# Patient Record
Sex: FEMALE | Race: WHITE | NOT HISPANIC OR LATINO | Employment: FULL TIME | URBAN - METROPOLITAN AREA
[De-identification: names, ages, dates, MRNs, and addresses within clinical notes are randomized per-mention and may not be internally consistent; named-entity substitution may affect disease eponyms.]

---

## 2017-11-21 ENCOUNTER — OFFICE VISIT (OUTPATIENT)
Dept: GASTROENTEROLOGY | Facility: CLINIC | Age: 32
End: 2017-11-21

## 2017-11-21 VITALS — BODY MASS INDEX: 20.92 KG/M2 | HEIGHT: 68 IN | WEIGHT: 138 LBS

## 2017-11-21 DIAGNOSIS — K76.6 PORTAL VENOUS HYPERTENSION (HCC): Primary | ICD-10-CM

## 2017-11-21 PROCEDURE — 99213 OFFICE O/P EST LOW 20 MIN: CPT | Performed by: INTERNAL MEDICINE

## 2017-11-21 RX ORDER — NADOLOL 20 MG/1
20 TABLET ORAL DAILY
Qty: 90 TABLET | Refills: 3 | Status: SHIPPED | OUTPATIENT
Start: 2017-11-21 | End: 2019-09-26 | Stop reason: SDUPTHER

## 2017-11-21 NOTE — PROGRESS NOTES
Chief Complaint   Patient presents with   • Follow-up     yearly/med refills       Chantelle Vásquez is a  32 y.o. female here for a follow up visit for Portal hypertension.    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  Patient had surgical correction now being followed for portal hypertension.  Patient on daily beta blocker but current medication reports having significant side effects.  Patient reports jitteriness and fatigue.  Patient denies nausea vomiting no melena or bright red blood per rectum denies hematemesis or change in diet.    Past Medical History:   Diagnosis Date   • Congenital biliary atresia    • Esophageal varices    • HTN (hypertension)    • Liver disease          Current Outpatient Prescriptions:   •  nadolol (CORGARD) 20 MG tablet, Take 1 tablet by mouth Daily., Disp: 90 tablet, Rfl: 3    Allergies   Allergen Reactions   • Aspirin      AVOIDS DUE TO LIVER HISTORY       Social History     Social History   • Marital status:      Spouse name: N/A   • Number of children: N/A   • Years of education: N/A     Occupational History   • Not on file.     Social History Main Topics   • Smoking status: Never Smoker   • Smokeless tobacco: Never Used   • Alcohol use No   • Drug use: No   • Sexual activity: Defer     Other Topics Concern   • Not on file     Social History Narrative       History reviewed. No pertinent family history.    Review of Systems   Constitutional: Positive for fatigue. Negative for activity change, appetite change, chills, diaphoresis, fever and unexpected weight change.   HENT: Negative.    Respiratory: Negative.    Cardiovascular: Negative.    Gastrointestinal: Negative.    Musculoskeletal: Negative.    Skin: Negative.    Hematological: Negative.        There were no vitals filed for this visit.    Physical Exam   Constitutional: She is oriented to person, place, and time. She appears well-developed and well-nourished.    HENT:   Head: Normocephalic and atraumatic.   Eyes: Pupils are equal, round, and reactive to light. No scleral icterus.   Abdominal: Soft. Bowel sounds are normal. She exhibits no distension and no mass. There is no tenderness. No hernia.   Neurological: She is alert and oriented to person, place, and time.   Skin: Skin is warm and dry. No rash noted.   Psychiatric: She has a normal mood and affect. Her behavior is normal.   Vitals reviewed.      No visits with results within 2 Month(s) from this visit.  Latest known visit with results is:    Admission on 03/18/2016, Discharged on 03/18/2016   Component Date Value Ref Range Status   • HCG, Urine, QL 03/18/2016 Negative  Negative In process       Chantelle was seen today for follow-up.    Diagnoses and all orders for this visit:    Portal venous hypertension  -     Case Request; Standing  -     Case Request    Other orders  -     Implement Anesthesia orders day of procedure.; Standing  -     Obtain informed consent; Standing  -     nadolol (CORGARD) 20 MG tablet; Take 1 tablet by mouth Daily.      Patient 32-year-old female with history of congenital malformation causing portal hypertension.  Patient status post surgical correction with Kasai procedure doing well without complaints.  Patient last endoscopy March 2016 was otherwise unremarkable.  Patient does report some issues with her current beta blocker causing jitteriness and fatigue.  At this point will repeat EGD to evaluate for varices and change her beta blocker to nadolol and follow for symptom response.

## 2018-01-19 ENCOUNTER — ANESTHESIA EVENT (OUTPATIENT)
Dept: GASTROENTEROLOGY | Facility: HOSPITAL | Age: 33
End: 2018-01-19

## 2018-01-19 ENCOUNTER — HOSPITAL ENCOUNTER (OUTPATIENT)
Facility: HOSPITAL | Age: 33
Setting detail: HOSPITAL OUTPATIENT SURGERY
Discharge: HOME | End: 2018-01-19
Attending: INTERNAL MEDICINE | Admitting: INTERNAL MEDICINE

## 2018-01-19 ENCOUNTER — ANESTHESIA (OUTPATIENT)
Dept: GASTROENTEROLOGY | Facility: HOSPITAL | Age: 33
End: 2018-01-19

## 2018-01-19 VITALS
RESPIRATION RATE: 16 BRPM | HEIGHT: 68 IN | WEIGHT: 142.13 LBS | OXYGEN SATURATION: 100 % | DIASTOLIC BLOOD PRESSURE: 63 MMHG | TEMPERATURE: 98.6 F | HEART RATE: 80 BPM | BODY MASS INDEX: 21.54 KG/M2 | SYSTOLIC BLOOD PRESSURE: 97 MMHG

## 2018-01-19 DIAGNOSIS — K76.6 PORTAL VENOUS HYPERTENSION (HCC): ICD-10-CM

## 2018-01-19 LAB
B-HCG UR QL: NEGATIVE
INTERNAL NEGATIVE CONTROL: NEGATIVE
INTERNAL POSITIVE CONTROL: POSITIVE
Lab: NORMAL

## 2018-01-19 PROCEDURE — S0260 H&P FOR SURGERY: HCPCS | Performed by: INTERNAL MEDICINE

## 2018-01-19 PROCEDURE — 88312 SPECIAL STAINS GROUP 1: CPT | Performed by: INTERNAL MEDICINE

## 2018-01-19 PROCEDURE — 43239 EGD BIOPSY SINGLE/MULTIPLE: CPT | Performed by: INTERNAL MEDICINE

## 2018-01-19 PROCEDURE — 25010000002 PROPOFOL 10 MG/ML EMULSION: Performed by: NURSE ANESTHETIST, CERTIFIED REGISTERED

## 2018-01-19 PROCEDURE — 88305 TISSUE EXAM BY PATHOLOGIST: CPT | Performed by: INTERNAL MEDICINE

## 2018-01-19 RX ORDER — SODIUM CHLORIDE, SODIUM LACTATE, POTASSIUM CHLORIDE, CALCIUM CHLORIDE 600; 310; 30; 20 MG/100ML; MG/100ML; MG/100ML; MG/100ML
1000 INJECTION, SOLUTION INTRAVENOUS CONTINUOUS PRN
Status: DISCONTINUED | OUTPATIENT
Start: 2018-01-19 | End: 2018-01-19 | Stop reason: HOSPADM

## 2018-01-19 RX ORDER — GLYCOPYRROLATE 0.2 MG/ML
INJECTION INTRAMUSCULAR; INTRAVENOUS AS NEEDED
Status: DISCONTINUED | OUTPATIENT
Start: 2018-01-19 | End: 2018-01-19 | Stop reason: SURG

## 2018-01-19 RX ORDER — PROPOFOL 10 MG/ML
VIAL (ML) INTRAVENOUS CONTINUOUS PRN
Status: DISCONTINUED | OUTPATIENT
Start: 2018-01-19 | End: 2018-01-19 | Stop reason: SURG

## 2018-01-19 RX ORDER — LIDOCAINE HYDROCHLORIDE 10 MG/ML
0.5 INJECTION, SOLUTION INFILTRATION; PERINEURAL ONCE AS NEEDED
Status: DISCONTINUED | OUTPATIENT
Start: 2018-01-19 | End: 2018-01-19 | Stop reason: HOSPADM

## 2018-01-19 RX ORDER — PROPOFOL 10 MG/ML
VIAL (ML) INTRAVENOUS AS NEEDED
Status: DISCONTINUED | OUTPATIENT
Start: 2018-01-19 | End: 2018-01-19 | Stop reason: SURG

## 2018-01-19 RX ORDER — SODIUM CHLORIDE 0.9 % (FLUSH) 0.9 %
3 SYRINGE (ML) INJECTION AS NEEDED
Status: DISCONTINUED | OUTPATIENT
Start: 2018-01-19 | End: 2018-01-19 | Stop reason: HOSPADM

## 2018-01-19 RX ADMIN — SODIUM CHLORIDE, POTASSIUM CHLORIDE, SODIUM LACTATE AND CALCIUM CHLORIDE 1000 ML: 600; 310; 30; 20 INJECTION, SOLUTION INTRAVENOUS at 07:37

## 2018-01-19 RX ADMIN — PROPOFOL 150 MCG/KG/MIN: 10 INJECTION, EMULSION INTRAVENOUS at 08:12

## 2018-01-19 RX ADMIN — PROPOFOL 60 MG: 10 INJECTION, EMULSION INTRAVENOUS at 08:11

## 2018-01-19 RX ADMIN — GLYCOPYRROLATE 0.2 MCG: 0.2 INJECTION INTRAMUSCULAR; INTRAVENOUS at 08:04

## 2018-01-19 NOTE — ANESTHESIA POSTPROCEDURE EVALUATION
"Patient: Chantelle Vásquez    Procedure Summary     Date Anesthesia Start Anesthesia Stop Room / Location    01/19/18 0802 0825  [facility]       Procedure Diagnosis Surgeon Provider    ESOPHAGOGASTRODUODENOSCOPY WITH BIOPSIES (N/A Esophagus) Portal venous hypertension; Gastritis; Hiatal hernia; Irregular Z line of esophagus  (Portal venous hypertension [K76.6]) MD Viridiana Rivera MD          Anesthesia Type: No value filed.  Last vitals  BP   102/60 (01/19/18 0825)   Temp   36.8 °C (98.2 °F) (01/19/18 0729)   Pulse   82 (01/19/18 0825)   Resp   16 (01/19/18 0825)     SpO2   99 % (01/19/18 0825)     Post Anesthesia Care and Evaluation    Patient location during evaluation: bedside  Patient participation: complete - patient participated  Level of consciousness: awake and alert  Pain management: adequate  Airway patency: patent  Anesthetic complications: No anesthetic complications  PONV Status: none  Cardiovascular status: acceptable  Respiratory status: acceptable  Hydration status: acceptable    Comments: /60 (BP Location: Left arm, Patient Position: Lying)  Pulse 82  Temp 36.8 °C (98.2 °F) (Oral)   Resp 16  Ht 172.7 cm (68\")  Wt 64.5 kg (142 lb 2 oz)  LMP 01/01/2018  SpO2 99%  BMI 21.61 kg/m2        "

## 2018-01-19 NOTE — H&P
"[facility]  Pre Procedure History & Physical    Chief Complaint:   Portal hypertension    Subjective     HPI:   Patient 32-year-old female with history congenital biliary atresia status post Kasai procedure. Due to chronic portal hypertension patient on monitoring regimen for varices.  Patient here for EGD    Past Medical History:   Past Medical History:   Diagnosis Date   • Congenital biliary atresia    • Esophageal varices    • HTN (hypertension)    • Liver disease    • Portal hypertension        Past Surgical History:  Past Surgical History:   Procedure Laterality Date   •  SECTION N/A    • ENDOSCOPY N/A 3/18/2016    Procedure: ESOPHAGOGASTRODUODENOSCOPY;  Surgeon: Cesar Castellanos MD;  Location: [facility] ENDOSCOPY;  Service:    • PORTOENTEROSTOMY KASAI PROCEDURE     • TUBAL ABDOMINAL LIGATION N/A        Family History:  Family History   Problem Relation Age of Onset   • Heart disease Mother        Social History:   reports that she has never smoked. She has never used smokeless tobacco. She reports that she does not drink alcohol or use illicit drugs.    Medications:   Prescriptions Prior to Admission   Medication Sig Dispense Refill Last Dose   • nadolol (CORGARD) 20 MG tablet Take 1 tablet by mouth Daily. 90 tablet 3 2018       Allergies:  Aspirin    ROS:    Pertinent items are noted in HPI     Objective     Blood pressure 118/80, pulse 80, temperature 98.2 °F (36.8 °C), temperature source Oral, resp. rate 16, height 172.7 cm (68\"), weight 64.5 kg (142 lb 2 oz), last menstrual period 2018, SpO2 99 %, currently breastfeeding.    Physical Exam   Constitutional: Pt is oriented to person, place, and time and well-developed, well-nourished, and in no distress.   Mouth/Throat: Oropharynx is clear and moist.   Neck: Normal range of motion.   Cardiovascular: Normal rate, regular rhythm and normal heart sounds.    Pulmonary/Chest: Effort normal and breath sounds normal. "   Abdominal: Soft. Nontender  Skin: Skin is warm and dry.   Psychiatric: Mood, memory, affect and judgment normal.     Assessment/Plan     Diagnosis:  Portal hypertension    Anticipated Surgical Procedure:  EGD    The risks, benefits, and alternatives of this procedure have been discussed with the patient or the responsible party- the patient understands and agrees to proceed.

## 2018-01-19 NOTE — DISCHARGE INSTRUCTIONS
For the next 24 hours patient needs to be with a responsible adult.    For 24 hours DO NOT drive, operate machinery, appliances, drink alcohol, make important decisions or sign legal documents.    Start with a light or bland diet and advance to regular diet as tolerated.    Follow recommendations on procedure report provided by your doctor.    Call Dr Castellanos for problems [phone]. Call for pathology results in 7-10 days.    Problems may include but not limited to: large amounts of bleeding, trouble breathing, repeated vomiting, severe unrelieved pain, fever or chills.

## 2018-01-19 NOTE — ANESTHESIA PREPROCEDURE EVALUATION
Anesthesia Evaluation     Patient summary reviewed   NPO Solid Status: > 8 hours  NPO Liquid Status: > 8 hours     Airway   Mallampati: II  TM distance: >3 FB  Dental      Pulmonary    Cardiovascular     Rhythm: regular  Rate: normal    (+) hypertension,       Neuro/Psych  GI/Hepatic/Renal/Endo    (+)  liver disease,     Musculoskeletal     Abdominal    Substance History      OB/GYN          Other                                                Anesthesia Plan    ASA 2       total IV anesthesia  Anesthetic plan and risks discussed with patient.    Plan discussed with CRNA.

## 2018-01-19 NOTE — PLAN OF CARE
Problem: Patient Care Overview (Adult)  Goal: Plan of Care Review  Outcome: Ongoing (interventions implemented as appropriate)   01/19/18 0712   Coping/Psychosocial Response Interventions   Plan Of Care Reviewed With patient   Patient Care Overview   Progress improving     Goal: Adult Individualization and Mutuality  Outcome: Ongoing (interventions implemented as appropriate)    Goal: Discharge Needs Assessment  Outcome: Ongoing (interventions implemented as appropriate)   01/19/18 0712   Discharge Needs Assessment   Concerns To Be Addressed no discharge needs identified   Discharge Disposition home or self-care   Living Environment   Transportation Available car;family or friend will provide       Problem: GI Endoscopy (Adult)  Intervention: Monitor/Manage Procedure Recovery   01/19/18 0712   Respiratory Interventions   Airway/Ventilation Management airway patency maintained   Coping/Psychosocial Interventions   Environmental Support calm environment promoted   Activity   Activity Type activity adjusted per tolerance   Cardiac Interventions   Warming Thermoregulation Maintenance warm blankets applied     Intervention: Prevent Erin-procedural Injury   01/19/18 0712   Positioning   Positioning side lying, left   Head Of Bed (HOB) Position HOB elevated       Goal: Signs and Symptoms of Listed Potential Problems Will be Absent or Manageable (GI Endoscopy)  Outcome: Ongoing (interventions implemented as appropriate)   01/19/18 0712   GI Endoscopy   Problems Assessed (GI Endoscopy) all   Problems Present (GI Endoscopy) none

## 2018-01-19 NOTE — BRIEF OP NOTE
ESOPHAGOGASTRODUODENOSCOPY  Progress Note    Chantelle Vásquez  1/19/2018    Pre-op Diagnosis:   Portal venous hypertension [K76.6]       Post-Op Diagnosis Codes:     * Portal venous hypertension [K76.6]     * Gastritis [K29.70]     * Hiatal hernia [K44.9]     * Irregular Z line of esophagus [K22.9]    Procedure/CPT® Codes:      Procedure(s):  ESOPHAGOGASTRODUODENOSCOPY WITH BIOPSIES    Surgeon(s):  Cesar Castellanos MD    Anesthesia: Monitor Anesthesia Care    Staff:   Endo Technician: Yashira Barrera  Endo Nurse: Meghan Gomez RN    Estimated Blood Loss: minimal    Urine Voided: * No values recorded between 1/19/2018  8:01 AM and 1/19/2018  8:18 AM *    Specimens:                  ID Type Source Tests Collected by Time Destination   A : ANTRAL BIOPSIES Tissue Gastric, Antrum TISSUE EXAM Cesar Castellanos MD 1/19/2018 0817          Drains:           Findings: Gastritis  Hiatal hernia  Irregular Z line  No varices seen    Complications: None      Cesar Castellanos MD     Date: 1/19/2018  Time: 8:19 AM

## 2018-01-22 ENCOUNTER — TELEPHONE (OUTPATIENT)
Dept: GASTROENTEROLOGY | Facility: CLINIC | Age: 33
End: 2018-01-22

## 2018-01-22 LAB
CYTO UR: NORMAL
LAB AP CASE REPORT: NORMAL
Lab: NORMAL
PATH REPORT.FINAL DX SPEC: NORMAL
PATH REPORT.GROSS SPEC: NORMAL

## 2018-01-22 NOTE — TELEPHONE ENCOUNTER
----- Message from Cesar Castellanos MD sent at 1/22/2018 12:36 PM EST -----  Just mild inflammation noted on biopsies continue her medication and follow-up as directed next year

## 2018-01-22 NOTE — TELEPHONE ENCOUNTER
Patient called, advised as per Dr. Castellanos's note. She verb understanding and is in agreeable to the plan.

## 2018-01-25 ENCOUNTER — DOCUMENTATION (OUTPATIENT)
Dept: GASTROENTEROLOGY | Facility: CLINIC | Age: 33
End: 2018-01-25

## 2019-09-26 ENCOUNTER — OFFICE VISIT (OUTPATIENT)
Dept: GASTROENTEROLOGY | Facility: CLINIC | Age: 34
End: 2019-09-26

## 2019-09-26 VITALS
BODY MASS INDEX: 23.01 KG/M2 | SYSTOLIC BLOOD PRESSURE: 108 MMHG | HEIGHT: 68 IN | WEIGHT: 151.8 LBS | DIASTOLIC BLOOD PRESSURE: 74 MMHG | TEMPERATURE: 98.2 F

## 2019-09-26 DIAGNOSIS — K76.6 PORTAL VENOUS HYPERTENSION (HCC): Primary | ICD-10-CM

## 2019-09-26 PROCEDURE — 99212 OFFICE O/P EST SF 10 MIN: CPT | Performed by: INTERNAL MEDICINE

## 2019-09-26 RX ORDER — NADOLOL 20 MG/1
20 TABLET ORAL DAILY
Qty: 90 TABLET | Refills: 3 | Status: SHIPPED | OUTPATIENT
Start: 2019-09-26 | End: 2020-11-24 | Stop reason: SDUPTHER

## 2019-09-26 NOTE — PROGRESS NOTES
Chief Complaint   Patient presents with   • Follow-up   • GI Problem     portal venous hypertension       Chantelle Vásquez is a  34 y.o. female here for a follow up visit for med refill.    HPI     Patient 34-year-old female with history of biliary atresia congenitally and esophageal varices here for follow-up.  Patient denies any GI complaints doing well.  Patient with no bleeding diathesis here for medication refill.    Past Medical History:   Diagnosis Date   • Congenital biliary atresia    • Esophageal varices (CMS/HCC)    • HTN (hypertension)    • Liver disease    • Portal hypertension (CMS/HCC)          Current Outpatient Medications:   •  nadolol (CORGARD) 20 MG tablet, Take 1 tablet by mouth Daily., Disp: 90 tablet, Rfl: 3    Allergies   Allergen Reactions   • Aspirin      AVOIDS DUE TO LIVER HISTORY       Social History     Socioeconomic History   • Marital status:      Spouse name: Not on file   • Number of children: Not on file   • Years of education: Not on file   • Highest education level: Not on file   Tobacco Use   • Smoking status: Never Smoker   • Smokeless tobacco: Never Used   Substance and Sexual Activity   • Alcohol use: No   • Drug use: No   • Sexual activity: Defer       Family History   Problem Relation Age of Onset   • Heart disease Mother        Review of Systems   Constitutional: Negative.    Respiratory: Negative.    Cardiovascular: Negative.    Gastrointestinal: Negative.    Musculoskeletal: Negative.    Skin: Negative.    Hematological: Negative.        Vitals:    09/26/19 1146   BP: 108/74   Temp: 98.2 °F (36.8 °C)       Physical Exam   Constitutional: She is oriented to person, place, and time. She appears well-developed and well-nourished.   HENT:   Head: Normocephalic and atraumatic.   Eyes: Pupils are equal, round, and reactive to light. No scleral icterus.   Abdominal: Soft. Bowel sounds are normal. She exhibits no distension and no mass. There is no tenderness. No hernia.    Neurological: She is alert and oriented to person, place, and time.   Skin: Skin is warm and dry.   Psychiatric: She has a normal mood and affect. Her behavior is normal.   Vitals reviewed.      No visits with results within 2 Month(s) from this visit.   Latest known visit with results is:   Admission on 01/19/2018, Discharged on 01/19/2018   Component Date Value Ref Range Status   • HCG, Urine, QL 01/19/2018 Negative  Negative Final   • Lot Number 01/19/2018 [id number]   Final   • Internal Positive Control 01/19/2018 Positive   Final   • Internal Negative Control 01/19/2018 Negative   Final   • Case Report 01/19/2018    Final                    Value:Surgical Pathology Report                         Case: [id number]                                  Authorizing Provider:  Cesar Castellanos MD     Collected:           01/19/2018 08:17 AM          Ordering Location:     [facility]  Received:            01/19/2018 11:08 AM                                 ENDO SUITES                                                                  Pathologist:           Pavan Lr MD                                                        Specimen:    Gastric, Antrum, ANTRAL BIOPSIES                                                          • Final Diagnosis 01/19/2018    Final                    Value:This result contains rich text formatting which cannot be displayed here.   • Gross Description 01/19/2018    Final                    Value:This result contains rich text formatting which cannot be displayed here.   • Microscopic Description 01/19/2018    Final                    Value:This result contains rich text formatting which cannot be displayed here.       Chantelle was seen today for follow-up and gi problem.    Diagnoses and all orders for this visit:    Portal venous hypertension (CMS/HCC)  -     Case Request; Standing  -     Implement Anesthesia orders day of procedure.; Standing  -     Obtain informed  consent; Standing  -     Case Request      Patient 34-year-old female with history of chronic portal hypertension due to congenital biliary atresia status post Kasai procedure doing well without complaints.  Patient here for med refill and to arrange follow-up EGD to monitor for varices.  We will proceed with refills and procedure and monitor clinically.

## 2020-11-24 ENCOUNTER — TELEPHONE (OUTPATIENT)
Dept: GASTROENTEROLOGY | Facility: CLINIC | Age: 35
End: 2020-11-24

## 2020-11-24 ENCOUNTER — OFFICE VISIT (OUTPATIENT)
Dept: GASTROENTEROLOGY | Facility: CLINIC | Age: 35
End: 2020-11-24

## 2020-11-24 VITALS — TEMPERATURE: 98.6 F | WEIGHT: 147.8 LBS | BODY MASS INDEX: 22.4 KG/M2 | HEIGHT: 68 IN

## 2020-11-24 DIAGNOSIS — K76.6 PORTAL VENOUS HYPERTENSION (HCC): Primary | ICD-10-CM

## 2020-11-24 PROCEDURE — 99212 OFFICE O/P EST SF 10 MIN: CPT | Performed by: INTERNAL MEDICINE

## 2020-11-24 RX ORDER — MULTIPLE VITAMINS W/ MINERALS TAB 9MG-400MCG
1 TAB ORAL DAILY
COMMUNITY

## 2020-11-24 RX ORDER — NADOLOL 20 MG/1
20 TABLET ORAL DAILY
Qty: 90 TABLET | Refills: 3 | Status: SHIPPED | OUTPATIENT
Start: 2020-11-24 | End: 2020-12-21 | Stop reason: HOSPADM

## 2020-11-24 NOTE — PROGRESS NOTES
Chief Complaint   Patient presents with   • Follow-up       Chantelle Vásquez is a  35 y.o. female here for a follow up visit for portal venous hypertension.    HPI     Patient 35-year-old female with history of congenital biliary atresia and portal hypertension here for follow-up.  Patient doing well with no complaints.  Last EGD January 2018 with no evidence of varices seen.  Patient due last year but has not followed up till now.  Patient denies hematemesis or melena no swelling or edema no ascites or distention.  Patient here for follow-up.    Past Medical History:   Diagnosis Date   • Congenital biliary atresia    • Esophageal varices (CMS/HCC)    • HTN (hypertension)    • Liver disease    • Portal hypertension (CMS/HCC)          Current Outpatient Medications:   •  multivitamin with minerals (MULTIVITAMIN WOMEN PO), Take 1 tablet by mouth Daily., Disp: , Rfl:   •  nadolol (CORGARD) 20 MG tablet, Take 1 tablet by mouth Daily., Disp: 90 tablet, Rfl: 3    Allergies   Allergen Reactions   • Aspirin      AVOIDS DUE TO LIVER HISTORY       Social History     Socioeconomic History   • Marital status:      Spouse name: Not on file   • Number of children: Not on file   • Years of education: Not on file   • Highest education level: Not on file   Tobacco Use   • Smoking status: Never Smoker   • Smokeless tobacco: Never Used   Substance and Sexual Activity   • Alcohol use: No   • Drug use: No   • Sexual activity: Defer       Family History   Problem Relation Age of Onset   • Heart disease Mother        Review of Systems   Constitutional: Negative.    Respiratory: Negative.    Cardiovascular: Negative.    Gastrointestinal: Negative.    Musculoskeletal: Negative.    Skin: Negative.    Hematological: Negative.        Vitals:    11/24/20 1345   Temp: 98.6 °F (37 °C)       Physical Exam  Vitals signs reviewed.   Constitutional:       Appearance: She is well-developed.   HENT:      Head: Normocephalic and atraumatic.    Eyes:      General: No scleral icterus.     Pupils: Pupils are equal, round, and reactive to light.   Cardiovascular:      Rate and Rhythm: Normal rate and regular rhythm.      Heart sounds: Normal heart sounds.   Pulmonary:      Effort: Pulmonary effort is normal.      Breath sounds: Normal breath sounds. No wheezing or rales.   Abdominal:      General: Bowel sounds are normal. There is no distension.      Palpations: Abdomen is soft. There is no mass.      Tenderness: There is no abdominal tenderness.      Hernia: No hernia is present.   Skin:     General: Skin is warm and dry.      Coloration: Skin is not jaundiced.   Neurological:      General: No focal deficit present.      Mental Status: She is alert and oriented to person, place, and time.   Psychiatric:         Behavior: Behavior normal.         Thought Content: Thought content normal.         No visits with results within 2 Month(s) from this visit.   Latest known visit with results is:   Admission on 01/19/2018, Discharged on 01/19/2018   Component Date Value Ref Range Status   • HCG, Urine, QL 01/19/2018 Negative  Negative Final   • Lot Number 01/19/2018 [id number]   Final   • Internal Positive Control 01/19/2018 Positive   Final   • Internal Negative Control 01/19/2018 Negative   Final   • Case Report 01/19/2018    Final                    Value:Surgical Pathology Report                         Case: [id number]                                  Authorizing Provider:  Cesar Castellanos MD     Collected:           01/19/2018 08:17 AM          Ordering Location[facility]     [facility]  Received:            01/19/2018 11:08 AM                                 ENDO SUITES                                                                  Pathologist:           Pavan Lr MD                                                        Specimen:    Gastric, Antrum, ANTRAL BIOPSIES                                                          • Final  Diagnosis 01/19/2018    Final                    Value:This result contains rich text formatting which cannot be displayed here.   • Gross Description 01/19/2018    Final                    Value:This result contains rich text formatting which cannot be displayed here.   • Microscopic Description 01/19/2018    Final                    Value:This result contains rich text formatting which cannot be displayed here.       Diagnoses and all orders for this visit:    1. Portal venous hypertension (CMS/HCC) (Primary)  -     Case Request; Standing  -     Implement Anesthesia orders day of procedure.; Standing  -     Obtain informed consent; Standing  -     Case Request      Patient 35-year-old female with history of congenital portal venous hypertension here for follow-up.  Patient denies any GI complaints.  Patient reports no swelling or edema.  Patient denies any ascites or abdominal distention.  Patient basically healthy without complaints.  Patient due last year for EGD to follow-up for varices but did not.  We will arrange EGD but continue current therapy and follow-up clinically.

## 2020-11-25 LAB
ERYTHROCYTE [DISTWIDTH] IN BLOOD BY AUTOMATED COUNT: 12.7 % (ref 12.3–15.4)
HCT VFR BLD AUTO: 40.1 % (ref 34–46.6)
HGB BLD-MCNC: 13.3 G/DL (ref 12–15.9)
INR PPP: 1.11 (ref 0.9–1.1)
MCH RBC QN AUTO: 29.6 PG (ref 26.6–33)
MCHC RBC AUTO-ENTMCNC: 33.2 G/DL (ref 31.5–35.7)
MCV RBC AUTO: 89.3 FL (ref 79–97)
PLATELET # BLD AUTO: 85 10*3/MM3 (ref 140–450)
PROTHROMBIN TIME: 14.1 SECONDS (ref 11.7–14.2)
RBC # BLD AUTO: 4.49 10*6/MM3 (ref 3.77–5.28)
WBC # BLD AUTO: 4.63 10*3/MM3 (ref 3.4–10.8)

## 2020-12-08 ENCOUNTER — TELEPHONE (OUTPATIENT)
Dept: GASTROENTEROLOGY | Facility: CLINIC | Age: 35
End: 2020-12-08

## 2020-12-08 ENCOUNTER — TRANSCRIBE ORDERS (OUTPATIENT)
Dept: SLEEP MEDICINE | Facility: HOSPITAL | Age: 35
End: 2020-12-08

## 2020-12-08 DIAGNOSIS — Z01.818 OTHER SPECIFIED PRE-OPERATIVE EXAMINATION: Primary | ICD-10-CM

## 2020-12-08 NOTE — TELEPHONE ENCOUNTER
----- Message from Cesar Castellanos MD sent at 11/30/2020  1:18 PM EST -----  Labs remain stable, await EGD.

## 2020-12-18 ENCOUNTER — LAB (OUTPATIENT)
Dept: LAB | Facility: HOSPITAL | Age: 35
End: 2020-12-18

## 2020-12-18 DIAGNOSIS — Z01.818 OTHER SPECIFIED PRE-OPERATIVE EXAMINATION: ICD-10-CM

## 2020-12-18 PROCEDURE — U0004 COV-19 TEST NON-CDC HGH THRU: HCPCS

## 2020-12-18 PROCEDURE — C9803 HOPD COVID-19 SPEC COLLECT: HCPCS

## 2020-12-19 LAB — SARS-COV-2 ORF1AB RESP QL NAA+PROBE: NOT DETECTED

## 2020-12-21 ENCOUNTER — ANESTHESIA (OUTPATIENT)
Dept: GASTROENTEROLOGY | Facility: HOSPITAL | Age: 35
End: 2020-12-21

## 2020-12-21 ENCOUNTER — HOSPITAL ENCOUNTER (OUTPATIENT)
Facility: HOSPITAL | Age: 35
Setting detail: HOSPITAL OUTPATIENT SURGERY
Discharge: HOME | End: 2020-12-21
Attending: INTERNAL MEDICINE | Admitting: INTERNAL MEDICINE

## 2020-12-21 ENCOUNTER — ANESTHESIA EVENT (OUTPATIENT)
Dept: GASTROENTEROLOGY | Facility: HOSPITAL | Age: 35
End: 2020-12-21

## 2020-12-21 VITALS
WEIGHT: 147.5 LBS | RESPIRATION RATE: 16 BRPM | HEART RATE: 78 BPM | DIASTOLIC BLOOD PRESSURE: 67 MMHG | OXYGEN SATURATION: 100 % | HEIGHT: 68 IN | BODY MASS INDEX: 22.35 KG/M2 | SYSTOLIC BLOOD PRESSURE: 102 MMHG | TEMPERATURE: 98.4 F

## 2020-12-21 DIAGNOSIS — K76.6 PORTAL VENOUS HYPERTENSION (HCC): ICD-10-CM

## 2020-12-21 PROCEDURE — 43239 EGD BIOPSY SINGLE/MULTIPLE: CPT | Performed by: INTERNAL MEDICINE

## 2020-12-21 PROCEDURE — 25010000002 PROPOFOL 10 MG/ML EMULSION: Performed by: NURSE ANESTHETIST, CERTIFIED REGISTERED

## 2020-12-21 PROCEDURE — 81025 URINE PREGNANCY TEST: CPT | Performed by: INTERNAL MEDICINE

## 2020-12-21 PROCEDURE — 88305 TISSUE EXAM BY PATHOLOGIST: CPT | Performed by: INTERNAL MEDICINE

## 2020-12-21 RX ORDER — GLYCOPYRROLATE 0.2 MG/ML
INJECTION INTRAMUSCULAR; INTRAVENOUS AS NEEDED
Status: DISCONTINUED | OUTPATIENT
Start: 2020-12-21 | End: 2020-12-21 | Stop reason: SURG

## 2020-12-21 RX ORDER — SODIUM CHLORIDE, SODIUM LACTATE, POTASSIUM CHLORIDE, CALCIUM CHLORIDE 600; 310; 30; 20 MG/100ML; MG/100ML; MG/100ML; MG/100ML
1000 INJECTION, SOLUTION INTRAVENOUS CONTINUOUS
Status: DISCONTINUED | OUTPATIENT
Start: 2020-12-21 | End: 2020-12-21 | Stop reason: HOSPADM

## 2020-12-21 RX ORDER — PROPRANOLOL HYDROCHLORIDE 10 MG/1
10 TABLET ORAL 3 TIMES DAILY
Qty: 270 TABLET | Refills: 3 | Status: SHIPPED | OUTPATIENT
Start: 2020-12-21 | End: 2022-11-23 | Stop reason: SDUPTHER

## 2020-12-21 RX ORDER — PROPOFOL 10 MG/ML
VIAL (ML) INTRAVENOUS AS NEEDED
Status: DISCONTINUED | OUTPATIENT
Start: 2020-12-21 | End: 2020-12-21 | Stop reason: SURG

## 2020-12-21 RX ORDER — PROPOFOL 10 MG/ML
VIAL (ML) INTRAVENOUS CONTINUOUS PRN
Status: DISCONTINUED | OUTPATIENT
Start: 2020-12-21 | End: 2020-12-21 | Stop reason: SURG

## 2020-12-21 RX ORDER — LIDOCAINE HYDROCHLORIDE 20 MG/ML
INJECTION, SOLUTION INFILTRATION; PERINEURAL AS NEEDED
Status: DISCONTINUED | OUTPATIENT
Start: 2020-12-21 | End: 2020-12-21 | Stop reason: SURG

## 2020-12-21 RX ADMIN — GLYCOPYRROLATE 0.2 MG: 0.2 INJECTION INTRAMUSCULAR; INTRAVENOUS at 12:51

## 2020-12-21 RX ADMIN — SODIUM CHLORIDE, POTASSIUM CHLORIDE, SODIUM LACTATE AND CALCIUM CHLORIDE 1000 ML: 600; 310; 30; 20 INJECTION, SOLUTION INTRAVENOUS at 12:48

## 2020-12-21 RX ADMIN — LIDOCAINE HYDROCHLORIDE 30 MG: 20 INJECTION, SOLUTION INFILTRATION; PERINEURAL at 12:53

## 2020-12-21 RX ADMIN — PROPOFOL 250 MCG/KG/MIN: 10 INJECTION, EMULSION INTRAVENOUS at 12:54

## 2020-12-21 RX ADMIN — PROPOFOL 100 MG: 10 INJECTION, EMULSION INTRAVENOUS at 12:54

## 2020-12-21 NOTE — ANESTHESIA PREPROCEDURE EVALUATION
Anesthesia Evaluation     Patient summary reviewed and Nursing notes reviewed                Airway   Mallampati: II  TM distance: >3 FB  Neck ROM: full  No difficulty expected  Dental - normal exam     Pulmonary - normal exam   Cardiovascular - normal exam    (+) hypertension less than 2 medications,       Neuro/Psych  GI/Hepatic/Renal/Endo    (+)   liver disease,     ROS Comment: Congenital biliary atresia with portal hypertension and varices/hx of portoenterostomy Kasai procedure    Musculoskeletal     Abdominal  - normal exam   Substance History      OB/GYN          Other                      Anesthesia Plan    ASA 3     MAC     intravenous induction     Anesthetic plan, all risks, benefits, and alternatives have been provided, discussed and informed consent has been obtained with: patient.    Plan discussed with CRNA.

## 2020-12-21 NOTE — ANESTHESIA POSTPROCEDURE EVALUATION
Patient: Chantelle Vásquez    Procedure Summary     Date: 12/21/20 Room / Location:  [facility]    Anesthesia Start: 1250 Anesthesia Stop: 1311    Procedure: ESOPHAGOGASTRODUODENOSCOPY WITH BIOPSIES (N/A Esophagus) Diagnosis:       Portal venous hypertension (CMS/HCC)      Gastric polyps      (Portal venous hypertension (CMS/HCC) [K76.6])    Surgeon: Cesar Castellanos MD Provider: Juan Carlos Matute MD    Anesthesia Type: MAC ASA Status: 3          Anesthesia Type: MAC    Vitals  Vitals Value Taken Time   /61 12/21/20 1309   Temp     Pulse 85 12/21/20 1309   Resp 16 12/21/20 1309   SpO2 99 % 12/21/20 1309           Post Anesthesia Care and Evaluation    Patient location during evaluation: PHASE II  Patient participation: complete - patient participated  Level of consciousness: awake and alert  Pain management: adequate  Airway patency: patent  Anesthetic complications: No anesthetic complications  PONV Status: none  Cardiovascular status: acceptable  Respiratory status: acceptable  Hydration status: acceptable

## 2020-12-22 LAB
CYTO UR: NORMAL
LAB AP CASE REPORT: NORMAL
PATH REPORT.FINAL DX SPEC: NORMAL
PATH REPORT.GROSS SPEC: NORMAL

## 2021-02-24 ENCOUNTER — TELEPHONE (OUTPATIENT)
Dept: GASTROENTEROLOGY | Facility: CLINIC | Age: 36
End: 2021-02-24

## 2021-04-16 ENCOUNTER — BULK ORDERING (OUTPATIENT)
Dept: CASE MANAGEMENT | Facility: OTHER | Age: 36
End: 2021-04-16

## 2021-04-16 DIAGNOSIS — Z23 IMMUNIZATION DUE: ICD-10-CM

## 2021-05-04 ENCOUNTER — IMMUNIZATION (OUTPATIENT)
Dept: VACCINE CLINIC | Facility: HOSPITAL | Age: 36
End: 2021-05-04

## 2021-05-04 DIAGNOSIS — Z23 IMMUNIZATION DUE: ICD-10-CM

## 2021-05-04 PROCEDURE — 0001A: CPT | Performed by: INTERNAL MEDICINE

## 2021-05-04 PROCEDURE — 91300 HC SARSCOV02 VAC 30MCG/0.3ML IM: CPT | Performed by: INTERNAL MEDICINE

## 2021-05-25 ENCOUNTER — IMMUNIZATION (OUTPATIENT)
Dept: VACCINE CLINIC | Facility: HOSPITAL | Age: 36
End: 2021-05-25

## 2021-05-25 PROCEDURE — 91300 HC SARSCOV02 VAC 30MCG/0.3ML IM: CPT | Performed by: INTERNAL MEDICINE

## 2021-05-25 PROCEDURE — 0002A: CPT | Performed by: INTERNAL MEDICINE

## 2021-10-27 ENCOUNTER — TELEPHONE (OUTPATIENT)
Dept: GASTROENTEROLOGY | Facility: CLINIC | Age: 36
End: 2021-10-27

## 2021-10-27 NOTE — TELEPHONE ENCOUNTER
Emanuel patient:      Patient with a history of portal venous hypertension had an EGD with Dr. Castellanos in December of last year with normal esophagus, gastric polyp, normal duodenum.  His recommendations were to give a beta-blocker.  Looks like she was on Corgard at one point 2019 but then moved to propanolol after this EGD.  She is calling and asking for something that could be dosed once a day.  Could we put her back on Corgard?

## 2021-10-27 NOTE — TELEPHONE ENCOUNTER
----- Message from Matt Low sent at 10/27/2021 11:12 AM EDT -----  Regarding: MEDS  Contact: [phone]  Pt is wanting to know if there is something she can take once a day.

## 2021-10-27 NOTE — TELEPHONE ENCOUNTER
Called pt and pt states that she would like to take a medication that does not have to be taken three times a day like propranolol.  Pt asking if there is a different medication that would also work and only have to be taken once a day.  Advised would send message to Dr. Castellanos's NP, Cristal.

## 2021-10-28 RX ORDER — NADOLOL 20 MG/1
20 TABLET ORAL DAILY
Qty: 90 TABLET | Refills: 3 | Status: SHIPPED | OUTPATIENT
Start: 2021-10-28 | End: 2022-12-21 | Stop reason: SDUPTHER

## 2021-10-28 NOTE — TELEPHONE ENCOUNTER
Per ARIEL Bee: Please inform the patient that we can transition her from propanolol to Corgard instead.  Please E scribe Corgard 20 mg once daily and stop propranolol.

## 2021-10-28 NOTE — TELEPHONE ENCOUNTER
Patient called. Advised as per Crsital's note. She verb understanding. Medication e-scribed to patient's pharmacy.

## 2021-10-28 NOTE — TELEPHONE ENCOUNTER
Please inform the patient that we can transition her from propanolol to Corgard instead.  Please E scribe Corgard 20 mg once daily and stop propranolol.

## 2022-11-23 NOTE — TELEPHONE ENCOUNTER
Caller: Chantelle Vásquez    Relationship: Self    Best call back number: [phone]    Requested Prescriptions:   Requested Prescriptions     Pending Prescriptions Disp Refills   • propranolol (INDERAL) 10 MG tablet 270 tablet 3     Sig: Take 1 tablet by mouth 3 (Three) Times a Day.        Pharmacy where request should be sent:  [facility]   [phone]  Additional details provided by patient:     Does the patient have less than a 3 day supply:  [] Yes  [x] No    Matt Dominguez Rep   11/23/22 12:26 EST

## 2022-11-27 RX ORDER — PROPRANOLOL HYDROCHLORIDE 10 MG/1
10 TABLET ORAL 3 TIMES DAILY
Qty: 270 TABLET | Refills: 3 | Status: SHIPPED | OUTPATIENT
Start: 2022-11-27 | End: 2023-01-19

## 2022-12-21 ENCOUNTER — TELEPHONE (OUTPATIENT)
Dept: GASTROENTEROLOGY | Facility: CLINIC | Age: 37
End: 2022-12-21

## 2022-12-21 RX ORDER — NADOLOL 20 MG/1
20 TABLET ORAL DAILY
Qty: 30 TABLET | Refills: 1 | Status: SHIPPED | OUTPATIENT
Start: 2022-12-21 | End: 2023-01-19

## 2022-12-21 NOTE — TELEPHONE ENCOUNTER
Caller: Chantelle Vásquez    Relationship: Self    Best call back number: [phone]    Requested Prescriptions: nadolol (CORGARD) 20 MG tablet       Requested Prescriptions      No prescriptions requested or ordered in this encounter        Pharmacy where request should be sent:    [facility] #[id number] - [place], [place] - [address] [address] AT [address]CTION OF [address] [address] - [phone]  - [phone]    [address] [address]   Phone: [phone] Fax: [phone]         Additional details provided by patient: PT ONLY HAS 1 PILL LEFT.     Does the patient have less than a 3 day supply:  [x] Yes  [] No    Would you like a call back once the refill request has been completed: [x] Yes [] No    If the office needs to give you a call back, can they leave a voicemail: [x] Yes [] No    Matt Hernandez Rep   12/21/22 11:31 EST

## 2022-12-21 NOTE — TELEPHONE ENCOUNTER
Returned patient's phone call. She states she needs a Nadolol refill. Has f/u appointment scheduled for 01/19 with Dr. Castellanos.     Script written, sent to MD for signature.

## 2022-12-23 NOTE — TELEPHONE ENCOUNTER
Caller: Chantelle Vásquez    Relationship: Self    Best call back number: [phone]    Requested Prescriptions:   Requested Prescriptions     Pending Prescriptions Disp Refills   • nadolol (CORGARD) 20 MG tablet 30 tablet 1     Sig: Take 1 tablet by mouth Daily.        [facility] be sent[facility] [address] [facility] [id number] [phone]    Additional details provided by patient: PT ERMA HAD MEDICATION FOR 5 DAYS BECAUSE OLD PHARMACY WAS CLOSED SO SHE SWITCHED HER PHARMACY TO [facility].  Does the patient have less than a 3 day supply:  [x] Yes  [] No    Would you like a call back once the refill request has been completed: [x] Yes [] No    If the office needs to give you a call back, can they leave a voicemail: [x] Yes [] No    Matt Dominguez Rep   12/23/22 15:03 EST

## 2022-12-27 RX ORDER — NADOLOL 20 MG/1
20 TABLET ORAL DAILY
Qty: 30 TABLET | Refills: 1 | Status: CANCELLED | OUTPATIENT
Start: 2022-12-27

## 2023-01-17 RX ORDER — NADOLOL 20 MG/1
TABLET ORAL
Qty: 30 TABLET | Refills: 1 | OUTPATIENT
Start: 2023-01-17

## 2023-01-19 ENCOUNTER — OFFICE VISIT (OUTPATIENT)
Dept: GASTROENTEROLOGY | Facility: CLINIC | Age: 38
End: 2023-01-19

## 2023-01-19 VITALS
OXYGEN SATURATION: 97 % | WEIGHT: 149 LBS | TEMPERATURE: 96.7 F | HEART RATE: 68 BPM | SYSTOLIC BLOOD PRESSURE: 103 MMHG | BODY MASS INDEX: 22.58 KG/M2 | HEIGHT: 68 IN | DIASTOLIC BLOOD PRESSURE: 63 MMHG

## 2023-01-19 DIAGNOSIS — K76.6 PORTAL VENOUS HYPERTENSION: Primary | ICD-10-CM

## 2023-01-19 PROCEDURE — 99213 OFFICE O/P EST LOW 20 MIN: CPT | Performed by: INTERNAL MEDICINE

## 2023-01-19 RX ORDER — NADOLOL 20 MG/1
20 TABLET ORAL DAILY
Qty: 90 TABLET | Refills: 3 | Status: SHIPPED | OUTPATIENT
Start: 2023-01-19 | End: 2023-02-13

## 2023-02-11 NOTE — PROGRESS NOTES
Chief Complaint   Patient presents with   • Med Refill       Chantelle Vásquez is a  37 y.o. female here for a follow up visit for portal hypertensive gastropathy.    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  Pt asymptomatic doing well.  Pt denies gi complaints    Past Medical History:   Diagnosis Date   • Congenital biliary atresia    • History of esophageal varices 2016   • HTN (hypertension)    • Liver disease    • Portal hypertension (HCC)          Current Outpatient Medications:   •  multivitamin with minerals tablet tablet, Take 1 tablet by mouth Daily., Disp: , Rfl:   •  nadolol (CORGARD) 20 MG tablet, Take 1 tablet by mouth Daily., Disp: 90 tablet, Rfl: 3    Allergies   Allergen Reactions   • Aspirin Other (See Comments)     AVOIDS DUE TO LIVER HISTORY       Social History     Socioeconomic History   • Marital status:    Tobacco Use   • Smoking status: Never   • Smokeless tobacco: Never   Vaping Use   • Vaping Use: Never used   Substance and Sexual Activity   • Alcohol use: No   • Drug use: No   • Sexual activity: Defer       Family History   Problem Relation Age of Onset   • Heart disease Mother    • Malig Hyperthermia Neg Hx        Review of Systems   Constitutional: Negative.    HENT: Negative.    Respiratory: Negative.    Cardiovascular: Negative.    Gastrointestinal: Negative.    Endocrine: Negative.    Musculoskeletal: Negative.    Allergic/Immunologic: Negative.    Hematological: Negative.        Vitals:    01/19/23 1609   BP: 103/63   Pulse: 68   Temp: 96.7 °F (35.9 °C)   SpO2: 97%       Physical Exam  Vitals reviewed.   Constitutional:       Appearance: Normal appearance. She is well-developed and normal weight.   HENT:      Head: Normocephalic and atraumatic.   Eyes:      General: No scleral icterus.     Pupils: Pupils are equal, round, and reactive to light.   Cardiovascular:      Rate and Rhythm: Normal rate and regular rhythm.      Heart sounds: Normal heart  sounds.   Pulmonary:      Effort: Pulmonary effort is normal. No respiratory distress.      Breath sounds: Normal breath sounds.   Abdominal:      General: Bowel sounds are normal. There is no distension.      Palpations: Abdomen is soft. There is no mass.      Tenderness: There is no abdominal tenderness.      Hernia: No hernia is present.   Skin:     General: Skin is warm and dry.      Coloration: Skin is not jaundiced.   Neurological:      General: No focal deficit present.      Mental Status: She is alert and oriented to person, place, and time.      Cranial Nerves: No cranial nerve deficit.   Psychiatric:         Behavior: Behavior normal.         Thought Content: Thought content normal.         Judgment: Judgment normal.         No visits with results within 2 Month(s) from this visit.   Latest known visit with results is:   Admission on 12/21/2020, Discharged on 12/21/2020   Component Date Value Ref Range Status   • HCG, Urine, QL 12/21/2020 Negative  Negative Final   • Lot Number 12/21/2020 [id number]   Final   • Internal Positive Control 12/21/2020 Positive   Final   • Internal Negative Control 12/21/2020 Negative   Final   • Case Report 12/21/2020    Final                    Value:Surgical Pathology Report                         Case: [id number]                                  Authorizing Provider:  Cesar Castellanos MD    Collected:           12/21/2020 01:02 PM          Ordering Location:     [facility]  Received:            12/21/2020 02:20 PM                                 ENDO SUITES                                                                  Pathologist:           Fly Andersen MD                                                         Specimen:    Gastric, Body, GASTRIC POLYP                                                              • Final Diagnosis 12/21/2020    Final                    Value:This result contains rich text formatting which cannot be displayed here.    • Gross Description 12/21/2020    Final                    Value:This result contains rich text formatting which cannot be displayed here.   • Microscopic Description 12/21/2020    Final                    Value:This result contains rich text formatting which cannot be displayed here.       Diagnoses and all orders for this visit:    1. Portal venous hypertension (HCC) (Primary)  -     Case Request; Standing  -     Implement Anesthesia orders day of procedure.; Standing  -     Obtain informed consent; Standing  -     Case Request    Other orders  -     nadolol (CORGARD) 20 MG tablet; Take 1 tablet by mouth Daily.  Dispense: 90 tablet; Refill: 3      Pt 36 yo F with h/o congenital biliary atresia and portal hypertensive gastropathy here for med refill. Pt is due for EGD surveillance.  Will refill corgard and schedule scope

## 2023-02-13 RX ORDER — NADOLOL 20 MG/1
TABLET ORAL
Qty: 30 TABLET | Refills: 1 | Status: SHIPPED | OUTPATIENT
Start: 2023-02-13 | End: 2023-03-13

## 2023-03-13 RX ORDER — NADOLOL 20 MG/1
TABLET ORAL
Qty: 90 TABLET | Refills: 1 | Status: SHIPPED | OUTPATIENT
Start: 2023-03-13

## 2023-03-29 ENCOUNTER — TELEPHONE (OUTPATIENT)
Dept: GASTROENTEROLOGY | Facility: CLINIC | Age: 38
End: 2023-03-29

## 2023-09-22 RX ORDER — NADOLOL 20 MG/1
TABLET ORAL
Qty: 90 TABLET | Refills: 1 | Status: SHIPPED | OUTPATIENT
Start: 2023-09-22

## 2024-02-03 ENCOUNTER — HOSPITAL ENCOUNTER (EMERGENCY)
Facility: HOSPITAL | Age: 39
Discharge: HOME | End: 2024-02-03
Attending: EMERGENCY MEDICINE
Payer: MEDICAID

## 2024-02-03 VITALS
TEMPERATURE: 98.3 F | HEART RATE: 78 BPM | HEIGHT: 68 IN | DIASTOLIC BLOOD PRESSURE: 70 MMHG | OXYGEN SATURATION: 99 % | RESPIRATION RATE: 15 BRPM | BODY MASS INDEX: 21.98 KG/M2 | WEIGHT: 145 LBS | SYSTOLIC BLOOD PRESSURE: 105 MMHG

## 2024-02-03 DIAGNOSIS — R10.13 EPIGASTRIC ABDOMINAL PAIN: Primary | ICD-10-CM

## 2024-02-03 LAB
ALBUMIN SERPL-MCNC: 4.4 G/DL (ref 3.5–5.2)
ALBUMIN/GLOB SERPL: 2.2 G/DL
ALP SERPL-CCNC: 70 U/L (ref 39–117)
ALT SERPL W P-5'-P-CCNC: 35 U/L (ref 1–33)
ANION GAP SERPL CALCULATED.3IONS-SCNC: 8.6 MMOL/L (ref 5–15)
AST SERPL-CCNC: 29 U/L (ref 1–32)
BASOPHILS # BLD AUTO: 0.02 10*3/MM3 (ref 0–0.2)
BASOPHILS NFR BLD AUTO: 0.5 % (ref 0–1.5)
BILIRUB SERPL-MCNC: 0.4 MG/DL (ref 0–1.2)
BUN SERPL-MCNC: 15 MG/DL (ref 6–20)
BUN/CREAT SERPL: 23.4 (ref 7–25)
CALCIUM SPEC-SCNC: 9.2 MG/DL (ref 8.6–10.5)
CHLORIDE SERPL-SCNC: 105 MMOL/L (ref 98–107)
CO2 SERPL-SCNC: 26.4 MMOL/L (ref 22–29)
CREAT SERPL-MCNC: 0.64 MG/DL (ref 0.57–1)
DEPRECATED RDW RBC AUTO: 42 FL (ref 37–54)
EGFRCR SERPLBLD CKD-EPI 2021: 116.2 ML/MIN/1.73
EOSINOPHIL # BLD AUTO: 0.11 10*3/MM3 (ref 0–0.4)
EOSINOPHIL NFR BLD AUTO: 2.6 % (ref 0.3–6.2)
ERYTHROCYTE [DISTWIDTH] IN BLOOD BY AUTOMATED COUNT: 12.7 % (ref 12.3–15.4)
GLOBULIN UR ELPH-MCNC: 2 GM/DL
GLUCOSE SERPL-MCNC: 112 MG/DL (ref 65–99)
HCG SERPL QL: NEGATIVE
HCT VFR BLD AUTO: 41.5 % (ref 34–46.6)
HGB BLD-MCNC: 13.7 G/DL (ref 12–15.9)
IMM GRANULOCYTES # BLD AUTO: 0.01 10*3/MM3 (ref 0–0.05)
IMM GRANULOCYTES NFR BLD AUTO: 0.2 % (ref 0–0.5)
LIPASE SERPL-CCNC: 49 U/L (ref 13–60)
LYMPHOCYTES # BLD AUTO: 1.36 10*3/MM3 (ref 0.7–3.1)
LYMPHOCYTES NFR BLD AUTO: 31.8 % (ref 19.6–45.3)
MAGNESIUM SERPL-MCNC: 2 MG/DL (ref 1.6–2.6)
MCH RBC QN AUTO: 29.7 PG (ref 26.6–33)
MCHC RBC AUTO-ENTMCNC: 33 G/DL (ref 31.5–35.7)
MCV RBC AUTO: 89.8 FL (ref 79–97)
MONOCYTES # BLD AUTO: 0.2 10*3/MM3 (ref 0.1–0.9)
MONOCYTES NFR BLD AUTO: 4.7 % (ref 5–12)
NEUTROPHILS NFR BLD AUTO: 2.58 10*3/MM3 (ref 1.7–7)
NEUTROPHILS NFR BLD AUTO: 60.2 % (ref 42.7–76)
PLATELET # BLD AUTO: 114 10*3/MM3 (ref 140–450)
PMV BLD AUTO: 10.3 FL (ref 6–12)
POTASSIUM SERPL-SCNC: 3.6 MMOL/L (ref 3.5–5.2)
PROT SERPL-MCNC: 6.4 G/DL (ref 6–8.5)
RBC # BLD AUTO: 4.62 10*6/MM3 (ref 3.77–5.28)
SODIUM SERPL-SCNC: 140 MMOL/L (ref 136–145)
WBC NRBC COR # BLD AUTO: 4.28 10*3/MM3 (ref 3.4–10.8)

## 2024-02-03 PROCEDURE — 99284 EMERGENCY DEPT VISIT MOD MDM: CPT | Performed by: EMERGENCY MEDICINE

## 2024-02-03 PROCEDURE — 25010000002 METOCLOPRAMIDE PER 10 MG: Performed by: EMERGENCY MEDICINE

## 2024-02-03 PROCEDURE — 96375 TX/PRO/DX INJ NEW DRUG ADDON: CPT

## 2024-02-03 PROCEDURE — 25010000002 MORPHINE PER 10 MG: Performed by: EMERGENCY MEDICINE

## 2024-02-03 PROCEDURE — 83690 ASSAY OF LIPASE: CPT | Performed by: EMERGENCY MEDICINE

## 2024-02-03 PROCEDURE — 83735 ASSAY OF MAGNESIUM: CPT | Performed by: EMERGENCY MEDICINE

## 2024-02-03 PROCEDURE — 93010 ELECTROCARDIOGRAM REPORT: CPT | Performed by: INTERNAL MEDICINE

## 2024-02-03 PROCEDURE — 93005 ELECTROCARDIOGRAM TRACING: CPT | Performed by: EMERGENCY MEDICINE

## 2024-02-03 PROCEDURE — 84703 CHORIONIC GONADOTROPIN ASSAY: CPT | Performed by: EMERGENCY MEDICINE

## 2024-02-03 PROCEDURE — 96361 HYDRATE IV INFUSION ADD-ON: CPT

## 2024-02-03 PROCEDURE — 80053 COMPREHEN METABOLIC PANEL: CPT | Performed by: EMERGENCY MEDICINE

## 2024-02-03 PROCEDURE — 96374 THER/PROPH/DIAG INJ IV PUSH: CPT

## 2024-02-03 PROCEDURE — 25010000002 ONDANSETRON PER 1 MG: Performed by: EMERGENCY MEDICINE

## 2024-02-03 PROCEDURE — 85025 COMPLETE CBC W/AUTO DIFF WBC: CPT | Performed by: EMERGENCY MEDICINE

## 2024-02-03 PROCEDURE — 25810000003 SODIUM CHLORIDE 0.9 % SOLUTION: Performed by: EMERGENCY MEDICINE

## 2024-02-03 PROCEDURE — 99283 EMERGENCY DEPT VISIT LOW MDM: CPT

## 2024-02-03 RX ORDER — SODIUM CHLORIDE 0.9 % (FLUSH) 0.9 %
10 SYRINGE (ML) INJECTION AS NEEDED
Status: DISCONTINUED | OUTPATIENT
Start: 2024-02-03 | End: 2024-02-03 | Stop reason: HOSPADM

## 2024-02-03 RX ORDER — FAMOTIDINE 20 MG/1
20 TABLET, FILM COATED ORAL 2 TIMES DAILY
Qty: 10 TABLET | Refills: 0 | Status: SHIPPED | OUTPATIENT
Start: 2024-02-03 | End: 2024-02-08

## 2024-02-03 RX ORDER — PANTOPRAZOLE SODIUM 40 MG/10ML
40 INJECTION, POWDER, LYOPHILIZED, FOR SOLUTION INTRAVENOUS ONCE
Status: COMPLETED | OUTPATIENT
Start: 2024-02-03 | End: 2024-02-03

## 2024-02-03 RX ORDER — FENTANYL CITRATE 50 UG/ML
25 INJECTION, SOLUTION INTRAMUSCULAR; INTRAVENOUS ONCE
Status: DISCONTINUED | OUTPATIENT
Start: 2024-02-03 | End: 2024-02-03

## 2024-02-03 RX ORDER — ONDANSETRON 2 MG/ML
4 INJECTION INTRAMUSCULAR; INTRAVENOUS ONCE
Status: COMPLETED | OUTPATIENT
Start: 2024-02-03 | End: 2024-02-03

## 2024-02-03 RX ORDER — MORPHINE SULFATE 4 MG/ML
4 INJECTION, SOLUTION INTRAMUSCULAR; INTRAVENOUS ONCE
Status: DISCONTINUED | OUTPATIENT
Start: 2024-02-03 | End: 2024-02-03 | Stop reason: HOSPADM

## 2024-02-03 RX ORDER — METOCLOPRAMIDE 10 MG/1
10 TABLET ORAL
Qty: 20 TABLET | Refills: 0 | Status: SHIPPED | OUTPATIENT
Start: 2024-02-03 | End: 2024-02-08

## 2024-02-03 RX ORDER — MORPHINE SULFATE 4 MG/ML
4 INJECTION, SOLUTION INTRAMUSCULAR; INTRAVENOUS ONCE
Status: COMPLETED | OUTPATIENT
Start: 2024-02-03 | End: 2024-02-03

## 2024-02-03 RX ORDER — METOCLOPRAMIDE HYDROCHLORIDE 5 MG/ML
10 INJECTION INTRAMUSCULAR; INTRAVENOUS ONCE
Status: COMPLETED | OUTPATIENT
Start: 2024-02-03 | End: 2024-02-03

## 2024-02-03 RX ORDER — OXYCODONE HYDROCHLORIDE AND ACETAMINOPHEN 5; 325 MG/1; MG/1
1 TABLET ORAL EVERY 6 HOURS PRN
Qty: 8 TABLET | Refills: 0 | Status: SHIPPED | OUTPATIENT
Start: 2024-02-03

## 2024-02-03 RX ADMIN — ONDANSETRON 4 MG: 2 INJECTION INTRAMUSCULAR; INTRAVENOUS at 06:48

## 2024-02-03 RX ADMIN — PANTOPRAZOLE SODIUM 40 MG: 40 INJECTION, POWDER, FOR SOLUTION INTRAVENOUS at 06:50

## 2024-02-03 RX ADMIN — METOCLOPRAMIDE 10 MG: 5 INJECTION, SOLUTION INTRAMUSCULAR; INTRAVENOUS at 07:36

## 2024-02-03 RX ADMIN — MORPHINE SULFATE 4 MG: 4 INJECTION, SOLUTION INTRAMUSCULAR; INTRAVENOUS at 06:48

## 2024-02-03 RX ADMIN — SODIUM CHLORIDE 1000 ML: 9 INJECTION, SOLUTION INTRAVENOUS at 06:47

## 2024-02-03 NOTE — DISCHARGE INSTRUCTIONS
Your blood work is normal today.  Your vital signs are good.   The Reglan worked better than the Zofran for nausea. We discussed that an endoscope is more likely to reveal information if it  is your stomach or esophagus that is causing the problem.  You could have had just 1 drop of acid from your stomach get past the valve of your esophagus into your esophagus and it does not belong in that tissue and will burn and cause pain.  If this is the cause of the pain then raise the head of the bed by putting paperback books under the supports and or wedge pillow so that you are slightly elevated with your chest above your abdomen to reduce any leaking.  This is not too uncommon for people as we age and also in the early morning of hours of sleeping.  We discussed the prescriptions for pain and nausea (Reglan) and Pepcid for stomach acid. You are welcome to return here if the pain returns and if there are extenuating circumstances such as, vomiting blood, that calling an ambulance is a better idea and/or going directly to the main [facility] ER although she is always welcome here anytime.

## 2024-02-03 NOTE — FSED PROVIDER NOTE
Subjective   History of Present Illness  Patient is a 38-year-old female.  She does have a history of congenital biliary atresia with portal hypertension and esophageal varices.  She presents after awakening at approximately 5:30 AM this morning with epigastric abdominal burning.  No significant radiation to her back.  She does report nausea with an episode of vomiting.  No upper or lower GI bleeding noted.  No shortness of breath.  No fever no chills.  No melanotic stools      Review of Systems  Constitutional: No fevers, chills, sweats unless otherwise documented in HPI  Eyes: No recent visual problems, eye discharge, eye pain, redness unless otherwise documented in HPI  HEENT: No ear pain, nasal congestion, sore throat, voice changes unless otherwise documented in HPI  Respiratory: No shortness of breath, cough, pain on breathing, sputum production unless otherwise documented in HPI  Cardiovascular: No chest pain, palpitations, syncope, orthopnea unless otherwise documented in HPI  Gastrointestinal: No nausea, vomiting, diarrhea, constipation unless otherwise documented in HPI  Genitourinary: No hematuria, dysuria, incontinence unless otherwise documented in HPI  Endocrine: Negative for excessive thirst, excessive hunger, excessive urination, heat or cold intolerance unless otherwise documented in HPI  Musculoskeletal: No back pain, neck pain, joint pain, muscle pain, decreased range of motion unless otherwise documented in HPI  Integumentary: No rash, pruritus, abrasion, lesions unless otherwise documented in HPI  Neurologic: No weakness, numbness, frequent headaches, tremors unless otherwise documented in HPI  Psychiatric: No anxiety, depression, mood changes, hallucinations unless otherwise documented in HPI        Past Medical History:   Diagnosis Date    Congenital biliary atresia     History of esophageal varices 2016    HTN (hypertension)     Liver disease     Portal hypertension        Allergies    Allergen Reactions    Aspirin Other (See Comments)     AVOIDS DUE TO LIVER HISTORY       Past Surgical History:   Procedure Laterality Date     SECTION N/A     CHOLECYSTECTOMY      ENDOSCOPY N/A 3/18/2016    normal    ENDOSCOPY N/A 2018    Z-line irregular, at the gastroesophageal junction, small hiatal hernia, erythematous mucosa in the antrum    ENDOSCOPY N/A 2020    Procedure: ESOPHAGOGASTRODUODENOSCOPY WITH BIOPSIES;  Surgeon: Cesar Castellanos MD;  Location: [facility] ENDOSCOPY;  Service: Gastroenterology;  Laterality: N/A;  PRE- HX PORTAL HYPERTENSION   POST- GASTRIC POLYP      PORTOENTEROSTOMY KASAI PROCEDURE      TUBAL ABDOMINAL LIGATION N/A        Family History   Problem Relation Age of Onset    Heart disease Mother     Malig Hyperthermia Neg Hx        Social History     Socioeconomic History    Marital status:    Tobacco Use    Smoking status: Never    Smokeless tobacco: Never   Vaping Use    Vaping Use: Never used   Substance and Sexual Activity    Alcohol use: No    Drug use: No    Sexual activity: Defer           Objective   Physical Exam  Vital signs: Reviewed in nurses notes    General: Awake alert.  She does appear to be uncomfortable but nontoxic    HEENT: Pupils equal round responsive to light.  Extra-ocular movements are intact.  No scleral icterus.  Nasopharynx is clear.  Oropharynx is clear with moist mucous membranes.  No masses noted    Neck:   Supple without lymphadenopathy    Respiratory:   Nonlabored respirations.  Clear to auscultation bilaterally.  Equal breath sounds bilaterally.  No wheezes or stridor noted.    Cardiovascular: Regular rate and rhythm.  No murmur.  Equal pulses in bilateral lower extremities without edema.    Abdomen: Soft nondistended bed sounds are present.  There is mild to moderate epigastric tenderness to deep palpation without rebound or guarding.    Skin:   Warm and dry.  No rashes noted    Neurological examination: Patient is awake  alert oriented x4.  Speech is normal.  No facial palsy.  No focal motor or sensory deficits.    ECG 12 Lead Chest Pain      Date/Time: 2/3/2024 6:41 AM    Performed by: Sigifredo Magdaleno MD  Authorized by: Sigifredo Magdaleno MD  Interpreted by ED physician  Rhythm: sinus rhythm  Comments: Normal sinus rhythm rate of 65.  No acute ST elevation or depression noted               ED Course  ED Course as of 02/04/24 0327   Sat Feb 03, 2024   0727 Exam and discussion with patient. Re reviewed PMH, banded esophageal varices, on BP meds, mild nausea and pain not better since treatment, we discussed that blood work unremarkable. Pain started at 0530 this am, not common to have this pain, no hematemesis or coughing up blood, no dark stools or bloody stools, no radiation of pain, no PMH PUD.  at this time. Will try reglan and second dose of morphine before pursuing fentanyl. [AR]   0747 Feeling better before the second morphine was given. [AR]   0840 Patient declined a second dose of pain medicine since she felt like her pain was better after I left. [AR]   0840 Patient is still pain-free since the first morphine as she declined a second and said she did not need it.  She said the Reglan worked better than the Zofran.  She is very comfortable now and feels she can go home although her  raised a few objections and we talked about studies.  We discussed that an endoscope is more likely to reveal information if it truly is her stomach or esophagus that is causing the problem.  If we thought it was the gallbladder or liver had been ultrasound but she has had a cholecystectomy.  She feels like that she can follow-up with her GI doctor by calling on Monday.  She is due for an endoscope anyway and we discussed that when she calls to say that she was in the ER over the weekend with this pain and needs to be seen earlier rather than later.  We discussed sending her home with prescriptions to her pharmacy for pain and  nausea which is Reglan that works for her.  We discussed that she is welcome to return here if the pain returns and if there are extenuating circumstances such as vomiting blood that calling an ambulance is a better idea and or directly to the main [facility] ER although she is always welcome here. [AR]   0902 Your blood work is normal today.  Your vital signs are good.   The Reglan worked better than the Zofran for nausea. We discussed that an endoscope is more likely to reveal information if it  is your stomach or esophagus that is causing the problem.  You could have had just 1 drop of acid from your stomach get past the valve of your esophagus into your esophagus and it does not belong in that tissue and will burn and cause pain.  If this is the cause of the pain then raise the head of the bed by putting paperback books under the supports and or wedge pillow so that you are slightly elevated with your chest above your abdomen to reduce any leaking.  This is not too uncommon for people as we age and also in the early morning of hours of sleeping.  We discussed the prescriptions for pain and nausea (Reglan) and Pepcid for stomach acid. You are welcome to return here if the pain returns and if there are extenuating circumstances such as, vomiting blood, that calling an ambulance is a better idea and/or going directly to the main [facility] ER although she is always welcome here anytime.   [AR]      ED Course User Index  [AR] Lili Posada MD      Medications   sodium chloride 0.9 % bolus 1,000 mL (0 mL Intravenous Stopped 2/3/24 0912)   Morphine sulfate (PF) injection 4 mg (4 mg Intravenous Given 2/3/24 0648)   ondansetron (ZOFRAN) injection 4 mg (4 mg Intravenous Given 2/3/24 0648)   pantoprazole (PROTONIX) injection 40 mg (40 mg Intravenous Given 2/3/24 0650)   metoclopramide (REGLAN) injection 10 mg (10 mg Intravenous Given 2/3/24 0736)                                       0700: Pt's care will be transitioned to  Dr. Posada at 0700.  She is currently pending laboratory evaluation results.         Medical Decision Making  Problems Addressed:  Epigastric abdominal pain: complicated acute illness or injury    Amount and/or Complexity of Data Reviewed  Labs: ordered.  ECG/medicine tests: ordered and independent interpretation performed.    Risk  Prescription drug management.        Final diagnoses:   Epigastric abdominal pain       ED Disposition  ED Disposition       ED Disposition   Discharge    Condition   Stable    Comment   --               Shin Orr MD  [address] [id number] [phone]      Call your GI doctor on Monday to set up an appointment and endoscopy.  Also please asked to be placed on the cancellation list if you cannot get in next week.         Medication List        New Prescriptions      famotidine 20 MG tablet  Commonly known as: PEPCID  Take 1 tablet by mouth 2 (Two) Times a Day for 5 days.     metoclopramide 10 MG tablet  Commonly known as: REGLAN  Take 1 tablet by mouth 4 (Four) Times a Day Before Meals & at Bedtime for 5 days.     oxyCODONE-acetaminophen 5-325 MG per tablet  Commonly known as: PERCOCET  Take 1 tablet by mouth Every 6 (Six) Hours As Needed for Moderate Pain for up to 8 doses.               Where to Get Your Medications        These medications were sent to [facility] [id number] - [place], [place] - [address] AT [facility] & [address] [facility] [phone] [facility] [phone]   [address] [facility] [address]      Phone: [phone]   famotidine 20 MG tablet  metoclopramide 10 MG tablet  oxyCODONE-acetaminophen 5-325 MG per tablet

## 2024-02-04 LAB
QT INTERVAL: 435 MS
QTC INTERVAL: 453 MS

## 2024-02-06 ENCOUNTER — TELEPHONE (OUTPATIENT)
Dept: GASTROENTEROLOGY | Facility: CLINIC | Age: 39
End: 2024-02-06
Payer: MEDICAID

## 2024-02-06 NOTE — TELEPHONE ENCOUNTER
Hub staff attempted to follow warm transfer process and was unsuccessful     Caller: Chantelle Vásquez    Relationship to patient: Self    Best call back number: [phone]     Patient is needing: PATIENT IS CALLING TO SCHEDULE SCOPE.  PLEASE CALL BACK.  THANK YOU.

## 2024-02-06 NOTE — TELEPHONE ENCOUNTER
Hub staff attempted to follow warm transfer process and was unsuccessful      Caller: Chantelle Vásquez     Relationship to patient: Self     Best call back number: [phone]      Patient is needing: PATIENT IS CALLING TO SCHEDULE SCOPE.  PLEASE CALL BACK.  THANK YOU.

## 2024-02-09 ENCOUNTER — TELEPHONE (OUTPATIENT)
Dept: GASTROENTEROLOGY | Facility: CLINIC | Age: 39
End: 2024-02-09
Payer: MEDICAID

## 2024-02-09 NOTE — TELEPHONE ENCOUNTER
MANINDER ROBERTO IN SCHEDULING PT SCHEDULED 05/01/2024 ARRIVING AT 12:55 PM EGD PREP INSTRUCTIONS MAILED TO ADDRESS ON FILE VERIFIED BY PT.OK FOR HUB TO READ

## 2024-02-09 NOTE — TELEPHONE ENCOUNTER
MANINDER ROBERTO IN SCHEDULING PT SCHEDULED 05/01/2024 ARRIVING 12:55PM.EGD PREP INSTRUCTIONS MAILED TO ADDRESS ON FILE VERIFIED BY THE PT.OK FOR HUB TO READ

## 2024-02-09 NOTE — TELEPHONE ENCOUNTER
MANINDER ROBERTO IN SCHEDULING PT SCHEDULED 05/01/2024 ARRIVING AT 12:55PM EGD PREP INSTRUCTIONS MAILED TO ADDRESS ON FILE VERIFIED BY PT.OK FOR HUB TO READ

## 2024-02-21 ENCOUNTER — TELEPHONE (OUTPATIENT)
Dept: GASTROENTEROLOGY | Facility: CLINIC | Age: 39
End: 2024-02-21
Payer: MEDICAID

## 2024-02-21 NOTE — TELEPHONE ENCOUNTER
COMMUNICATED WITH PT  THROUGH MY CHART THE OPENING IS NO LONGER AVAILABLE AND I WILL KEEP HER ON THE CANCELLATION WAIT LIST.OK FOR HUB TO READ

## 2024-02-21 NOTE — TELEPHONE ENCOUNTER
Hub staff attempted to follow warm transfer process and was unsuccessful     Caller: Chantelle Vásquez    Relationship to patient: Self    Best call back number: [phone]     Patient is needing: RETURNING CALL FROM [facility] IN [place] TO RESCHEDULING SCOPE TO EARLIER DATE. PLEASE CALL BACK ASAP TO ADVISE WITH SCHEDULING.

## 2024-02-21 NOTE — TELEPHONE ENCOUNTER
Hub staff attempted to follow warm transfer process and was unsuccessful     Caller: Chantelle Vásquez    Relationship to patient: Self    Best call back number: [phone]     Patient is needing: RETURNING CALL FROM [facility] IN [place] TO RESCHEDULING SCOPE TO EARLIER DATE. PLEASE CALL BACK ASAP TO ADVISE WITH SCHEDULING.

## 2024-02-29 ENCOUNTER — OFFICE VISIT (OUTPATIENT)
Dept: GASTROENTEROLOGY | Facility: CLINIC | Age: 39
End: 2024-02-29

## 2024-02-29 ENCOUNTER — TELEPHONE (OUTPATIENT)
Dept: GASTROENTEROLOGY | Facility: CLINIC | Age: 39
End: 2024-02-29
Payer: MEDICAID

## 2024-02-29 VITALS
SYSTOLIC BLOOD PRESSURE: 110 MMHG | WEIGHT: 154 LBS | DIASTOLIC BLOOD PRESSURE: 75 MMHG | HEART RATE: 77 BPM | BODY MASS INDEX: 23.34 KG/M2 | HEIGHT: 68 IN | TEMPERATURE: 97.7 F

## 2024-02-29 DIAGNOSIS — I85.00 ESOPHAGEAL VARICES WITHOUT BLEEDING, UNSPECIFIED ESOPHAGEAL VARICES TYPE: ICD-10-CM

## 2024-02-29 DIAGNOSIS — K31.89 PORTAL HYPERTENSIVE GASTROPATHY: ICD-10-CM

## 2024-02-29 DIAGNOSIS — K76.6 PORTAL HYPERTENSIVE GASTROPATHY: ICD-10-CM

## 2024-02-29 DIAGNOSIS — R10.10 PAIN OF UPPER ABDOMEN: Primary | ICD-10-CM

## 2024-02-29 DIAGNOSIS — Q44.2 CONGENITAL BILIARY ATRESIA: ICD-10-CM

## 2024-02-29 PROCEDURE — 99214 OFFICE O/P EST MOD 30 MIN: CPT | Performed by: NURSE PRACTITIONER

## 2024-02-29 NOTE — PROGRESS NOTES
Chief Complaint   Patient presents with    Abdominal Pain       HPI    Chantelle Vásquez is a  39 y.o. female here for a follow up visit for abdominal pain.    This patient follows with Dr. Castellanos, new to me.    She has a history of congenital biliary atresia, esophageal varices along with portal hypertensive gastropathy.  She has been maintained on Corgard 20 mg once daily and is prescheduled for surveillance EGD with Dr. Castellanos in May.    She was in the emergency room in February complaining of abdominal pain.  Lab work was reassuring.  She was discharged with oxycodone, Reglan x 5 days and Zofran x 5 days.    On visit today patient reports intense abdominal pain localized to the epigastric area but distributed like a band across the upper abdomen occurring the morning she went to the emergency room on February 3.  She had associated nausea and vomiting.  She found medicines prescribed in the ER helpful but did not completely alleviate her issues.  She was subsequently diagnosed with strep throat 2 days later.  She completed antibiotic therapy 2 weeks ago.  She had lingering abdominal pain up until about a week ago with poor appetite.  Over the last several days she has been feeling better.  She has never had symptoms like this before.  Denies dyspepsia, odynophagia or dysphagia.  No weight loss.  No diarrhea or rectal bleeding.    Past Medical History:   Diagnosis Date    Congenital biliary atresia     History of esophageal varices     HTN (hypertension)     Liver disease     Portal hypertension        Past Surgical History:   Procedure Laterality Date     SECTION N/A     CHOLECYSTECTOMY      ENDOSCOPY N/A 2016    normal    ENDOSCOPY N/A 2018    Z-line irregular, at the gastroesophageal junction, small hiatal hernia, erythematous mucosa in the antrum    ENDOSCOPY N/A 2020    Procedure: ESOPHAGOGASTRODUODENOSCOPY WITH BIOPSIES;  Surgeon: Cesar Castellanos MD;  [place] [facility] [place];  Service: Gastroenterology;  Laterality: N/A;  PRE- HX PORTAL HYPERTENSION   POST- GASTRIC POLYP      PORTOENTEROSTOMY KASAI PROCEDURE      TUBAL ABDOMINAL LIGATION N/A     UPPER GASTROINTESTINAL ENDOSCOPY         Scheduled Meds:     Continuous Infusions: No current facility-administered medications for this visit.      PRN Meds:     Allergies   Allergen Reactions    Aspirin Other (See Comments)     AVOIDS DUE TO LIVER HISTORY       Social History     Socioeconomic History    Marital status:    Tobacco Use    Smoking status: Never    Smokeless tobacco: Never   Vaping Use    Vaping Use: Never used   Substance and Sexual Activity    Alcohol use: No    Drug use: No    Sexual activity: Yes     Partners: Male       Family History   Problem Relation Age of Onset    Heart disease Mother     Malig Hyperthermia Neg Hx        Review of Systems   Gastrointestinal:  Positive for abdominal pain, nausea and vomiting.       Vitals:    02/29/24 0934   BP: 110/75   Pulse: 77   Temp: 97.7 °F (36.5 °C)       Physical Exam  Constitutional:       Appearance: She is well-developed.   Abdominal:      General: Bowel sounds are normal. There is no distension.      Palpations: Abdomen is soft. There is no mass.      Tenderness: There is abdominal tenderness. There is no guarding.      Hernia: No hernia is present.   Skin:     General: Skin is warm and dry.      Capillary Refill: Capillary refill takes less than 2 seconds.   Neurological:      Mental Status: She is alert and oriented to person, place, and time.   Psychiatric:         Behavior: Behavior normal.     Assessment    Diagnoses and all orders for this visit:    1. Pain of upper abdomen (Primary)  -     CT Abdomen With Contrast; Future    2. Congenital biliary atresia  -     CT Abdomen With Contrast; Future    3. Esophageal varices without bleeding, unspecified esophageal varices type    4. Portal hypertensive gastropathy       Plan    Delightful 39-year-old female  seen today for upper abdominal pain with a history of congenital biliary atresia, esophageal varices and PHG.  We discussed scheduling a CT of the abdomen with contrast for further evaluation of symptoms and patient is agreeable.  Continue Corgard as prescribed.  We did move her upper endoscopy up 1 month as we did have an opening.  Further recommendations at follow-up pending imaging.         ARIEL Best  [facility]  [address] [place] [address]  Office: [phone]    I spent 30 minutes caring for Chantelle on this date of service. This time includes time spent by me in the following activities: preparing for the visit, reviewing tests, obtaining and/or reviewing a separately obtained history, performing a medically appropriate examination and/or evaluation , counseling and educating the patient/family/caregiver, ordering medications, tests, or procedures, documenting information in the medical record, independently interpreting results and communicating that information with the patient/family/caregiver and care coordination.

## 2024-02-29 NOTE — TELEPHONE ENCOUNTER
MANINDER ROBERTO IN SCHEDULING PER PT REQUEST PT R/S  TO 04/24/2024 ARRIVING AT 3:00PM.EGD PREP INSTRUCTIONS HANDED TO PT

## 2024-02-29 NOTE — H&P (VIEW-ONLY)
Chief Complaint   Patient presents with    Abdominal Pain       HPI    Chantelle Vásquez is a  39 y.o. female here for a follow up visit for abdominal pain.    This patient follows with Dr. Castellanos, new to me.    She has a history of congenital biliary atresia, esophageal varices along with portal hypertensive gastropathy.  She has been maintained on Corgard 20 mg once daily and is prescheduled for surveillance EGD with Dr. Castellanos in May.    She was in the emergency room in February complaining of abdominal pain.  Lab work was reassuring.  She was discharged with oxycodone, Reglan x 5 days and Zofran x 5 days.    On visit today patient reports intense abdominal pain localized to the epigastric area but distributed like a band across the upper abdomen occurring the morning she went to the emergency room on February 3.  She had associated nausea and vomiting.  She found medicines prescribed in the ER helpful but did not completely alleviate her issues.  She was subsequently diagnosed with strep throat 2 days later.  She completed antibiotic therapy 2 weeks ago.  She had lingering abdominal pain up until about a week ago with poor appetite.  Over the last several days she has been feeling better.  She has never had symptoms like this before.  Denies dyspepsia, odynophagia or dysphagia.  No weight loss.  No diarrhea or rectal bleeding.    Past Medical History:   Diagnosis Date    Congenital biliary atresia     History of esophageal varices     HTN (hypertension)     Liver disease     Portal hypertension        Past Surgical History:   Procedure Laterality Date     SECTION N/A     CHOLECYSTECTOMY      ENDOSCOPY N/A 2016    normal    ENDOSCOPY N/A 2018    Z-line irregular, at the gastroesophageal junction, small hiatal hernia, erythematous mucosa in the antrum    ENDOSCOPY N/A 2020    Procedure: ESOPHAGOGASTRODUODENOSCOPY WITH BIOPSIES;  Surgeon: Cesar Castellanos MD;  Location: [facility]  ENDOSCOPY;  Service: Gastroenterology;  Laterality: N/A;  PRE- HX PORTAL HYPERTENSION   POST- GASTRIC POLYP      PORTOENTEROSTOMY KASAI PROCEDURE      TUBAL ABDOMINAL LIGATION N/A     UPPER GASTROINTESTINAL ENDOSCOPY         Scheduled Meds:     Continuous Infusions: No current facility-administered medications for this visit.      PRN Meds:     Allergies   Allergen Reactions    Aspirin Other (See Comments)     AVOIDS DUE TO LIVER HISTORY       Social History     Socioeconomic History    Marital status:    Tobacco Use    Smoking status: Never    Smokeless tobacco: Never   Vaping Use    Vaping Use: Never used   Substance and Sexual Activity    Alcohol use: No    Drug use: No    Sexual activity: Yes     Partners: Male       Family History   Problem Relation Age of Onset    Heart disease Mother     Malig Hyperthermia Neg Hx        Review of Systems   Gastrointestinal:  Positive for abdominal pain, nausea and vomiting.       Vitals:    02/29/24 0934   BP: 110/75   Pulse: 77   Temp: 97.7 °F (36.5 °C)       Physical Exam  Constitutional:       Appearance: She is well-developed.   Abdominal:      General: Bowel sounds are normal. There is no distension.      Palpations: Abdomen is soft. There is no mass.      Tenderness: There is abdominal tenderness. There is no guarding.      Hernia: No hernia is present.   Skin:     General: Skin is warm and dry.      Capillary Refill: Capillary refill takes less than 2 seconds.   Neurological:      Mental Status: She is alert and oriented to person, place, and time.   Psychiatric:         Behavior: Behavior normal.     Assessment    Diagnoses and all orders for this visit:    1. Pain of upper abdomen (Primary)  -     CT Abdomen With Contrast; Future    2. Congenital biliary atresia  -     CT Abdomen With Contrast; Future    3. Esophageal varices without bleeding, unspecified esophageal varices type    4. Portal hypertensive gastropathy       Plan    Delightful 39-year-old female  seen today for upper abdominal pain with a history of congenital biliary atresia, esophageal varices and PHG.  We discussed scheduling a CT of the abdomen with contrast for further evaluation of symptoms and patient is agreeable.  Continue Corgard as prescribed.  We did move her upper endoscopy up 1 month as we did have an opening.  Further recommendations at follow-up pending imaging.         ARIEL Best  [facility]  [address] [place] [address]  Office: [phone]    I spent 30 minutes caring for Chantelle on this date of service. This time includes time spent by me in the following activities: preparing for the visit, reviewing tests, obtaining and/or reviewing a separately obtained history, performing a medically appropriate examination and/or evaluation , counseling and educating the patient/family/caregiver, ordering medications, tests, or procedures, documenting information in the medical record, independently interpreting results and communicating that information with the patient/family/caregiver and care coordination.

## 2024-03-01 ENCOUNTER — APPOINTMENT (OUTPATIENT)
Dept: GENERAL RADIOLOGY | Facility: HOSPITAL | Age: 39
DRG: 871 | End: 2024-03-01

## 2024-03-01 ENCOUNTER — HOSPITAL ENCOUNTER (INPATIENT)
Facility: HOSPITAL | Age: 39
LOS: 7 days | Discharge: HOME | DRG: 871 | End: 2024-03-08
Attending: EMERGENCY MEDICINE

## 2024-03-01 ENCOUNTER — TELEPHONE (OUTPATIENT)
Dept: GASTROENTEROLOGY | Facility: CLINIC | Age: 39
End: 2024-03-01

## 2024-03-01 ENCOUNTER — APPOINTMENT (OUTPATIENT)
Dept: CT IMAGING | Facility: HOSPITAL | Age: 39
DRG: 871 | End: 2024-03-01

## 2024-03-01 DIAGNOSIS — N17.9 AKI (ACUTE KIDNEY INJURY): Primary | ICD-10-CM

## 2024-03-01 DIAGNOSIS — N17.9 SEPSIS WITH ACUTE RENAL FAILURE, DUE TO UNSPECIFIED ORGANISM, UNSPECIFIED ACUTE RENAL FAILURE TYPE, UNSPECIFIED WHETHER SEPTIC SHOCK PRESENT: ICD-10-CM

## 2024-03-01 DIAGNOSIS — A41.9 SEPSIS WITH ACUTE RENAL FAILURE, DUE TO UNSPECIFIED ORGANISM, UNSPECIFIED ACUTE RENAL FAILURE TYPE, UNSPECIFIED WHETHER SEPTIC SHOCK PRESENT: ICD-10-CM

## 2024-03-01 DIAGNOSIS — R10.10 PAIN OF UPPER ABDOMEN: Primary | ICD-10-CM

## 2024-03-01 DIAGNOSIS — N39.0 UTI (URINARY TRACT INFECTION), BACTERIAL: ICD-10-CM

## 2024-03-01 DIAGNOSIS — R50.9 FEVER, UNSPECIFIED FEVER CAUSE: ICD-10-CM

## 2024-03-01 DIAGNOSIS — R65.20 SEPSIS WITH ACUTE RENAL FAILURE, DUE TO UNSPECIFIED ORGANISM, UNSPECIFIED ACUTE RENAL FAILURE TYPE, UNSPECIFIED WHETHER SEPTIC SHOCK PRESENT: ICD-10-CM

## 2024-03-01 DIAGNOSIS — A49.9 UTI (URINARY TRACT INFECTION), BACTERIAL: ICD-10-CM

## 2024-03-01 DIAGNOSIS — Q44.2 CONGENITAL BILIARY ATRESIA: ICD-10-CM

## 2024-03-01 LAB
ALBUMIN SERPL-MCNC: 3.4 G/DL (ref 3.5–5.2)
ALBUMIN/GLOB SERPL: 1.3 G/DL
ALP SERPL-CCNC: 321 U/L (ref 39–117)
ALT SERPL W P-5'-P-CCNC: 154 U/L (ref 1–33)
ANION GAP SERPL CALCULATED.3IONS-SCNC: 13.1 MMOL/L (ref 5–15)
AST SERPL-CCNC: 153 U/L (ref 1–32)
BASOPHILS # BLD AUTO: 0.01 10*3/MM3 (ref 0–0.2)
BASOPHILS NFR BLD AUTO: 0.2 % (ref 0–1.5)
BILIRUB SERPL-MCNC: 6.5 MG/DL (ref 0–1.2)
BILIRUB UR QL STRIP: ABNORMAL
BUN SERPL-MCNC: 21 MG/DL (ref 6–20)
BUN/CREAT SERPL: 7.3 (ref 7–25)
CALCIUM SPEC-SCNC: 8.8 MG/DL (ref 8.6–10.5)
CHLORIDE SERPL-SCNC: 99 MMOL/L (ref 98–107)
CLARITY UR: ABNORMAL
CO2 SERPL-SCNC: 18.9 MMOL/L (ref 22–29)
COLOR UR: ABNORMAL
CREAT SERPL-MCNC: 2.89 MG/DL (ref 0.57–1)
D-LACTATE SERPL-SCNC: 4.4 MMOL/L (ref 0.5–2)
D-LACTATE SERPL-SCNC: 4.5 MMOL/L (ref 0.5–2)
D-LACTATE SERPL-SCNC: 6.1 MMOL/L (ref 0.5–2)
DEPRECATED RDW RBC AUTO: 41.3 FL (ref 37–54)
EGFRCR SERPLBLD CKD-EPI 2021: 20.6 ML/MIN/1.73
EOSINOPHIL # BLD AUTO: 0.04 10*3/MM3 (ref 0–0.4)
EOSINOPHIL NFR BLD AUTO: 0.7 % (ref 0.3–6.2)
ERYTHROCYTE [DISTWIDTH] IN BLOOD BY AUTOMATED COUNT: 12.4 % (ref 12.3–15.4)
GLOBULIN UR ELPH-MCNC: 2.6 GM/DL
GLUCOSE BLDC GLUCOMTR-MCNC: 73 MG/DL (ref 70–130)
GLUCOSE SERPL-MCNC: 115 MG/DL (ref 65–99)
GLUCOSE UR STRIP-MCNC: ABNORMAL MG/DL
HCG SERPL QL: NEGATIVE
HCT VFR BLD AUTO: 35.7 % (ref 34–46.6)
HGB BLD-MCNC: 11.5 G/DL (ref 12–15.9)
HGB UR QL STRIP.AUTO: ABNORMAL
IMM GRANULOCYTES # BLD AUTO: 0.09 10*3/MM3 (ref 0–0.05)
IMM GRANULOCYTES NFR BLD AUTO: 1.5 % (ref 0–0.5)
KETONES UR QL STRIP: ABNORMAL
LEUKOCYTE ESTERASE UR QL STRIP.AUTO: ABNORMAL
LIPASE SERPL-CCNC: 29 U/L (ref 13–60)
LYMPHOCYTES # BLD AUTO: 0.04 10*3/MM3 (ref 0.7–3.1)
LYMPHOCYTES NFR BLD AUTO: 0.7 % (ref 19.6–45.3)
MCH RBC QN AUTO: 29.3 PG (ref 26.6–33)
MCHC RBC AUTO-ENTMCNC: 32.2 G/DL (ref 31.5–35.7)
MCV RBC AUTO: 90.8 FL (ref 79–97)
MONOCYTES # BLD AUTO: 0.16 10*3/MM3 (ref 0.1–0.9)
MONOCYTES NFR BLD AUTO: 2.7 % (ref 5–12)
NEUTROPHILS NFR BLD AUTO: 5.52 10*3/MM3 (ref 1.7–7)
NEUTROPHILS NFR BLD AUTO: 94.2 % (ref 42.7–76)
NITRITE UR QL STRIP: POSITIVE
PH UR STRIP.AUTO: <=5 [PH] (ref 5–8)
PLATELET # BLD AUTO: 122 10*3/MM3 (ref 140–450)
PMV BLD AUTO: 10.6 FL (ref 6–12)
POTASSIUM SERPL-SCNC: 3.3 MMOL/L (ref 3.5–5.2)
PROT SERPL-MCNC: 6 G/DL (ref 6–8.5)
PROT UR QL STRIP: ABNORMAL
RBC # BLD AUTO: 3.93 10*6/MM3 (ref 3.77–5.28)
SODIUM SERPL-SCNC: 131 MMOL/L (ref 136–145)
SP GR UR STRIP: 1.02 (ref 1–1.03)
STREP A PCR: NOT DETECTED
UROBILINOGEN UR QL STRIP: ABNORMAL
WBC NRBC COR # BLD AUTO: 5.86 10*3/MM3 (ref 3.4–10.8)

## 2024-03-01 PROCEDURE — 82948 REAGENT STRIP/BLOOD GLUCOSE: CPT

## 2024-03-01 PROCEDURE — 25810000003 SODIUM CHLORIDE 0.9 % SOLUTION: Performed by: EMERGENCY MEDICINE

## 2024-03-01 PROCEDURE — 83690 ASSAY OF LIPASE: CPT | Performed by: EMERGENCY MEDICINE

## 2024-03-01 PROCEDURE — 74176 CT ABD & PELVIS W/O CONTRAST: CPT

## 2024-03-01 PROCEDURE — 81015 MICROSCOPIC EXAM OF URINE: CPT | Performed by: EMERGENCY MEDICINE

## 2024-03-01 PROCEDURE — 71045 X-RAY EXAM CHEST 1 VIEW: CPT

## 2024-03-01 PROCEDURE — 94761 N-INVAS EAR/PLS OXIMETRY MLT: CPT

## 2024-03-01 PROCEDURE — 25010000002 PIPERACILLIN SOD-TAZOBACTAM PER 1 G: Performed by: EMERGENCY MEDICINE

## 2024-03-01 PROCEDURE — 81003 URINALYSIS AUTO W/O SCOPE: CPT | Performed by: EMERGENCY MEDICINE

## 2024-03-01 PROCEDURE — 94799 UNLISTED PULMONARY SVC/PX: CPT

## 2024-03-01 PROCEDURE — 25810000003 SODIUM CHLORIDE 0.9 % SOLUTION 250 ML FLEX CONT: Performed by: EMERGENCY MEDICINE

## 2024-03-01 PROCEDURE — 84703 CHORIONIC GONADOTROPIN ASSAY: CPT | Performed by: EMERGENCY MEDICINE

## 2024-03-01 PROCEDURE — 87651 STREP A DNA AMP PROBE: CPT | Performed by: EMERGENCY MEDICINE

## 2024-03-01 PROCEDURE — 99285 EMERGENCY DEPT VISIT HI MDM: CPT

## 2024-03-01 PROCEDURE — 83605 ASSAY OF LACTIC ACID: CPT | Performed by: EMERGENCY MEDICINE

## 2024-03-01 PROCEDURE — 87641 MR-STAPH DNA AMP PROBE: CPT | Performed by: INTERNAL MEDICINE

## 2024-03-01 PROCEDURE — 87086 URINE CULTURE/COLONY COUNT: CPT | Performed by: INTERNAL MEDICINE

## 2024-03-01 PROCEDURE — 25810000003 SODIUM CHLORIDE 0.9% - IBW FOR BMI > 30 0.9 % SOLUTION: Performed by: EMERGENCY MEDICINE

## 2024-03-01 PROCEDURE — 84145 PROCALCITONIN (PCT): CPT | Performed by: INTERNAL MEDICINE

## 2024-03-01 PROCEDURE — 25810000003 SODIUM CHLORIDE 0.9 % SOLUTION

## 2024-03-01 PROCEDURE — 25010000002 VANCOMYCIN 1 G RECONSTITUTED SOLUTION 1 EACH VIAL: Performed by: EMERGENCY MEDICINE

## 2024-03-01 PROCEDURE — 81001 URINALYSIS AUTO W/SCOPE: CPT | Performed by: INTERNAL MEDICINE

## 2024-03-01 PROCEDURE — 0202U NFCT DS 22 TRGT SARS-COV-2: CPT | Performed by: INTERNAL MEDICINE

## 2024-03-01 PROCEDURE — 80053 COMPREHEN METABOLIC PANEL: CPT | Performed by: EMERGENCY MEDICINE

## 2024-03-01 PROCEDURE — 85025 COMPLETE CBC W/AUTO DIFF WBC: CPT | Performed by: EMERGENCY MEDICINE

## 2024-03-01 PROCEDURE — 87040 BLOOD CULTURE FOR BACTERIA: CPT | Performed by: EMERGENCY MEDICINE

## 2024-03-01 PROCEDURE — 99284 EMERGENCY DEPT VISIT MOD MDM: CPT | Performed by: EMERGENCY MEDICINE

## 2024-03-01 PROCEDURE — 25010000002 MEROPENEM PER 100 MG: Performed by: INTERNAL MEDICINE

## 2024-03-01 PROCEDURE — 87086 URINE CULTURE/COLONY COUNT: CPT | Performed by: EMERGENCY MEDICINE

## 2024-03-01 RX ORDER — ONDANSETRON 2 MG/ML
4 INJECTION INTRAMUSCULAR; INTRAVENOUS EVERY 6 HOURS PRN
Status: DISCONTINUED | OUTPATIENT
Start: 2024-03-01 | End: 2024-03-08 | Stop reason: HOSPADM

## 2024-03-01 RX ORDER — SODIUM CHLORIDE 9 MG/ML
40 INJECTION, SOLUTION INTRAVENOUS AS NEEDED
Status: DISCONTINUED | OUTPATIENT
Start: 2024-03-01 | End: 2024-03-08 | Stop reason: HOSPADM

## 2024-03-01 RX ORDER — ACETAMINOPHEN 325 MG/1
650 TABLET ORAL EVERY 8 HOURS PRN
Status: DISCONTINUED | OUTPATIENT
Start: 2024-03-01 | End: 2024-03-08 | Stop reason: HOSPADM

## 2024-03-01 RX ORDER — BISACODYL 10 MG
10 SUPPOSITORY, RECTAL RECTAL DAILY PRN
Status: DISCONTINUED | OUTPATIENT
Start: 2024-03-01 | End: 2024-03-08 | Stop reason: HOSPADM

## 2024-03-01 RX ORDER — SODIUM CHLORIDE 0.9 % (FLUSH) 0.9 %
10 SYRINGE (ML) INJECTION EVERY 12 HOURS SCHEDULED
Status: DISCONTINUED | OUTPATIENT
Start: 2024-03-01 | End: 2024-03-08 | Stop reason: HOSPADM

## 2024-03-01 RX ORDER — POLYETHYLENE GLYCOL 3350 17 G/17G
17 POWDER, FOR SOLUTION ORAL DAILY PRN
Status: DISCONTINUED | OUTPATIENT
Start: 2024-03-01 | End: 2024-03-08 | Stop reason: HOSPADM

## 2024-03-01 RX ORDER — POTASSIUM CHLORIDE 750 MG/1
40 TABLET, FILM COATED, EXTENDED RELEASE ORAL ONCE
Status: COMPLETED | OUTPATIENT
Start: 2024-03-01 | End: 2024-03-01

## 2024-03-01 RX ORDER — NITROGLYCERIN 0.4 MG/1
0.4 TABLET SUBLINGUAL
Status: DISCONTINUED | OUTPATIENT
Start: 2024-03-01 | End: 2024-03-08 | Stop reason: HOSPADM

## 2024-03-01 RX ORDER — BISACODYL 5 MG/1
5 TABLET, DELAYED RELEASE ORAL DAILY PRN
Status: DISCONTINUED | OUTPATIENT
Start: 2024-03-01 | End: 2024-03-08 | Stop reason: HOSPADM

## 2024-03-01 RX ORDER — NOREPINEPHRINE BITARTRATE 0.03 MG/ML
.02-.3 INJECTION, SOLUTION INTRAVENOUS
Status: DISCONTINUED | OUTPATIENT
Start: 2024-03-01 | End: 2024-03-04

## 2024-03-01 RX ORDER — VANCOMYCIN HYDROCHLORIDE 1 G/200ML
1000 INJECTION, SOLUTION INTRAVENOUS EVERY 12 HOURS
Status: DISCONTINUED | OUTPATIENT
Start: 2024-03-01 | End: 2024-03-01

## 2024-03-01 RX ORDER — AMOXICILLIN 250 MG
2 CAPSULE ORAL 2 TIMES DAILY
Status: DISCONTINUED | OUTPATIENT
Start: 2024-03-01 | End: 2024-03-08 | Stop reason: HOSPADM

## 2024-03-01 RX ORDER — ACETAMINOPHEN 650 MG/1
650 SUPPOSITORY RECTAL EVERY 8 HOURS PRN
Status: DISCONTINUED | OUTPATIENT
Start: 2024-03-01 | End: 2024-03-08 | Stop reason: HOSPADM

## 2024-03-01 RX ORDER — SODIUM CHLORIDE 0.9 % (FLUSH) 0.9 %
10 SYRINGE (ML) INJECTION AS NEEDED
Status: DISCONTINUED | OUTPATIENT
Start: 2024-03-01 | End: 2024-03-08 | Stop reason: HOSPADM

## 2024-03-01 RX ORDER — SODIUM CHLORIDE 9 MG/ML
150 INJECTION, SOLUTION INTRAVENOUS CONTINUOUS
Status: DISCONTINUED | OUTPATIENT
Start: 2024-03-01 | End: 2024-03-02

## 2024-03-01 RX ADMIN — SODIUM CHLORIDE 125 ML/HR: 9 INJECTION, SOLUTION INTRAVENOUS at 18:33

## 2024-03-01 RX ADMIN — PIPERACILLIN AND TAZOBACTAM 4.5 G: 4; .5 INJECTION, POWDER, FOR SOLUTION INTRAVENOUS at 17:01

## 2024-03-01 RX ADMIN — SODIUM CHLORIDE 2000 ML: 9 INJECTION, SOLUTION INTRAVENOUS at 23:04

## 2024-03-01 RX ADMIN — MEROPENEM 1000 MG: 1 INJECTION, POWDER, FOR SOLUTION INTRAVENOUS at 23:22

## 2024-03-01 RX ADMIN — POTASSIUM CHLORIDE 40 MEQ: 750 TABLET, EXTENDED RELEASE ORAL at 23:25

## 2024-03-01 RX ADMIN — Medication 0.02 MCG/KG/MIN: at 19:26

## 2024-03-01 RX ADMIN — SODIUM CHLORIDE 1000 ML: 9 INJECTION, SOLUTION INTRAVENOUS at 16:12

## 2024-03-01 RX ADMIN — SODIUM CHLORIDE 125 ML/HR: 9 INJECTION, SOLUTION INTRAVENOUS at 16:12

## 2024-03-01 RX ADMIN — SODIUM CHLORIDE 1917 ML: 9 INJECTION, SOLUTION INTRAVENOUS at 17:01

## 2024-03-01 RX ADMIN — VANCOMYCIN HYDROCHLORIDE 1000 MG: 1 INJECTION, POWDER, LYOPHILIZED, FOR SOLUTION INTRAVENOUS at 17:33

## 2024-03-01 RX ADMIN — Medication 10 ML: at 23:03

## 2024-03-01 RX ADMIN — SODIUM CHLORIDE 500 ML: 9 INJECTION, SOLUTION INTRAVENOUS at 18:43

## 2024-03-01 NOTE — TELEPHONE ENCOUNTER
Provider: ARIEL NOWAK    Caller: CAITLYN ROJO    Relationship to Patient: SP[facility]    Pharmacy: [facility]    Phone Number: [phone]    Reason for Call: CAITLYN CALLED BECAUSE STARTING LAST NIGHT THE PT HAS HAD A  SLIGHT FEVER HAS BEEN HAVING THEM OFF AND ON FOR  2-WEEKS.  SD IT WAS SUGGESTED THAT MEDICATION MAY HELP AT APPT YESTERDAY.  WOULD LIKE TO GO FORWARD WITH THIS.    PLEASE CALL AND ADVISE.

## 2024-03-01 NOTE — ED NOTES
Nursing report ED to floor  Chantelle Vásquez  39 y.o.  female    HPI :  HPI (Adult)  Stated Reason for Visit: pt c/o intermittent fever x1 month, states was seen at  today d/t vomiting and diarrhea starting last night, had low BP and temp of 101 at  (neg COVID/Flu swab)  History Obtained From: patient  Duration (Weeks): 4    Chief Complaint  Chief Complaint   Patient presents with    Fever    Vomiting    Diarrhea       Admitting doctor:   Walter Licona MD    Admitting diagnosis:   The primary encounter diagnosis was SERGE (acute kidney injury). Diagnoses of Sepsis with acute renal failure, due to unspecified organism, unspecified acute renal failure type, unspecified whether septic shock present, UTI (urinary tract infection), bacterial, and Congenital cirrhosis were also pertinent to this visit.    Code status:   Current Code Status       Date Active Code Status Order ID Comments User Context       Not on file            Allergies:   Aspirin    Isolation:   No active isolations    Intake and Output  No intake or output data in the 24 hours ending 03/01/24 1753    Weight:       03/01/24  1552   Weight: 69.9 kg (154 lb)       Most recent vitals:   Vitals:    03/01/24 1700 03/01/24 1702 03/01/24 1730 03/01/24 1739   BP:  (!) 82/48 (!) 84/51 (!) 88/52   BP Location:       Patient Position:       Pulse:  114     Resp:  18     Temp: 99 °F (37.2 °C)      TempSrc: Oral      SpO2:  99%     Weight:       Height:           Active LDAs/IV Access:   Lines, Drains & Airways       Active LDAs       Name Placement date Placement time Site Days    Peripheral IV 03/01/24 1610 Right Antecubital 03/01/24  1610  Antecubital  less than 1                    Labs (abnormal labs have a star):   Labs Reviewed   COMPREHENSIVE METABOLIC PANEL - Abnormal; Notable for the following components:       Result Value    Glucose 115 (*)     BUN 21 (*)     Creatinine 2.89 (*)     Sodium 131 (*)     Potassium 3.3 (*)     CO2 18.9 (*)     Albumin 3.4  (*)     ALT (SGPT) 154 (*)     AST (SGOT) 153 (*)     Alkaline Phosphatase 321 (*)     Total Bilirubin 6.5 (*)     eGFR 20.6 (*)     All other components within normal limits    Narrative:     GFR Normal >60  Chronic Kidney Disease <60  Kidney Failure <15     URINALYSIS WITHOUT MICROSCOPIC (NO CULTURE) - Abnormal; Notable for the following components:    Color, UA Dark Yellow (*)     Appearance, UA Cloudy (*)     Glucose,  mg/dL (Trace) (*)     Ketones, UA 15 mg/dL (1+) (*)     Bilirubin, UA Large (3+) (*)     Blood, UA Large (3+) (*)     Protein, UA >=300 mg/dL (3+) (*)     Leuk Esterase, UA Large (3+) (*)     Nitrite, UA Positive (*)     Urobilinogen, UA 4.0 E.U./dL (*)     All other components within normal limits   CBC WITH AUTO DIFFERENTIAL - Abnormal; Notable for the following components:    Hemoglobin 11.5 (*)     Platelets 122 (*)     Neutrophil % 94.2 (*)     Lymphocyte % 0.7 (*)     Monocyte % 2.7 (*)     Immature Grans % 1.5 (*)     Lymphocytes, Absolute 0.04 (*)     Immature Grans, Absolute 0.09 (*)     All other components within normal limits   LACTIC ACID, PLASMA - Abnormal; Notable for the following components:    Lactate 6.1 (*)     All other components within normal limits   RAPID STREP A SCREEN - Normal   LIPASE - Normal   HCG, SERUM, QUALITATIVE - Normal   BLOOD CULTURE   BLOOD CULTURE   URINALYSIS W/ CULTURE IF INDICATED   LACTIC ACID, REFLEX   CBC AND DIFFERENTIAL    Narrative:     The following orders were created for panel order CBC & Differential.  Procedure                               Abnormality         Status                     ---------                               -----------         ------                     CBC Auto Differential[[id number]]        Abnormal            Final result                 Please view results for these tests on the individual orders.       EKG:   No orders to display       Meds given in ED:   Medications   sodium chloride 0.9 % infusion (125 mL/hr  Intravenous New Bag 3/1/24 1612)   vancomycin (VANCOCIN) 1,000 mg in sodium chloride 0.9 % 250 mL IVPB-VTB (1,000 mg Intravenous New Bag 3/1/24 1733)   sodium chloride 0.9% - IBW for BMI > 30 bolus 1,917 mL (1,917 mL Intravenous New Bag 3/1/24 1701)   sodium chloride 0.9 % bolus 1,000 mL (0 mL Intravenous Stopped 3/1/24 1701)   piperacillin-tazobactam (ZOSYN) IVPB 4.5 g IVPB in 100 mL NS (VTB) (0 g Intravenous Stopped 3/1/24 1734)       Imaging results:  CT Abdomen Pelvis Without Contrast    Result Date: 3/1/2024   1. Cirrhosis with portal hypertension (splenomegaly, portosystemic collateral vessels and small amount of ascites). 2. Steatosis seen in the left hepatic lobe.  This report was finalized on 3/1/2024 5:17 PM by Dr. Dave Dixon M.D on Workstation: [id number]       Ambulatory status:   - up ad mueksh    Social issues:   Social History     Socioeconomic History    Marital status:    Tobacco Use    Smoking status: Never    Smokeless tobacco: Never   Vaping Use    Vaping Use: Never used   Substance and Sexual Activity    Alcohol use: No    Drug use: No    Sexual activity: Yes     Partners: Male       Peripheral Neurovascular  Peripheral Neurovascular (Adult)  Peripheral Neurovascular WDL: WDL    Neuro Cognitive  Neuro Cognitive (Adult)  Cognitive/Neuro/Behavioral WDL: WDL    Learning  Learning Assessment (Adult)  Learning Readiness and Ability: no barriers identified  Education Provided  Person Taught: patient  Teaching Method: verbal instruction  Teaching Focus: diagnostic test    Respiratory  Respiratory WDL  Respiratory WDL: WDL    Abdominal Pain       Pain Assessments  Pain (Adult)  (0-10) Pain Rating: Rest: 0    NIH Stroke Scale       Braden Ledesma RN  03/01/24 17:53 EST

## 2024-03-01 NOTE — TELEPHONE ENCOUNTER
Patient was feeling better when seen in the office.  I ordered imaging of the abdomen.  Lets get updated lab work based on this phone call and a urinalysis which I have ordered.

## 2024-03-01 NOTE — TELEPHONE ENCOUNTER
Called . Informed him per Cristal's message. He stated he was on his way home to the patient and would call back to get lab work scheduled.

## 2024-03-01 NOTE — FSED PROVIDER NOTE
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  Pt reports 2d hx decreased urinary output \"dark\" urine.  ROS (+) sore throat/cough/diarrhea x2 episodes.  Denies chest pain/abd pain/vomiting.    History provided by:  Patient  Weakness - Generalized  Associated symptoms: cough and diarrhea    Associated symptoms: no abdominal pain, no nausea and no vomiting        Review of Systems   Constitutional:  Positive for appetite change and fatigue.   HENT:  Positive for sore throat.    Eyes: Negative.    Respiratory:  Positive for cough.    Cardiovascular: Negative.    Gastrointestinal:  Positive for diarrhea. Negative for abdominal pain, nausea and vomiting.   Genitourinary:  Positive for decreased urine volume.   Allergic/Immunologic: Positive for immunocompromised state.   All other systems reviewed and are negative.      Past Medical History:   Diagnosis Date    Congenital biliary atresia     History of esophageal varices     HTN (hypertension)     Liver disease     Portal hypertension        Allergies   Allergen Reactions    Aspirin Other (See Comments)     AVOIDS DUE TO LIVER HISTORY       Past Surgical History:   Procedure Laterality Date     SECTION N/A     CHOLECYSTECTOMY      ENDOSCOPY N/A 2016    normal    ENDOSCOPY N/A 2018    Z-line irregular, at the gastroesophageal junction, small hiatal hernia, erythematous mucosa in the antrum    ENDOSCOPY N/A 2020    Procedure: ESOPHAGOGASTRODUODENOSCOPY WITH BIOPSIES;  Surgeon: Cesar Castellanos MD;  Location: [facility] ENDOSCOPY;  Service: Gastroenterology;  Laterality: N/A;  PRE- HX PORTAL HYPERTENSION   POST- GASTRIC POLYP      PORTOENTEROSTOMY KASAI PROCEDURE      TUBAL ABDOMINAL LIGATION N/A     UPPER " GASTROINTESTINAL ENDOSCOPY         Family History   Problem Relation Age of Onset    Heart disease Mother     Malig Hyperthermia Neg Hx        Social History     Socioeconomic History    Marital status:    Tobacco Use    Smoking status: Never    Smokeless tobacco: Never   Vaping Use    Vaping Use: Never used   Substance and Sexual Activity    Alcohol use: No    Drug use: No    Sexual activity: Yes     Partners: Male           Objective   Physical Exam  Vitals and nursing note reviewed.   Constitutional:       Appearance: She is ill-appearing.   HENT:      Head: Normocephalic and atraumatic.      Right Ear: External ear normal.      Left Ear: External ear normal.      Nose: Nose normal.      Mouth/Throat:      Mouth: Mucous membranes are dry.      Pharynx: Oropharynx is clear. No oropharyngeal exudate or posterior oropharyngeal erythema.   Eyes:      Pupils: Pupils are equal, round, and reactive to light.   Cardiovascular:      Rate and Rhythm: Normal rate and regular rhythm.      Pulses: Normal pulses.      Heart sounds: Normal heart sounds. No murmur heard.     No friction rub. No gallop.   Pulmonary:      Effort: Pulmonary effort is normal. No respiratory distress.      Breath sounds: Normal breath sounds. No wheezing, rhonchi or rales.   Abdominal:      General: Abdomen is flat. Bowel sounds are normal. There is no distension.      Palpations: Abdomen is soft.      Tenderness: There is no abdominal tenderness. There is no right CVA tenderness, left CVA tenderness, guarding or rebound.   Musculoskeletal:         General: No swelling or deformity.      Cervical back: Normal range of motion and neck supple. No rigidity.      Right lower leg: No edema.      Left lower leg: No edema.   Lymphadenopathy:      Cervical: No cervical adenopathy.   Skin:     General: Skin is warm and dry.      Coloration: Skin is jaundiced.   Neurological:      General: No focal deficit present.      Mental Status: She is alert and  oriented to person, place, and time.      GCS: GCS eye subscore is 4. GCS verbal subscore is 5. GCS motor subscore is 6.      Sensory: Sensation is intact.      Motor: Motor function is intact.         Procedures           ED Course  ED Course as of 03/01/24 1746   Fri Mar 01, 2024   1741 [facility] intensivist paged [SD]   1744 D/w Dr. Licona, [facility] intensivist accepting. Pt stable/critical transport. [SD]      ED Course User Index  [SD] Jagdish Irby MD      Labs Reviewed   COMPREHENSIVE METABOLIC PANEL - Abnormal; Notable for the following components:       Result Value    Glucose 115 (*)     BUN 21 (*)     Creatinine 2.89 (*)     Sodium 131 (*)     Potassium 3.3 (*)     CO2 18.9 (*)     Albumin 3.4 (*)     ALT (SGPT) 154 (*)     AST (SGOT) 153 (*)     Alkaline Phosphatase 321 (*)     Total Bilirubin 6.5 (*)     eGFR 20.6 (*)     All other components within normal limits    Narrative:     GFR Normal >60  Chronic Kidney Disease <60  Kidney Failure <15     URINALYSIS WITHOUT MICROSCOPIC (NO CULTURE) - Abnormal; Notable for the following components:    Color, UA Dark Yellow (*)     Appearance, UA Cloudy (*)     Glucose,  mg/dL (Trace) (*)     Ketones, UA 15 mg/dL (1+) (*)     Bilirubin, UA Large (3+) (*)     Blood, UA Large (3+) (*)     Protein, UA >=300 mg/dL (3+) (*)     Leuk Esterase, UA Large (3+) (*)     Nitrite, UA Positive (*)     Urobilinogen, UA 4.0 E.U./dL (*)     All other components within normal limits   CBC WITH AUTO DIFFERENTIAL - Abnormal; Notable for the following components:    Hemoglobin 11.5 (*)     Platelets 122 (*)     Neutrophil % 94.2 (*)     Lymphocyte % 0.7 (*)     Monocyte % 2.7 (*)     Immature Grans % 1.5 (*)     Lymphocytes, Absolute 0.04 (*)     Immature Grans, Absolute 0.09 (*)     All other components within normal limits   LACTIC ACID, PLASMA - Abnormal; Notable for the following components:    Lactate 6.1 (*)     All other components within normal limits   RAPID STREP A  SCREEN - Normal   LIPASE - Normal   HCG, SERUM, QUALITATIVE - Normal   BLOOD CULTURE   BLOOD CULTURE   URINALYSIS W/ CULTURE IF INDICATED   LACTIC ACID, REFLEX   CBC AND DIFFERENTIAL    Narrative:     The following orders were created for panel order CBC & Differential.  Procedure                               Abnormality         Status                     ---------                               -----------         ------                     CBC Auto Differential[[id number]]        Abnormal            Final result                 Please view results for these tests on the individual orders.     XR Chest 1 View    Result Date: 3/1/2024  Narrative: PORTABLE CHEST  HISTORY: Sepsis, fever.  COMPARISON: None.  FINDINGS: A single portable view of the chest demonstrates the heart to be within normal limits in size. There is no evidence of infiltrate, effusion or of congestive failure.      CT Abdomen Pelvis Without Contrast    Result Date: 3/1/2024  Narrative: CT ABDOMEN PELVIS WO CONTRAST-  INDICATION: Sepsis  COMPARISON: None  TECHNIQUE: Routine CT abdomen and pelvis without IV contrast. Coronal and sagittal reformats. Radiation dose reduction techniques were utilized, including automated exposure control and exposure modulation based on body size.  FINDINGS:  Lung bases: Subsegmental atelectasis at the bases. Trace pericardial fluid.  ABDOMEN: Cirrhotic morphology. Hepatic steatosis seen in the left hepatic lobe and caudate. Gallbladder is not seen. No biliary ductal dilatation. Splenomegaly with the spleen measuring 18.4 cm in craniocaudal dimension. No pancreatic mass or ductal dilatation seen. Mild thickening of the left adrenal gland. No urolithiasis or hydronephrosis. Symmetric perinephric edema.  Pelvis: Bladder is collapsed. No bladder calculus. Tampon in the vagina. Retroverted uterus. Tubal ligation clips. No adnexal mass. Small amount of free fluid in the cul-de-sac extending into the adnexa.  Bowel: No  obstruction. Loose stool seen in the rectum. Normal appendix.  Abdominal wall: Rectus diastases. Small fat-containing umbilical hernia. Pelvic wall scarring.  Retroperitoneum: Multiple subcentimeter abdominal retroperitoneal lymph nodes.  Vasculature: Suspected recanalization of the periumbilical vein with large collateral vessel seen beneath the midline abdominal wall, extending to the pelvis and communicate with the external iliac veins, consistent with portosystemic shunt. Suspect enlarged portal vein and splenic vein, suboptimally evaluated without contrast. Perisplenic and perigastric collaterals.  Osseous structures: No destructive osseous lesions.      Impression:  1. Cirrhosis with portal hypertension (splenomegaly, portosystemic collateral vessels and small amount of ascites). 2. Steatosis seen in the left hepatic lobe.  This report was finalized on 3/1/2024 5:17 PM by Dr. Dave Dixon M.D on Workstation: [id number]                                          Medical Decision Making  Problems Addressed:  SERGE (acute kidney injury): complicated acute illness or injury  Congenital cirrhosis: complicated acute illness or injury  Sepsis with acute renal failure, due to unspecified organism, unspecified acute renal failure type, unspecified whether septic shock present: complicated acute illness or injury  UTI (urinary tract infection), bacterial: complicated acute illness or injury    Amount and/or Complexity of Data Reviewed  Labs: ordered.  Radiology: ordered.    Risk  Prescription drug management.  Decision regarding hospitalization.        Final diagnoses:   SERGE (acute kidney injury)   Sepsis with acute renal failure, due to unspecified organism, unspecified acute renal failure type, unspecified whether septic shock present   UTI (urinary tract infection), bacterial   Congenital cirrhosis       ED Disposition  ED Disposition       ED Disposition   Decision to Admit    Condition   --    Comment   Level of  Care: Critical Care [6]   Diagnosis: Sepsis [[id number]]   Admitting Physician: TAM ORTIZ [[id number]]   Attending Physician: TAM ORTIZ [[id number]]   Certification: I Certify That Inpatient Hospital Services Are Medically Necessary For Greater Than 2 Midnights                 No follow-up provider specified.       Medication List      No changes were made to your prescriptions during this visit.

## 2024-03-02 ENCOUNTER — APPOINTMENT (OUTPATIENT)
Dept: GENERAL RADIOLOGY | Facility: HOSPITAL | Age: 39
DRG: 871 | End: 2024-03-02

## 2024-03-02 LAB
ABO GROUP BLD: NORMAL
ALBUMIN SERPL-MCNC: 2.9 G/DL (ref 3.5–5.2)
ALBUMIN SERPL-MCNC: 3.3 G/DL (ref 3.5–5.2)
ALBUMIN/GLOB SERPL: 1.4 G/DL
ALBUMIN/GLOB SERPL: 1.7 G/DL
ALP SERPL-CCNC: 129 U/L (ref 39–117)
ALP SERPL-CCNC: 159 U/L (ref 39–117)
ALT SERPL W P-5'-P-CCNC: 114 U/L (ref 1–33)
ALT SERPL W P-5'-P-CCNC: 81 U/L (ref 1–33)
AMORPH URATE CRY URNS QL MICRO: ABNORMAL /HPF
ANION GAP SERPL CALCULATED.3IONS-SCNC: 15 MMOL/L (ref 5–15)
ANION GAP SERPL CALCULATED.3IONS-SCNC: 15.4 MMOL/L (ref 5–15)
ANION GAP SERPL CALCULATED.3IONS-SCNC: 18.9 MMOL/L (ref 5–15)
APAP SERPL-MCNC: <5 MCG/ML (ref 0–30)
AST SERPL-CCNC: 50 U/L (ref 1–32)
AST SERPL-CCNC: 83 U/L (ref 1–32)
ATMOSPHERIC PRESS: 750.5 MMHG
B PARAPERT DNA SPEC QL NAA+PROBE: NOT DETECTED
B PERT DNA SPEC QL NAA+PROBE: NOT DETECTED
BACTERIA UR QL AUTO: ABNORMAL /HPF
BACTERIA UR QL AUTO: ABNORMAL /HPF
BASE EXCESS BLDV CALC-SCNC: -12.2 MMOL/L (ref -2–2)
BILIRUB SERPL-MCNC: 5.5 MG/DL (ref 0–1.2)
BILIRUB SERPL-MCNC: 6 MG/DL (ref 0–1.2)
BILIRUB UR QL STRIP: ABNORMAL
BLD GP AB SCN SERPL QL: NEGATIVE
BUN SERPL-MCNC: 29 MG/DL (ref 6–20)
BUN SERPL-MCNC: 32 MG/DL (ref 6–20)
BUN SERPL-MCNC: 34 MG/DL (ref 6–20)
BUN/CREAT SERPL: 10.9 (ref 7–25)
BUN/CREAT SERPL: 12.5 (ref 7–25)
BUN/CREAT SERPL: 13.9 (ref 7–25)
BURR CELLS BLD QL SMEAR: ABNORMAL
C PNEUM DNA NPH QL NAA+NON-PROBE: NOT DETECTED
CALCIUM SPEC-SCNC: 6.2 MG/DL (ref 8.6–10.5)
CALCIUM SPEC-SCNC: 6.4 MG/DL (ref 8.6–10.5)
CALCIUM SPEC-SCNC: 6.9 MG/DL (ref 8.6–10.5)
CHLORIDE SERPL-SCNC: 104 MMOL/L (ref 98–107)
CHLORIDE SERPL-SCNC: 104 MMOL/L (ref 98–107)
CHLORIDE SERPL-SCNC: 105 MMOL/L (ref 98–107)
CLARITY UR: ABNORMAL
CO2 BLDA-SCNC: 14.1 MMOL/L (ref 23–27)
CO2 SERPL-SCNC: 13.1 MMOL/L (ref 22–29)
CO2 SERPL-SCNC: 14 MMOL/L (ref 22–29)
CO2 SERPL-SCNC: 17.6 MMOL/L (ref 22–29)
COLOR UR: ABNORMAL
CORTIS SERPL-MCNC: 43.8 MCG/DL
CREAT SERPL-MCNC: 2.45 MG/DL (ref 0.57–1)
CREAT SERPL-MCNC: 2.57 MG/DL (ref 0.57–1)
CREAT SERPL-MCNC: 2.65 MG/DL (ref 0.57–1)
CREAT UR-MCNC: 117.8 MG/DL
D-LACTATE SERPL-SCNC: 3.1 MMOL/L (ref 0.5–2)
D-LACTATE SERPL-SCNC: 3.5 MMOL/L (ref 0.5–2)
D-LACTATE SERPL-SCNC: 4.6 MMOL/L (ref 0.5–2)
DEPRECATED RDW RBC AUTO: 39.7 FL (ref 37–54)
DEVICE COMMENT: ABNORMAL
EGFRCR SERPLBLD CKD-EPI 2021: 22.9 ML/MIN/1.73
EGFRCR SERPLBLD CKD-EPI 2021: 23.7 ML/MIN/1.73
EGFRCR SERPLBLD CKD-EPI 2021: 25.1 ML/MIN/1.73
EOSINOPHIL # BLD MANUAL: 0.07 10*3/MM3 (ref 0–0.4)
EOSINOPHIL NFR BLD MANUAL: 1 % (ref 0.3–6.2)
ERYTHROCYTE [DISTWIDTH] IN BLOOD BY AUTOMATED COUNT: 12.4 % (ref 12.3–15.4)
FLUAV SUBTYP SPEC NAA+PROBE: NOT DETECTED
FLUBV RNA ISLT QL NAA+PROBE: NOT DETECTED
GAS FLOW AIRWAY: 2 LPM
GLOBULIN UR ELPH-MCNC: 1.9 GM/DL
GLOBULIN UR ELPH-MCNC: 2.1 GM/DL
GLUCOSE BLDC GLUCOMTR-MCNC: 139 MG/DL (ref 70–130)
GLUCOSE BLDC GLUCOMTR-MCNC: 91 MG/DL (ref 70–130)
GLUCOSE SERPL-MCNC: 145 MG/DL (ref 65–99)
GLUCOSE SERPL-MCNC: 84 MG/DL (ref 65–99)
GLUCOSE SERPL-MCNC: 99 MG/DL (ref 65–99)
GLUCOSE UR STRIP-MCNC: NEGATIVE MG/DL
GRAN CASTS URNS QL MICRO: ABNORMAL /LPF
HADV DNA SPEC NAA+PROBE: NOT DETECTED
HBV SURFACE AG SERPL QL IA: NORMAL
HCO3 BLDV-SCNC: 13.3 MMOL/L (ref 22–28)
HCOV 229E RNA SPEC QL NAA+PROBE: NOT DETECTED
HCOV HKU1 RNA SPEC QL NAA+PROBE: NOT DETECTED
HCOV NL63 RNA SPEC QL NAA+PROBE: NOT DETECTED
HCOV OC43 RNA SPEC QL NAA+PROBE: NOT DETECTED
HCT VFR BLD AUTO: 30.3 % (ref 34–46.6)
HGB BLD-MCNC: 10.1 G/DL (ref 12–15.9)
HGB UR QL STRIP.AUTO: ABNORMAL
HMPV RNA NPH QL NAA+NON-PROBE: NOT DETECTED
HPIV1 RNA ISLT QL NAA+PROBE: NOT DETECTED
HPIV2 RNA SPEC QL NAA+PROBE: NOT DETECTED
HPIV3 RNA NPH QL NAA+PROBE: NOT DETECTED
HPIV4 P GENE NPH QL NAA+PROBE: NOT DETECTED
HYALINE CASTS UR QL AUTO: ABNORMAL /LPF
HYALINE CASTS UR QL AUTO: ABNORMAL /LPF
INR PPP: 2.05 (ref 0.9–1.1)
KETONES UR QL STRIP: ABNORMAL
LEUKOCYTE ESTERASE UR QL STRIP.AUTO: ABNORMAL
LYMPHOCYTES # BLD MANUAL: 0 10*3/MM3 (ref 0.7–3.1)
LYMPHOCYTES NFR BLD MANUAL: 6.1 % (ref 5–12)
M PNEUMO IGG SER IA-ACNC: NOT DETECTED
MAGNESIUM SERPL-MCNC: 1.2 MG/DL (ref 1.6–2.6)
MAGNESIUM SERPL-MCNC: 2.3 MG/DL (ref 1.6–2.6)
MCH RBC QN AUTO: 29.3 PG (ref 26.6–33)
MCHC RBC AUTO-ENTMCNC: 33.3 G/DL (ref 31.5–35.7)
MCV RBC AUTO: 87.8 FL (ref 79–97)
MODALITY: ABNORMAL
MONOCYTES # BLD: 0.42 10*3/MM3 (ref 0.1–0.9)
MRSA DNA SPEC QL NAA+PROBE: NORMAL
NEUTROPHILS # BLD AUTO: 6.44 10*3/MM3 (ref 1.7–7)
NEUTROPHILS NFR BLD MANUAL: 92.9 % (ref 42.7–76)
NITRITE UR QL STRIP: POSITIVE
NOTIFIED WHO: ABNORMAL
PCO2 BLDV: 28.4 MM HG (ref 41–51)
PH BLDV: 7.28 PH UNITS (ref 7.31–7.41)
PH UR STRIP.AUTO: <=5 [PH] (ref 5–8)
PLAT MORPH BLD: NORMAL
PLATELET # BLD AUTO: 117 10*3/MM3 (ref 140–450)
PMV BLD AUTO: 10.7 FL (ref 6–12)
PO2 BLDV: 55.5 MM HG (ref 35–45)
POIKILOCYTOSIS BLD QL SMEAR: ABNORMAL
POLYCHROMASIA BLD QL SMEAR: ABNORMAL
POTASSIUM SERPL-SCNC: 3.9 MMOL/L (ref 3.5–5.2)
POTASSIUM SERPL-SCNC: 4.7 MMOL/L (ref 3.5–5.2)
POTASSIUM SERPL-SCNC: 5 MMOL/L (ref 3.5–5.2)
PROCALCITONIN SERPL-MCNC: 15.2 NG/ML (ref 0–0.25)
PROCALCITONIN SERPL-MCNC: 16.9 NG/ML (ref 0–0.25)
PROT SERPL-MCNC: 5 G/DL (ref 6–8.5)
PROT SERPL-MCNC: 5.2 G/DL (ref 6–8.5)
PROT UR QL STRIP: ABNORMAL
PROTHROMBIN TIME: 23.5 SECONDS (ref 11.7–14.2)
QT INTERVAL: 323 MS
QTC INTERVAL: 438 MS
RBC # BLD AUTO: 3.45 10*6/MM3 (ref 3.77–5.28)
RBC # UR STRIP: ABNORMAL /HPF
RBC # UR STRIP: ABNORMAL /HPF
REF LAB TEST METHOD: ABNORMAL
REF LAB TEST METHOD: ABNORMAL
RH BLD: POSITIVE
RHINOVIRUS RNA SPEC NAA+PROBE: NOT DETECTED
RSV RNA NPH QL NAA+NON-PROBE: NOT DETECTED
SALICYLATES SERPL-MCNC: <0.3 MG/DL
SAO2 % BLDCOV: 85.2 % (ref 45–75)
SARS-COV-2 RNA NPH QL NAA+NON-PROBE: NOT DETECTED
SODIUM SERPL-SCNC: 133 MMOL/L (ref 136–145)
SODIUM SERPL-SCNC: 137 MMOL/L (ref 136–145)
SODIUM SERPL-SCNC: 137 MMOL/L (ref 136–145)
SODIUM UR-SCNC: 44 MMOL/L
SP GR UR STRIP: 1.02 (ref 1–1.03)
SPHEROCYTES BLD QL SMEAR: ABNORMAL
SQUAMOUS #/AREA URNS HPF: ABNORMAL /HPF
SQUAMOUS #/AREA URNS HPF: ABNORMAL /HPF
T&S EXPIRATION DATE: NORMAL
TOTAL RATE: 22 BREATHS/MINUTE
TSH SERPL DL<=0.05 MIU/L-ACNC: 0.51 UIU/ML (ref 0.27–4.2)
UROBILINOGEN UR QL STRIP: ABNORMAL
VANCOMYCIN SERPL-MCNC: 10.4 MCG/ML (ref 5–40)
VARIANT LYMPHS NFR BLD MANUAL: 0 % (ref 19.6–45.3)
WBC # UR STRIP: ABNORMAL /HPF
WBC # UR STRIP: ABNORMAL /HPF
WBC MORPH BLD: NORMAL
WBC NRBC COR # BLD AUTO: 6.93 10*3/MM3 (ref 3.4–10.8)

## 2024-03-02 PROCEDURE — 82948 REAGENT STRIP/BLOOD GLUCOSE: CPT

## 2024-03-02 PROCEDURE — 80202 ASSAY OF VANCOMYCIN: CPT | Performed by: INTERNAL MEDICINE

## 2024-03-02 PROCEDURE — 80053 COMPREHEN METABOLIC PANEL: CPT | Performed by: INTERNAL MEDICINE

## 2024-03-02 PROCEDURE — 83605 ASSAY OF LACTIC ACID: CPT | Performed by: EMERGENCY MEDICINE

## 2024-03-02 PROCEDURE — 25010000002 PHENYLEPHRINE 10 MG/ML SOLUTION

## 2024-03-02 PROCEDURE — 82533 TOTAL CORTISOL: CPT

## 2024-03-02 PROCEDURE — P9059 PLASMA, FRZ BETWEEN 8-24HOUR: HCPCS

## 2024-03-02 PROCEDURE — 25810000003 LACTATED RINGERS SOLUTION

## 2024-03-02 PROCEDURE — 87340 HEPATITIS B SURFACE AG IA: CPT | Performed by: INTERNAL MEDICINE

## 2024-03-02 PROCEDURE — 25010000002 CALCIUM GLUCONATE 2-0.675 GM/100ML-% SOLUTION: Performed by: INTERNAL MEDICINE

## 2024-03-02 PROCEDURE — 82570 ASSAY OF URINE CREATININE: CPT

## 2024-03-02 PROCEDURE — 86927 PLASMA FRESH FROZEN: CPT

## 2024-03-02 PROCEDURE — 25810000003 SODIUM CHLORIDE 0.9 % SOLUTION: Performed by: INTERNAL MEDICINE

## 2024-03-02 PROCEDURE — 93010 ELECTROCARDIOGRAM REPORT: CPT | Performed by: INTERNAL MEDICINE

## 2024-03-02 PROCEDURE — 25010000002 VANCOMYCIN PER 500 MG: Performed by: INTERNAL MEDICINE

## 2024-03-02 PROCEDURE — 82803 BLOOD GASES ANY COMBINATION: CPT

## 2024-03-02 PROCEDURE — 86901 BLOOD TYPING SEROLOGIC RH(D): CPT

## 2024-03-02 PROCEDURE — 85007 BL SMEAR W/DIFF WBC COUNT: CPT

## 2024-03-02 PROCEDURE — 71045 X-RAY EXAM CHEST 1 VIEW: CPT

## 2024-03-02 PROCEDURE — 25010000002 PIPERACILLIN SOD-TAZOBACTAM PER 1 G: Performed by: INTERNAL MEDICINE

## 2024-03-02 PROCEDURE — 83735 ASSAY OF MAGNESIUM: CPT

## 2024-03-02 PROCEDURE — 86900 BLOOD TYPING SEROLOGIC ABO: CPT

## 2024-03-02 PROCEDURE — 84145 PROCALCITONIN (PCT): CPT | Performed by: INTERNAL MEDICINE

## 2024-03-02 PROCEDURE — 25010000002 HYDROCORTISONE SOD SUC (PF) 100 MG RECONSTITUTED SOLUTION: Performed by: INTERNAL MEDICINE

## 2024-03-02 PROCEDURE — 36430 TRANSFUSION BLD/BLD COMPNT: CPT

## 2024-03-02 PROCEDURE — 86850 RBC ANTIBODY SCREEN: CPT

## 2024-03-02 PROCEDURE — 83735 ASSAY OF MAGNESIUM: CPT | Performed by: INTERNAL MEDICINE

## 2024-03-02 PROCEDURE — 02HV33Z INSERTION OF INFUSION DEVICE INTO SUPERIOR VENA CAVA, PERCUTANEOUS APPROACH: ICD-10-PCS | Performed by: INTERNAL MEDICINE

## 2024-03-02 PROCEDURE — 85025 COMPLETE CBC W/AUTO DIFF WBC: CPT

## 2024-03-02 PROCEDURE — 25810000003 LACTATED RINGERS PER 1000 ML

## 2024-03-02 PROCEDURE — 99255 IP/OBS CONSLTJ NEW/EST HI 80: CPT | Performed by: INTERNAL MEDICINE

## 2024-03-02 PROCEDURE — 25010000002 VASOPRESSIN 20 UNIT/ML SOLUTION

## 2024-03-02 PROCEDURE — P9047 ALBUMIN (HUMAN), 25%, 50ML: HCPCS | Performed by: INTERNAL MEDICINE

## 2024-03-02 PROCEDURE — 80179 DRUG ASSAY SALICYLATE: CPT

## 2024-03-02 PROCEDURE — 85610 PROTHROMBIN TIME: CPT

## 2024-03-02 PROCEDURE — 84443 ASSAY THYROID STIM HORMONE: CPT

## 2024-03-02 PROCEDURE — 25010000002 ALBUMIN HUMAN 25% PER 50 ML: Performed by: INTERNAL MEDICINE

## 2024-03-02 PROCEDURE — 25010000002 MAGNESIUM SULFATE 4 GM/100ML SOLUTION: Performed by: INTERNAL MEDICINE

## 2024-03-02 PROCEDURE — 80143 DRUG ASSAY ACETAMINOPHEN: CPT

## 2024-03-02 PROCEDURE — 93005 ELECTROCARDIOGRAM TRACING: CPT

## 2024-03-02 PROCEDURE — 84300 ASSAY OF URINE SODIUM: CPT

## 2024-03-02 PROCEDURE — 99254 IP/OBS CNSLTJ NEW/EST MOD 60: CPT | Performed by: INTERNAL MEDICINE

## 2024-03-02 PROCEDURE — 25810000003 SODIUM CHLORIDE 0.9 % SOLUTION

## 2024-03-02 RX ORDER — ALBUMIN (HUMAN) 12.5 G/50ML
25 SOLUTION INTRAVENOUS EVERY 6 HOURS
Status: COMPLETED | OUTPATIENT
Start: 2024-03-02 | End: 2024-03-02

## 2024-03-02 RX ORDER — PHENYLEPHRINE HCL IN 0.9% NACL 0.5 MG/5ML
.5-3 SYRINGE (ML) INTRAVENOUS
Status: DISCONTINUED | OUTPATIENT
Start: 2024-03-02 | End: 2024-03-04

## 2024-03-02 RX ORDER — MIDODRINE HYDROCHLORIDE 5 MG/1
10 TABLET ORAL
Status: DISCONTINUED | OUTPATIENT
Start: 2024-03-02 | End: 2024-03-03

## 2024-03-02 RX ORDER — MAGNESIUM SULFATE HEPTAHYDRATE 40 MG/ML
4 INJECTION, SOLUTION INTRAVENOUS ONCE
Status: COMPLETED | OUTPATIENT
Start: 2024-03-02 | End: 2024-03-02

## 2024-03-02 RX ORDER — SODIUM CHLORIDE, SODIUM LACTATE, POTASSIUM CHLORIDE, CALCIUM CHLORIDE 600; 310; 30; 20 MG/100ML; MG/100ML; MG/100ML; MG/100ML
150 INJECTION, SOLUTION INTRAVENOUS CONTINUOUS
Status: DISCONTINUED | OUTPATIENT
Start: 2024-03-02 | End: 2024-03-02

## 2024-03-02 RX ORDER — CALCIUM GLUCONATE 20 MG/ML
2000 INJECTION, SOLUTION INTRAVENOUS ONCE
Status: COMPLETED | OUTPATIENT
Start: 2024-03-02 | End: 2024-03-02

## 2024-03-02 RX ORDER — VANCOMYCIN HYDROCHLORIDE 1 G/200ML
1000 INJECTION, SOLUTION INTRAVENOUS ONCE
Qty: 200 ML | Refills: 0 | Status: COMPLETED | OUTPATIENT
Start: 2024-03-02 | End: 2024-03-02

## 2024-03-02 RX ADMIN — Medication 0.24 MCG/KG/MIN: at 18:58

## 2024-03-02 RX ADMIN — SODIUM BICARBONATE 150 MEQ: 84 INJECTION, SOLUTION INTRAVENOUS at 21:41

## 2024-03-02 RX ADMIN — CALCIUM GLUCONATE 2000 MG: 20 INJECTION, SOLUTION INTRAVENOUS at 13:32

## 2024-03-02 RX ADMIN — MIDODRINE HYDROCHLORIDE 10 MG: 5 TABLET ORAL at 18:13

## 2024-03-02 RX ADMIN — SODIUM CHLORIDE, POTASSIUM CHLORIDE, SODIUM LACTATE AND CALCIUM CHLORIDE 1000 ML: 600; 310; 30; 20 INJECTION, SOLUTION INTRAVENOUS at 04:18

## 2024-03-02 RX ADMIN — MIDODRINE HYDROCHLORIDE 10 MG: 5 TABLET ORAL at 13:33

## 2024-03-02 RX ADMIN — SODIUM BICARBONATE 100 MEQ: 84 INJECTION INTRAVENOUS at 08:22

## 2024-03-02 RX ADMIN — ALBUMIN (HUMAN) 25 G: 0.25 INJECTION, SOLUTION INTRAVENOUS at 21:01

## 2024-03-02 RX ADMIN — Medication 0.3 MCG/KG/MIN: at 04:47

## 2024-03-02 RX ADMIN — MAGNESIUM SULFATE HEPTAHYDRATE 4 G: 4 INJECTION, SOLUTION INTRAVENOUS at 08:22

## 2024-03-02 RX ADMIN — SODIUM CHLORIDE 150 ML/HR: 9 INJECTION, SOLUTION INTRAVENOUS at 03:38

## 2024-03-02 RX ADMIN — VASOPRESSIN 0.03 UNITS/MIN: 20 INJECTION INTRAVENOUS at 00:43

## 2024-03-02 RX ADMIN — HYDROCORTISONE SODIUM SUCCINATE 100 MG: 100 INJECTION, POWDER, FOR SOLUTION INTRAMUSCULAR; INTRAVENOUS at 18:13

## 2024-03-02 RX ADMIN — VANCOMYCIN HYDROCHLORIDE 1000 MG: 1 INJECTION, SOLUTION INTRAVENOUS at 06:36

## 2024-03-02 RX ADMIN — ALBUMIN (HUMAN) 25 G: 0.25 INJECTION, SOLUTION INTRAVENOUS at 08:22

## 2024-03-02 RX ADMIN — SODIUM BICARBONATE 150 MEQ: 84 INJECTION, SOLUTION INTRAVENOUS at 13:33

## 2024-03-02 RX ADMIN — Medication 10 ML: at 08:44

## 2024-03-02 RX ADMIN — PHENYLEPHRINE HYDROCHLORIDE 0.5 MCG/KG/MIN: 50 INJECTION INTRAVENOUS at 03:50

## 2024-03-02 RX ADMIN — HYDROCORTISONE SODIUM SUCCINATE 50 MG: 100 INJECTION, POWDER, FOR SOLUTION INTRAMUSCULAR; INTRAVENOUS at 02:22

## 2024-03-02 RX ADMIN — HYDROCORTISONE SODIUM SUCCINATE 100 MG: 100 INJECTION, POWDER, FOR SOLUTION INTRAMUSCULAR; INTRAVENOUS at 10:12

## 2024-03-02 RX ADMIN — Medication 0.3 MCG/KG/MIN: at 11:32

## 2024-03-02 RX ADMIN — PIPERACILLIN SODIUM AND TAZOBACTAM SODIUM 4.5 G: 4; .5 INJECTION, SOLUTION INTRAVENOUS at 22:08

## 2024-03-02 RX ADMIN — Medication 0.3 MCG/KG/MIN: at 13:33

## 2024-03-02 RX ADMIN — VASOPRESSIN 0.03 UNITS/MIN: 20 INJECTION INTRAVENOUS at 11:05

## 2024-03-02 RX ADMIN — SODIUM CHLORIDE, POTASSIUM CHLORIDE, SODIUM LACTATE AND CALCIUM CHLORIDE 150 ML/HR: 600; 310; 30; 20 INJECTION, SOLUTION INTRAVENOUS at 05:14

## 2024-03-02 RX ADMIN — PIPERACILLIN SODIUM AND TAZOBACTAM SODIUM 4.5 G: 4; .5 INJECTION, SOLUTION INTRAVENOUS at 14:02

## 2024-03-02 RX ADMIN — Medication 10 ML: at 21:05

## 2024-03-02 RX ADMIN — ALBUMIN (HUMAN) 25 G: 0.25 INJECTION, SOLUTION INTRAVENOUS at 13:33

## 2024-03-02 NOTE — CONSULTS
Referring Provider: Shin Orr MD      Subjective   History of present illness: This very nice 39-year-old with a history of congenital biliary atresia status post surgery as a .  She has cirrhosis related to this with esophageal varices, portal hypertension and splenomegaly.  She tells me that she has been having intermittent fevers since early 2024.  These would last 24 to 48 hours and then resolved.  She says this occurred at least 5 times in the past month.  She tested positive for strep and received a 10-day course of amoxicillin in early February but denies any symptoms of strep throat such as sore throat or swollen glands at that time.  Between her episodes of fever she would feel just fine.    She was seen at ER [facility] yesterday for abdominal pain with nausea vomiting and darkening urine.  She denies any dysuria.  She was found to be hypotensive and acute kidney injury and started on IV antibiotics and pressors.  She was admitted to the ICU and is on multiple pressors and has a lactate of 4.  Despite that she is remarkably lucid and says her main complaint is that she feels puffy from all the IV fluid.    Past Medical History:   Diagnosis Date    Congenital biliary atresia     History of esophageal varices     HTN (hypertension)     Liver disease     Portal hypertension        Past Surgical History:   Procedure Laterality Date     SECTION N/A     CHOLECYSTECTOMY      ENDOSCOPY N/A 2016    normal    ENDOSCOPY N/A 2018    Z-line irregular, at the gastroesophageal junction, small hiatal hernia, erythematous mucosa in the antrum    ENDOSCOPY N/A 2020    Procedure: ESOPHAGOGASTRODUODENOSCOPY WITH BIOPSIES;  Surgeon: Cesar Castellanos MD;  Location: [facility] ENDOSCOPY;  Service: Gastroenterology;  Laterality: N/A;  PRE- HX PORTAL HYPERTENSION   POST- GASTRIC POLYP      PORTOENTEROSTOMY KASAI PROCEDURE      TUBAL ABDOMINAL LIGATION N/A     UPPER  GASTROINTESTINAL ENDOSCOPY             Allergies   Allergen Reactions    Aspirin Other (See Comments)     AVOIDS DUE TO LIVER HISTORY       Medication:  Antibiotics:  Anti-Infectives (From admission, onward)      Ordered     Dose/Rate Route Frequency Start Stop    03/01/24 2239  meropenem (MERREM) 1,000 mg in sodium chloride 0.9 % 100 mL IVPB-VTB        Ordering Provider: Dayton Valadez MD    1,000 mg  over 3 Hours Intravenous Every 12 Hours 03/02/24 1000 03/09/24 0959    03/01/24 2204  Vancomycin Pharmacy Intermittent/Pulse Dosing        Ordering Provider: Dayton Valadez MD     Does not apply Daily 03/02/24 0900 03/09/24 0859    03/02/24 0452  vancomycin (VANCOCIN) 1000 mg/200 mL dextrose 5% IVPB        Ordering Provider: Walter Licona MD    1,000 mg  over 60 Minutes Intravenous Once 03/02/24 0630 03/02/24 0736    03/01/24 2239  meropenem (MERREM) 1,000 mg in sodium chloride 0.9 % 100 mL IVPB-VTB        Ordering Provider: Dayton Valadez MD    1,000 mg  over 30 Minutes Intravenous Once 03/01/24 2330 03/01/24 2352    03/01/24 2156  Pharmacy to dose vancomycin        Ordering Provider: Dayton Valadez MD     Does not apply Continuous PRN 03/01/24 2156 03/08/24 2155    03/01/24 1648  vancomycin (VANCOCIN) 1,000 mg in sodium chloride 0.9 % 250 mL IVPB-VTB        Ordering Provider: Jagdish Irby MD    15 mg/kg × 69.9 kg  250 mL/hr over 60 Minutes Intravenous Once 03/01/24 1715 03/01/24 1839    03/01/24 1648  piperacillin-tazobactam (ZOSYN) IVPB 4.5 g IVPB in 100 mL NS (VTB)        Ordering Provider: Jagdish Irby MD    4.5 g Intravenous Once 03/01/24 1715 03/01/24 1734                Physical Exam:   Vital Signs   Temp:  [98.3 °F (36.8 °C)-103.2 °F (39.6 °C)] 99.2 °F (37.3 °C)  Heart Rate:  [] 107  Resp:  [16-28] 20  BP: ()/() 114/51    GENERAL: Awake and alert, ill-appearing  HEENT: Oropharynx is clear. Hearing is grossly normal.   EYES: . No conjunctival injection. No  lid lag.   LUNGS:normal respiratory effort.   SKIN: no cutaneous eruptions in exposed areas.  Jaundice  PSYCHIATRIC: Appropriate mood, affect, insight, and judgment.     Results Review:  TB 6, alt 114, ast 83  Cr 2.57  WBC 6.9, hgb 10, plt 117  Lactate 4.6  PCT 15.2  UA 6-10 wbc/rbc, 3-6 sq epith    3/1 Bcx P  3/1 strep A pcr neg  3/1 Ucx p  3/1 MRSA PCR neg  3/1 RPP neg  3/1 Ucx P      CT abdomen pelvis with cirrhosis and portal hypertension and steatosis  Chest x-ray independently interpreted: No acute infiltrate      A/p  1.  Septic shock  2.  Biliary atresia  3.  Cirrhosis with portal hypertension  4.  Acute kidney injury  5.  Lactic acid acidosis    The etiology of her sepsis is likely biliary given the worsening LFTs, negative chest x-ray and known history of cirrhosis with atresia.  No clear evidence of intrahepatic ductal dilatation to suggest ongoing obstruction.    I am going to place her on Zosyn.  MRSA was negative and no history of resistant organisms.    We will follow her blood cultures to make sure she is not bacteremic.  Fortunately her LFTs do seem slightly better today      Thank you for this consult.  We will continue to follow along and tailor antibiotics as the patient's clinical course evolves.

## 2024-03-02 NOTE — ED NOTES
Report given to EMS at bedside. Patient resting comfortably at time of transport. Patient oriented x4.

## 2024-03-02 NOTE — ED NOTES
This RN notified Jasen SANCHEZ of patient's BP 83/37. Orders received to start norepinephrine. Patient resting comfortably, NAD noted.

## 2024-03-02 NOTE — FSED PROVIDER NOTE
Received signout from my colleague Dr. Irby at shift change.    39-year-old female with document a diagnosis of SERGE, congenital cirrhosis, septic shock, UTI on broad-spectrum antibiotics vancomycin and Zosyn received 30 cc/kg fluid resuscitation.    Patient finished her last bolus and bedside nurse informing me that systolic blood pressure in the 80s now, MAP in the 50s.  I will start Levophed.    1927 on evaluation patient resting very comfortably, no acute distress, appears to be in good spirits despite diagnosis.  Heart rate 125, systolic blood pressure in the 80s.  Additional peripheral IV access obtained to provide Levophed.    Patient already accepted at receiving facility and awaiting transport.

## 2024-03-02 NOTE — H&P
Gastroenterology   Initial Inpatient Consult Note    Referring Provider: Walter Licona    Reason for Consultation: sepsis, biliary atresia, cirrhosis    Subjective   Who presents to the hospital with abdominal pain and sepsis.  History of present illness:    39 y.o. female history of congenital biliary atresia esophageal varices cirrhosis portal hypertension who is a patient of Dr. Castellanos.  She came to the emergency room with fevers anorexia and vomiting for about 24 hours prior she has recently been seen in the gastro clinic and was endorsing abdominal pain in the epigastric area associated with nausea and vomiting.  She has had strep throat recently.  She has completed the antibiotic therapy.  Still complaining of abdominal pain in the outpatient clinic with plans for scheduling CT scan of the abdomen pelvis and trying to move up her endoscopy with Dr. Castellanos.    She presented to outside emergency room with worsening abdominal pain nausea CT scan of the abdomen pelvis showed evidence of cirrhosis and portal hypertension minimal ascites no obvious source  CT Abdomen Pelvis Without Contrast (03/01/2024 17:03)   She has been started on broad-spectrum antibiotics as well as vasopressors and is being monitored in the ICU  Initial lactate was 6.1 it has decreased to 4.5 and then 3.5  White count 6.93  Hemoglobin initially 11.5 now 10.1    LFTs 4 weeks ago were normal other than an ALT of 35.  Yesterday they were   alk phos 321 total bilirubin 6.5 this morning at 4 AM they were ALT decreased to 114 AST decreased to 83 alk phos decreased to 159 and total bilirubin 6.0    Sodium 133 creatinine 2.65 magnesium 1.2 calcium 6.2 potassium 4.7  Acetaminophen level negative salicylate negative    Patient is feeling a little bit better.  The severe abdominal pain that she was having for the last month has been improving.  She feels overall very fatigued she is still on 3 pressors  Past Medical History:  Past Medical  History:   Diagnosis Date    Congenital biliary atresia     History of esophageal varices     HTN (hypertension)     Liver disease     Portal hypertension      Past Surgical History:  Past Surgical History:   Procedure Laterality Date     SECTION N/A     CHOLECYSTECTOMY      ENDOSCOPY N/A 2016    normal    ENDOSCOPY N/A 2018    Z-line irregular, at the gastroesophageal junction, small hiatal hernia, erythematous mucosa in the antrum    ENDOSCOPY N/A 2020    Procedure: ESOPHAGOGASTRODUODENOSCOPY WITH BIOPSIES;  Surgeon: Cesar Castellanos MD;  Location: [facility] ENDOSCOPY;  Service: Gastroenterology;  Laterality: N/A;  PRE- HX PORTAL HYPERTENSION   POST- GASTRIC POLYP      PORTOENTEROSTOMY KASAI PROCEDURE      TUBAL ABDOMINAL LIGATION N/A     UPPER GASTROINTESTINAL ENDOSCOPY        Social History:   Social History     Tobacco Use    Smoking status: Never    Smokeless tobacco: Never   Substance Use Topics    Alcohol use: No      Family History:  Family History   Problem Relation Age of Onset    Heart disease Mother     Malig Hyperthermia Neg Hx        Home Meds:  Medications Prior to Admission   Medication Sig Dispense Refill Last Dose    nadolol (CORGARD) 20 MG tablet TAKE 1 TABLET BY MOUTH EVERY DAY 90 tablet 1 3/1/2024    multivitamin with minerals tablet tablet Take 1 tablet by mouth Daily.        Current Meds:   hydrocortisone sodium succinate, 50 mg, Intravenous, Q8H  meropenem, 1,000 mg, Intravenous, Q12H  senna-docusate sodium, 2 tablet, Oral, BID  sodium chloride, 10 mL, Intravenous, Q12H  vancomycin, 1,000 mg, Intravenous, Once  Vancomycin Pharmacy Intermittent/Pulse Dosing, , Does not apply, Daily      Allergies:  Allergies   Allergen Reactions    Aspirin Other (See Comments)     AVOIDS DUE TO LIVER HISTORY     Review of Systems  Pertinent items are noted in HPI, all other systems reviewed and negative    Objective     Vital Signs  Temp:  [99 °F (37.2 °C)-103.2 °F (39.6 °C)]  100 °F (37.8 °C)  Heart Rate:  [105-132] 109  Resp:  [16-28] 22  BP: ()/() 123/103    Physical Exam:  CONSTITUTIONAL:  today's vital signs reviewed  EARS NOSE THROAT: trachea midline and no deformity of the nares  EYES: , mildly jaundiced  GASTROINTESTINAL: abdomen is soft, mildly tender to palpation, nondistended with normal active bowel sounds, no masses are appreciated  PSYCHIATRIC: appropriate mood and affect  RESPIRATORY: normal inspiratory effort with no increased work of breathing  NEUROLOGIC: patient is awake and alert  DERMATOLOGIC: skin is warm with no cyanosis  LYMPHATIC: no periumbilical lymphadenopathy     Results Review:              I reviewed the patient's new clinical results.    Results from last 7 days   Lab Units 03/02/24  0403 03/01/24  1612   WBC 10*3/mm3 6.93 5.86   HEMOGLOBIN g/dL 10.1* 11.5*   HEMATOCRIT % 30.3* 35.7   PLATELETS 10*3/mm3 117* 122*     Results from last 7 days   Lab Units 03/02/24  0403 03/01/24  1612   SODIUM mmol/L 133* 131*   POTASSIUM mmol/L 4.7 3.3*   CHLORIDE mmol/L 104 99   CO2 mmol/L 14.0* 18.9*   BUN mg/dL 29* 21*   CREATININE mg/dL 2.65* 2.89*   CALCIUM mg/dL 6.2* 8.8   BILIRUBIN mg/dL 6.0* 6.5*   ALK PHOS U/L 159* 321*   ALT (SGPT) U/L 114* 154*   AST (SGOT) U/L 83* 153*   GLUCOSE mg/dL 84 115*     Results from last 7 days   Lab Units 03/02/24  0403   INR  2.05*     Lab Results   Lab Value Date/Time    LIPASE 29 03/01/2024 1612    LIPASE 49 02/03/2024 0642       Radiology:  CT Abdomen Pelvis Without Contrast   Final Result       1. Cirrhosis with portal hypertension (splenomegaly, portosystemic   collateral vessels and small amount of ascites).   2. Steatosis seen in the left hepatic lobe.       This report was finalized on 3/1/2024 5:17 PM by Dr. Dave Dixon M.D   on Workstation: [id number]          XR Chest 1 View   Final Result          Assessment & Plan   Active Hospital Problems    Diagnosis     **Sepsis        Assessment:  Septic shock  source being worked up.  Patient with known biliary atresia making biliary source concerning and on differential.  Also with recent pharyngitis and strep throat.  Also with urinalysis positive for bacteria and white cells and red cells.  I have discussed the case with the ICU service nursing staff and Dr. Chow who is our therapeutic endoscopist.  She does have underlying cirrhosis there is no obvious intrahepatic ductal dilatation.  She has had procedure before and unable to tell from the anatomy if an endoscope would actually be able to achieve intubation.  Her LFTs are starting to improve and there is no obvious ductal dilatation.  Biliary atresia  Cirrhosis  Portal hypertension with splenomegaly cirrhosis and history of esophageal varices  Abdominal pain  Ascites, small amount radiographically  Evaded lactate  Elevated LFTs improving  Acute renal insufficiency    Plan:  IV fluids  Pressor support as needed  Broad-spectrum antibiotics  ID consultation in progress  Trend LFTs  Follow-up blood cultures  Fractionate alk phos  MRCP when stable to have it done for delineation of the anatomy  Dr. Chow and I have spoken about the patient he is aware of the patient and her anatomy and presentation      I discussed the patients findings and my recommendations with patient, nursing staff, and primary care team.           Roger Urbina M.D.  [facility]  [address] [id number]  Office: [phone]

## 2024-03-02 NOTE — PLAN OF CARE
Goal Outcome Evaluation:  Plan of Care Reviewed With: patient, spouse        Progress: declining  Outcome Evaluation: Pt admitted overnight. Has required more pressor support overnight, currently maxed on levo, on vaso, and on natalya, titrating as needed. On 2L NC. Grossman in place, low UOP. Potassium replaced overnight. Nephrology consulted. GI consulted. CVC placement attempted but could not advance, hematoma formed during process, remains the same at this time; FFP ordered for next attempt.

## 2024-03-02 NOTE — PROGRESS NOTES
LPC INPATIENT PROGRESS NOTE         [facility] CARDIAC INTENSIVE CARE    3/2/2024      PATIENT IDENTIFICATION:  Name: Chantelle Vásquez ADMIT: 3/1/2024   : 1985  PCP: Shin Orr MD    MRN: [id number] LOS: 1 days   AGE/SEX: 39 y.o. female  ROOM: [facility] [place]                     LOS 1    Reason for visit: Septic shock      SUBJECTIVE:      Alert and oriented.  No distress.  She is on 3 pressors.  Increasing stress dose steroids to 100 IV every 8.  Discussed with Dr. Urbina.     Objective   OBJECTIVE:    Vital Sign Min/Max for last 24 hours  Temp  Min: 98.3 °F (36.8 °C)  Max: 103.2 °F (39.6 °C)   BP  Min: 72/61  Max: 141/106   Pulse  Min: 89  Max: 132   Resp  Min: 16  Max: 28   SpO2  Min: 87 %  Max: 100 %   No data recorded   Weight  Min: 69.9 kg (154 lb)  Max: 71.2 kg (156 lb 15.5 oz)    Vitals:    24 0930 24 0945 24 0950 24 1000   BP: 117/65 104/55 105/55 117/70   BP Location:       Patient Position:       Pulse: 105 111 103 103   Resp:   22    Temp:   98.8 °F (37.1 °C)    TempSrc:   Oral    SpO2: 100% 100% 100% 100%   Weight:       Height:                24  1552 24  2109   Weight: 69.9 kg (154 lb) 71.2 kg (156 lb 15.5 oz)       Body mass index is 23.87 kg/m².                          Body mass index is 23.87 kg/m².    Intake/Output Summary (Last 24 hours) at 3/2/2024 1017  Last data filed at 3/2/2024 1013  Gross per 24 hour   Intake 22028.9 ml   Output 517 ml   Net 53241.9 ml         Exam:  GEN:  No distress, appears stated age  EYES:   Icteric sclera, PERRL  ENT:    External ears/nose normal, OP clear.  Dry mucous membrane  NECK:  No adenopathy, midline trachea  LUNGS: Normal chest on inspection, palpation and auscultation  CV:  Normal S1S2, without murmur  ABD:  Nontender, nondistended, no hepatosplenomegaly, +BS  EXT:  Nonpitting edema.  No cyanosis or clubbing.  No mottling and normal cap refill.    Assessment     Scheduled meds:  albumin human, 25  g, Intravenous, Q6H  hydrocortisone sodium succinate, 100 mg, Intravenous, Q8H  meropenem, 1,000 mg, Intravenous, Q12H  senna-docusate sodium, 2 tablet, Oral, BID  sodium chloride, 10 mL, Intravenous, Q12H  Vancomycin Pharmacy Intermittent/Pulse Dosing, , Does not apply, Daily      IV meds:                      lactated ringers, 150 mL/hr, Last Rate: 150 mL/hr (03/02/24 0514)  Norepinephrine-Sodium Chloride, 0.02-0.3 mcg/kg/min, Last Rate: 0.3 mcg/kg/min (03/02/24 0447)  Pharmacy to dose vancomycin,   phenylephrine, 0.5-3 mcg/kg/min, Last Rate: 0.5 mcg/kg/min (03/02/24 1016)  vasopressin, 0.03 Units/min, Last Rate: 0.03 Units/min (03/02/24 0043)      Data Review:  Results from last 7 days   Lab Units 03/02/24  0403 03/01/24  1612   SODIUM mmol/L 133* 131*   POTASSIUM mmol/L 4.7 3.3*   CHLORIDE mmol/L 104 99   CO2 mmol/L 14.0* 18.9*   BUN mg/dL 29* 21*   CREATININE mg/dL 2.65* 2.89*   GLUCOSE mg/dL 84 115*   CALCIUM mg/dL 6.2* 8.8         Estimated Creatinine Clearance: 32 mL/min (A) (by C-G formula based on SCr of 2.65 mg/dL (H)).  Results from last 7 days   Lab Units 03/02/24  0403 03/01/24  1612   WBC 10*3/mm3 6.93 5.86   HEMOGLOBIN g/dL 10.1* 11.5*   PLATELETS 10*3/mm3 117* 122*     Results from last 7 days   Lab Units 03/02/24  0403   INR  2.05*     Results from last 7 days   Lab Units 03/02/24  0403 03/01/24  1612   ALT (SGPT) U/L 114* 154*   AST (SGOT) U/L 83* 153*         Results from last 7 days   Lab Units 03/02/24  0602 03/02/24  0042 03/01/24  2117 03/01/24  1916 03/01/24  1612   PROCALCITONIN ng/mL  --  15.20*  --   --   --    LACTATE mmol/L 4.6* 3.5* 4.5* 4.4* 6.1*         Glucose   Date/Time Value Ref Range Status   03/02/2024 0754 91 70 - 130 mg/dL Final   03/01/2024 2106 73 70 - 130 mg/dL Final         Imaging reviewed  Chest x-ray 3/1 reviewed    CT abdomen pelvis without contrast reviewed        Microbiology reviewed            Active Hospital Problems    Diagnosis  POA    **Sepsis [A41.9]  Yes       Resolved Hospital Problems   No resolved problems to display.         ASSESSMENT:  Septic shock  History of biliary atresia  Hyperbilirubinemia  Transaminitis  UTI  Lactic acidosis  SERGE  Met acidosis  Thrombocytopenia  Anemia  Hyponatremia  Hypokalemia  Nausea vomiting emesis  Anorexia  Coagulopathy      PLAN:  Given additional fluid challenge for persistent hypotension.  Appears intravascularly dry and third spacing related to sepsis.  Continue broad-spectrum antibiotics.  Stress dose steroids and wean pressors as able.  Correcting acidosis.  Discussed with Dr. Olmedo with nephrology.  Depending on repeat labs may consider bicarb fluids.  GI and infectious disease to evaluate.  Discussed with Dr. Urbina.  Plan for CT of the abdomen with contrast when able to tolerate.  Recheck lactic acid level early afternoon after further IV fluid resuscitation.  Getting FFP prior to considering attempting central line again.  Had complications with hematoma noted with earlier attempt.        CCT: 38 min    Pavan Merino MD  Pulmonary and Critical Care Medicine  [facility], [facility]  3/2/2024    10:17 EST

## 2024-03-02 NOTE — PROCEDURES
Ultrasound Guided Central Venous Catheter Insertion Procedure Note      Indications:  vascular access    Procedure Details   Informed consent was obtained for the procedure, including sedation.  Risks of lung perforation, hemorrhage, arrhythmia, and adverse drug reaction were discussed.     Maximum sterile technique was used including usual patient drapes, antiseptics and physician sterile garments.    Under sterile conditions the skin above the on the right internal jugular vein was prepped with chlorhexidine and covered with a sterile drape. A sterile ultrasound probe was used to localize the target vein. It was clearly visualized. Local anesthesia was applied to the skin and subcutaneous tissues with lidocaine 1%. An 18-gauge needle was then inserted into the vein under ultrasound guidance. A guide wire was then threaded into the vein. Vein was difficult to dilate with the dilator and patient's neck began showing evidence of hematoma formation. Patient denied pain, no decline in oxygenation, no change in vital signs, no crepitus noted at site.   Procedure was aborted due to concern for hematoma formation.        Winsome Martin, APRN  3/2/2024

## 2024-03-02 NOTE — PROGRESS NOTES
"[facility] Clinical Pharmacy Services: Vancomycin Pharmacokinetic Initial Consult Note    Chantelle Vásquez is a 39 y.o. female who is on day 1 of pharmacy to dose vancomycin.    Indication: Sepsis  Consulting Provider: Dr. Valadez  Planned Duration of Therapy: 7 days  Loading Dose Ordered or Given: no loading dose given  MRSA PCR performed: no  Culture/Source:   3/ blood cx x2 pending    Target: Dose by Levels  Other Antimicrobials: zosyn    Vitals/Labs  Ht: 172.7 cm (68\"); Wt: 71.2 kg (156 lb 15.5 oz)  Temp Readings from Last 1 Encounters:   03/01/24 (!) 103.2 °F (39.6 °C) (Oral)    Estimated Creatinine Clearance: 29.4 mL/min (A) (by C-G formula based on SCr of 2.89 mg/dL (H)).        Results from last 7 days   Lab Units 03/01/24  1612   CREATININE mg/dL 2.89*   WBC 10*3/mm3 5.86     Assessment/Plan:    Vancomycin Dose:   1000 mg x 1 dose  Vanc Random has been ordered for 3/2 at 0400     Pharmacy will follow patient's kidney function and will adjust doses and obtain levels as necessary. Thank you for involving pharmacy in this patient's care. Please contact pharmacy with any questions or concerns.                           Misti Armendariz, PharmD  Clinical Pharmacist    "

## 2024-03-02 NOTE — PLAN OF CARE
Goal Outcome Evaluation:     Patient remains in CICU. Sodium bicarb gtt initiated. Lactic trending down. Remains on one vasopressor but weaning down.  1 unit FFP administered for CVL placement. Urine output improving. Awaiting evening labs. Continuing to monitor.

## 2024-03-02 NOTE — H&P
.     Admission Note    Patient Identification:  Chantelle Vásquez  39 y.o.  female  1985  [id number]            CC: Septic shock fevers vomiting    History of Present Illness:  Patient is a 39-year-old with a previous medical history of congenital biliary atresia esophageal varices cirrhosis portal hypertension and hypertension who follows with Dr. Castellanos.  She presented to the emergency room with fevers anorexia vomiting that started about 24 hours prior to her presentation has been unable to keep down fluid or drink over the past day or so.  She also has had some high fevers and chills.  She did have a child with flu over the past week or 2.  Also everyone in the family had strep a pharyngitis recently as well.    She has been having some intense abdominal pain over the preceding month or so.  However not at present time.  She was seen in GI clinic yesterday and plans for CT scan and moving up endoscopy were made.    She was evaluated in the ER [facility] and admission was requested secondary to sepsis.  She was given IV fluids antibiotics UA was concerning for infection and CT abdomen pelvis showed no obvious source of infection.  She was started on norepinephrine and transferred to our facility.  Check she says she feels little better however she still feels that her mouth is dry.  No abdominal pain at present time she is awake alert and conversant.    She did have intense midepigastric pain that narcotic/morphine did not help however a GI cocktail did help.    Past Medical History:   Diagnosis Date    Congenital biliary atresia     History of esophageal varices     HTN (hypertension)     Liver disease     Portal hypertension        Past Surgical History:   Procedure Laterality Date     SECTION N/A     CHOLECYSTECTOMY      ENDOSCOPY N/A 2016    normal    ENDOSCOPY N/A 2018    Z-line irregular, at the gastroesophageal junction, small hiatal hernia, erythematous mucosa in the antrum     ENDOSCOPY N/A 12/21/2020    Procedure: ESOPHAGOGASTRODUODENOSCOPY WITH BIOPSIES;  Surgeon: Cesar Castellanos MD;  Location: [facility] ENDOSCOPY;  Service: Gastroenterology;  Laterality: N/A;  PRE- HX PORTAL HYPERTENSION   POST- GASTRIC POLYP      PORTOENTEROSTOMY KASAI PROCEDURE      TUBAL ABDOMINAL LIGATION N/A     UPPER GASTROINTESTINAL ENDOSCOPY          Social History     Socioeconomic History    Marital status:    Tobacco Use    Smoking status: Never    Smokeless tobacco: Never   Vaping Use    Vaping Use: Never used   Substance and Sexual Activity    Alcohol use: No    Drug use: No    Sexual activity: Yes     Partners: Male       Family History   Problem Relation Age of Onset    Heart disease Mother     Malig Hyperthermia Neg Hx        Medications Prior to Admission   Medication Sig Dispense Refill Last Dose    multivitamin with minerals tablet tablet Take 1 tablet by mouth Daily.       nadolol (CORGARD) 20 MG tablet TAKE 1 TABLET BY MOUTH EVERY DAY 90 tablet 1        Allergies   Allergen Reactions    Aspirin Other (See Comments)     AVOIDS DUE TO LIVER HISTORY       Review of Systems:  As per HPI    Physical Exam:  Vitals:  Vitals:    03/01/24 2140 03/01/24 2145 03/01/24 2200 03/01/24 2215   BP:  90/51 (!) 88/46 (!) 84/46   BP Location:       Patient Position:       Pulse: (!) 126 (!) 127 (!) 126 (!) 128   Resp:       Temp:       TempSrc:       SpO2: (!) 87% 90% 96% 96%   Weight:       Height:               Body mass index is 23.87 kg/m².    Intake/Output Summary (Last 24 hours) at 3/1/2024 2304  Last data filed at 3/1/2024 1916  Gross per 24 hour   Intake 2667 ml   Output --   Net 2667 ml   Norepinephrine    Exam:  General appearance: Ill nontoxic and conversant  Eyes: Mild icteric sclerae, dry conjunctivae; no lid lag;   HENT: Atraumatic; oropharynx clear with dry mucous membranes and no mucosal ulcerations; normal hard and soft palate  Neck: Trachea midline; supple no JVD  Lungs: CTA, with normal  respiratory effort and no intercostal retractions  CV: Tachycardia regular no rub  Abdomen: Soft, nontender; no masses or HSM nonrigid  Extremities: No peripheral edema or extremity lymphadenopathy  Skin warm well-perfused  Psych: Appropriate affect, alert   Neuro cranials 2 through gross intact speech intact moves all extremities       Scheduled meds:  meropenem, 1,000 mg, Intravenous, Once  [START ON 3/2/2024] meropenem, 1,000 mg, Intravenous, Q12H  potassium chloride, 40 mEq, Oral, Once  senna-docusate sodium, 2 tablet, Oral, BID  sodium chloride, 2,000 mL, Intravenous, Once  sodium chloride, 10 mL, Intravenous, Q12H  [START ON 3/2/2024] Vancomycin Pharmacy Intermittent/Pulse Dosing, , Does not apply, Daily        Data Review:   I reviewed the patient's medications and new clinical results.  Lab Results   Component Value Date    CALCIUM 8.8 03/01/2024     Results from last 7 days   Lab Units 03/01/24  1612   AST (SGOT) U/L 153*   ALT (SGPT) U/L 154*   SODIUM mmol/L 131*   POTASSIUM mmol/L 3.3*   CHLORIDE mmol/L 99   CO2 mmol/L 18.9*   BUN mg/dL 21*   CREATININE mg/dL 2.89*   GLUCOSE mg/dL 115*   CALCIUM mg/dL 8.8   WBC 10*3/mm3 5.86   HEMOGLOBIN g/dL 11.5*   PLATELETS 10*3/mm3 122*             Estimated Creatinine Clearance: 29.4 mL/min (A) (by C-G formula based on SCr of 2.89 mg/dL (H)).    IMAGING:  I have reviewed all imaging studies since my last documentation.  Imaging Results (Most Recent)       Procedure Component Value Units Date/Time    XR Chest 1 View [[id number]] Collected: 03/01/24 1723     Updated: 03/01/24 1831    Narrative:      PORTABLE CHEST     HISTORY: Sepsis, fever.     COMPARISON: None.     FINDINGS: A single portable view of the chest demonstrates the heart to  be within normal limits in size. There is no evidence of infiltrate,  effusion or of congestive failure.     This report was finalized on 3/1/2024 6:28 PM by Dr. Denis Lane M.D  on Workstation: [id number]       CT Abdomen Pelvis  Without Contrast [[id number]] Collected: 03/01/24 1707     Updated: 03/01/24 1720    Narrative:      CT ABDOMEN PELVIS WO CONTRAST-     INDICATION: Sepsis     COMPARISON: None     TECHNIQUE:  Routine CT abdomen and pelvis without IV contrast. Coronal and sagittal  reformats. Radiation dose reduction techniques were utilized, including  automated exposure control and exposure modulation based on body size.     FINDINGS:      Lung bases: Subsegmental atelectasis at the bases. Trace pericardial  fluid.     ABDOMEN: Cirrhotic morphology. Hepatic steatosis seen in the left  hepatic lobe and caudate. Gallbladder is not seen. No biliary ductal  dilatation. Splenomegaly with the spleen measuring 18.4 cm in  craniocaudal dimension. No pancreatic mass or ductal dilatation seen.  Mild thickening of the left adrenal gland. No urolithiasis or  hydronephrosis. Symmetric perinephric edema.     Pelvis: Bladder is collapsed. No bladder calculus. Tampon in the vagina.  Retroverted uterus. Tubal ligation clips. No adnexal mass. Small amount  of free fluid in the cul-de-sac extending into the adnexa.     Bowel: No obstruction. Loose stool seen in the rectum. Normal appendix.     Abdominal wall: Rectus diastases. Small fat-containing umbilical hernia.  Pelvic wall scarring.     Retroperitoneum: Multiple subcentimeter abdominal retroperitoneal lymph  nodes.     Vasculature: Suspected recanalization of the periumbilical vein with  large collateral vessel seen beneath the midline abdominal wall,  extending to the pelvis and communicate with the external iliac veins,  consistent with portosystemic shunt. Suspect enlarged portal vein and  splenic vein, suboptimally evaluated without contrast. Perisplenic and  perigastric collaterals.     Osseous structures: No destructive osseous lesions.       Impression:         1. Cirrhosis with portal hypertension (splenomegaly, portosystemic  collateral vessels and small amount of ascites).  2. Steatosis  seen in the left hepatic lobe.     This report was finalized on 3/1/2024 5:17 PM by Dr. Dave Dixon M.D  on Workstation: [id number]               ASSESSMENT /   PLAN:  Septic shock  History of biliary atresia  Hyperbilirubinemia  Transaminitis  UTI  Lactic acidosis  SERGE  Met acidosis  Thrombocytopenia  Anemia  Hyponatremia  Hypokalemia  Nausea vomiting emesis  Anorexia    RVP  Procal  BCs follow  Send UC  merrem  Vancomycin  Consult to Gastro stat -? Ct abd pelvis reviewed  Consult to ID for assistance with abx mgmt and source ID  Tampon noted on ct abd pelvis this has been removed  IVFS  Fluid bolus  Lactate trending down from arrival  Cont ivfs  Follow renal function, if not improving will need neph consult in am    Replete electrolytes  NE, wean as able with ivfs   Still dry another 2liter fluid bolus.    Dw pt, RN, np      Total critical care time was 70 minutes, excluding any separately billable procedure time.    Dayton Valadez MD  [facility]  03/01/24  23:04 EST

## 2024-03-02 NOTE — PROGRESS NOTES
Pulm/CC Note    Patient requiring increasing doses of vasopressors overnight- currently on norepinephrine, vasopressin, and low dose phenylephrine.  FENa collected- showing patient with evidence of pre-renal failure.   So far, patient has had five total liters of fluid resuscitation, not including her maintenance fluids.  Despite aggressive hydration, patient still appears hypovolemic without evidence of JVD on exam, dry mucus membranes, clear lung sounds, and venous oxygen concentration 55.5 on 2L nasal cannula.    Given high requirements of vasopressors, CVL will need to be placed.  Labs this morning show creatinine 2.65, only slightly improved from initial at 2.89.  Lactic continues to improve.     Cortisol, TSH evaluate for alternate etiologies of hypotension.  Metabolic acidosis evident on VBG, however likely secondary to large volume resuscitation with saline given normal AG.   IVF maintenance switched from NS to LR.

## 2024-03-02 NOTE — PROGRESS NOTES
Ultrasound Guided Central Venous Catheter Insertion Procedure Note      Indications:  vascular access    Procedure Details   Informed consent was obtained for the procedure, including sedation.  Risks of lung perforation, hemorrhage, arrhythmia, and adverse drug reaction were discussed.     Maximum sterile technique was used including usual patient drapes, antiseptics and physician sterile garments.    Under sterile conditions the skin above the on the right internal jugular vein was prepped with chlorhexidine and covered with a sterile drape. Local anesthesia was applied to the skin and subcutaneous tissues with lidocaine 1%. Using ultrasound guidance, an 18-gauge needle was then inserted into the vein. A guide wire was then passed easily through the catheter. A triple-lumen was then inserted into the vessel over the guide wire. The catheter was sutured into place and dressed following sterile protocol with Biopatch placed.    Findings:  There were no changes to vital signs. All catheter ports were flushed with saline. Patient did tolerate procedure well.    Recommendations:  CXR ordered to verify placement.     Pavan Merino MD  3/2/2024

## 2024-03-02 NOTE — CONSULTS
[facility] Consult Note      Patient Name: Chantelle Vásquez  : 1985  MRN: [id number]  Primary Care Physician:  Shin Orr MD  Referring Physician: Shin Orr MD  Date of admission: 3/1/2024    Subjective     Reason for Consult:  SERGE     HPI:   Chantlele Vásquez is a 39 y.o. 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  Minimal diarrhea.  Recent sick contacts (child with flui, other family with strep pharyngitis).  Found to have SERGE, septic shock, UTI, and lactic acidosis.  BUN/Cr 21/2.9 with K 3.3, bicarb 19 (AG 3), lactate 4.5.  UA with large blood/protein/LE.  Severely hypotensive to 70s systolic, now escalated to 3 pressors.  Though Cr improved some to 2.6 with IVF, bicarb down to 14 (AG 15) and IVF changed NS to LR.  UOP poor 250 cc despite aggressive IVF (3L).  Central line placed overnight.  Stress dose steroids added.  On vancomycin & merrem.  CXR unremarkable.  CT abd/pelvis with cirrhotic changes, small ascites, and left hepatic lobe steatosis; no hydronephrosis.  Only on nadolol and MVI at home.  Tmax is 103.2.  GI & ID to see.  Serum alb is low 2.9.  TB is 6 and AST/ALT 83/114.  Denies dyspnea, on NC O2.  No recent nsaid use.  Denies dysuria.  Cr was normal 0.6 on 2/3/24.  Follows with Dr Castellanos.    Review of Systems:   14 point review of systems is otherwise negative except for mentioned above on HPI    Personal History     Past Medical History:   Diagnosis Date    Congenital biliary atresia     History of esophageal varices     HTN (hypertension)     Liver disease     Portal hypertension        Past Surgical History:   Procedure Laterality Date     SECTION N/A     CHOLECYSTECTOMY      ENDOSCOPY N/A 2016    normal    ENDOSCOPY N/A 2018    Z-line irregular, at the gastroesophageal junction, small hiatal hernia,  erythematous mucosa in the antrum    ENDOSCOPY N/A 12/21/2020    Procedure: ESOPHAGOGASTRODUODENOSCOPY WITH BIOPSIES;  Surgeon: Cesar Castellanos MD;  Location: [facility] ENDOSCOPY;  Service: Gastroenterology;  Laterality: N/A;  PRE- HX PORTAL HYPERTENSION   POST- GASTRIC POLYP      PORTOENTEROSTOMY KASAI PROCEDURE      TUBAL ABDOMINAL LIGATION N/A     UPPER GASTROINTESTINAL ENDOSCOPY         Family History: family history includes Heart disease in her mother.    Social History:  reports that she has never smoked. She has never used smokeless tobacco. She reports that she does not drink alcohol and does not use drugs.    Home Medications:  Prior to Admission medications    Medication Sig Start Date End Date Taking? Authorizing Provider   nadolol (CORGARD) 20 MG tablet TAKE 1 TABLET BY MOUTH EVERY DAY 9/22/23  Yes Cristal Abel APRN   multivitamin with minerals tablet tablet Take 1 tablet by mouth Daily.    Provider, MD Robinson       Allergies:  Allergies   Allergen Reactions    Aspirin Other (See Comments)     AVOIDS DUE TO LIVER HISTORY       Objective     Vitals:   Temp:  [99 °F (37.2 °C)-103.2 °F (39.6 °C)] 100 °F (37.8 °C)  Heart Rate:  [105-132] 109  Resp:  [16-28] 22  BP: ()/() 123/103  Flow (L/min):  [2] 2    Intake/Output Summary (Last 24 hours) at 3/2/2024 0659  Last data filed at 3/2/2024 0400  Gross per 24 hour   Intake 3207 ml   Output 250 ml   Net 2957 ml       Physical Exam:    General Appearance: pleasant WF comfortable on NC O2, alert  Skin: warm and dry  HEENT: mild facial edema  Neck: supple, no JVD  Lungs: CTA bilat no rales  Heart: RRR, normal S1 and S2  Abdomen: soft, nontender, nondistended  : +constantino   Extremities: no edema, cyanosis or clubbing  Neuro: normal speech and mental status     Scheduled Meds:     hydrocortisone sodium succinate, 50 mg, Intravenous, Q8H  meropenem, 1,000 mg, Intravenous, Q12H  senna-docusate sodium, 2 tablet, Oral, BID  sodium chloride, 10 mL,  Intravenous, Q12H  vancomycin, 1,000 mg, Intravenous, Once  Vancomycin Pharmacy Intermittent/Pulse Dosing, , Does not apply, Daily      IV Meds:   lactated ringers, 150 mL/hr, Last Rate: 150 mL/hr (03/02/24 0514)  Norepinephrine-Sodium Chloride, 0.02-0.3 mcg/kg/min, Last Rate: 0.3 mcg/kg/min (03/02/24 0257)  Pharmacy to dose vancomycin,   phenylephrine, 0.5-3 mcg/kg/min, Last Rate: 0.7 mcg/kg/min (03/02/24 0558)  vasopressin, 0.03 Units/min, Last Rate: 0.03 Units/min (03/02/24 0043)        Results Reviewed:   I have personally reviewed the results from the time of this admission to 3/2/2024 06:59 EST     Lab Results   Component Value Date    GLUCOSE 84 03/02/2024    CALCIUM 6.2 (L) 03/02/2024     (L) 03/02/2024    K 4.7 03/02/2024    CO2 14.0 (L) 03/02/2024     03/02/2024    BUN 29 (H) 03/02/2024    CREATININE 2.65 (H) 03/02/2024    BCR 10.9 03/02/2024    ANIONGAP 15.0 03/02/2024      Lab Results   Component Value Date    MG 1.2 (L) 03/02/2024    ALBUMIN 2.9 (L) 03/02/2024           Assessment / Plan     ASSESSMENT:  SERGE - prerenal azotemia from vol depletion with n/v and poor oral intake, vs ATN from same combination in assoc with severe septic shock.  Cr has improved slightly 2.9 to 2.6.  CT: no hydro.  UA: active sediment with poss UTI, infectious GN is a consideration.  UOP marginal, but no current indication for CRRT, will see if able to improve acidosis with measures below.  K ok 4.7.  Septic shock - UTI as source?  CXR & CT abd otherwise unremarkable.  On 3 pressors currently.  Merrem + vanc.  Febrile overnight.  ID to see.  WBC is normal.  Procal high.  BCx/UCx pending  Lactic acidosis in assoc with sepsis.  LA ~ 4.5.  Bicarb down to 14, corrected AG (for low alb) ~ 18.  NS IVF switched to LR few hours ago  Congenital biliary atresia + cirrhosis.  Small ascites on CT.  GI to see  Hyperbilirubinemia + transaminitis  Coagulopathy, INR 2  Anemia, TCP - mild, hgb 10.1, PLT 117K  HypoMg  (1.2)    PLAN:  Agree with LR IVF for now.    2 amps of sodium bicarb  Repeat BMP 9 AM, if acidosis not improving can change to bicarb drip  25% albumin boluses 25g q6h x3 to help wean pressors  Add midodrine when able to take PO meds   No immediate indication for CRRT  Replace Mg   ID & GI to see     Addendum: repeat labs noted.  Bicarb still 13.  K generous 5.  Change LR IVF to bicarb drip.  Replace Ca.  D/W RN    Thank you Dr Valadez for involving us in the care of Chantelle Vásquez.  Please feel free to call with any questions.  D/W RN    Dave Olmedo MD  03/02/24  06:59 EST    [facility]  [phone]

## 2024-03-03 LAB
ALBUMIN SERPL-MCNC: 3.3 G/DL (ref 3.5–5.2)
ALBUMIN/GLOB SERPL: 1.8 G/DL
ALP SERPL-CCNC: 116 U/L (ref 39–117)
ALT SERPL W P-5'-P-CCNC: 69 U/L (ref 1–33)
ANION GAP SERPL CALCULATED.3IONS-SCNC: 11 MMOL/L (ref 5–15)
ANION GAP SERPL CALCULATED.3IONS-SCNC: 15.6 MMOL/L (ref 5–15)
AST SERPL-CCNC: 37 U/L (ref 1–32)
BACTERIA SPEC AEROBE CULT: NO GROWTH
BH BB BLOOD EXPIRATION DATE: NORMAL
BH BB BLOOD TYPE BARCODE: 5100
BH BB DISPENSE STATUS: NORMAL
BH BB PRODUCT CODE: NORMAL
BH BB UNIT NUMBER: NORMAL
BILIRUB SERPL-MCNC: 5.5 MG/DL (ref 0–1.2)
BUN SERPL-MCNC: 33 MG/DL (ref 6–20)
BUN SERPL-MCNC: 34 MG/DL (ref 6–20)
BUN/CREAT SERPL: 16.7 (ref 7–25)
BUN/CREAT SERPL: 18.2 (ref 7–25)
CALCIUM SPEC-SCNC: 6.8 MG/DL (ref 8.6–10.5)
CALCIUM SPEC-SCNC: 7.8 MG/DL (ref 8.6–10.5)
CHLORIDE SERPL-SCNC: 101 MMOL/L (ref 98–107)
CHLORIDE SERPL-SCNC: 102 MMOL/L (ref 98–107)
CO2 SERPL-SCNC: 23.4 MMOL/L (ref 22–29)
CO2 SERPL-SCNC: 25 MMOL/L (ref 22–29)
CREAT SERPL-MCNC: 1.81 MG/DL (ref 0.57–1)
CREAT SERPL-MCNC: 2.04 MG/DL (ref 0.57–1)
D-LACTATE SERPL-SCNC: 2.2 MMOL/L (ref 0.5–2)
DEPRECATED RDW RBC AUTO: 38.1 FL (ref 37–54)
EGFRCR SERPLBLD CKD-EPI 2021: 31.3 ML/MIN/1.73
EGFRCR SERPLBLD CKD-EPI 2021: 36.1 ML/MIN/1.73
ERYTHROCYTE [DISTWIDTH] IN BLOOD BY AUTOMATED COUNT: 12.4 % (ref 12.3–15.4)
GLOBULIN UR ELPH-MCNC: 1.8 GM/DL
GLUCOSE SERPL-MCNC: 110 MG/DL (ref 65–99)
GLUCOSE SERPL-MCNC: 140 MG/DL (ref 65–99)
HCT VFR BLD AUTO: 24.2 % (ref 34–46.6)
HGB BLD-MCNC: 8.3 G/DL (ref 12–15.9)
INR PPP: 1.38 (ref 0.9–1.1)
LYMPHOCYTES # BLD MANUAL: 0.25 10*3/MM3 (ref 0.7–3.1)
LYMPHOCYTES NFR BLD MANUAL: 3 % (ref 5–12)
MAGNESIUM SERPL-MCNC: 2.5 MG/DL (ref 1.6–2.6)
MCH RBC QN AUTO: 29.4 PG (ref 26.6–33)
MCHC RBC AUTO-ENTMCNC: 34.3 G/DL (ref 31.5–35.7)
MCV RBC AUTO: 85.8 FL (ref 79–97)
MONOCYTES # BLD: 0.1 10*3/MM3 (ref 0.1–0.9)
NEUTROPHILS # BLD AUTO: 2.83 10*3/MM3 (ref 1.7–7)
NEUTROPHILS NFR BLD MANUAL: 89 % (ref 42.7–76)
PHOSPHATE SERPL-MCNC: 3 MG/DL (ref 2.5–4.5)
PLAT MORPH BLD: NORMAL
PLATELET # BLD AUTO: 54 10*3/MM3 (ref 140–450)
PMV BLD AUTO: 9.6 FL (ref 6–12)
POTASSIUM SERPL-SCNC: 3.4 MMOL/L (ref 3.5–5.2)
POTASSIUM SERPL-SCNC: 3.6 MMOL/L (ref 3.5–5.2)
PROT SERPL-MCNC: 5.1 G/DL (ref 6–8.5)
PROTHROMBIN TIME: 17.2 SECONDS (ref 11.7–14.2)
RBC # BLD AUTO: 2.82 10*6/MM3 (ref 3.77–5.28)
RBC MORPH BLD: NORMAL
SODIUM SERPL-SCNC: 138 MMOL/L (ref 136–145)
SODIUM SERPL-SCNC: 140 MMOL/L (ref 136–145)
UNIT  ABO: NORMAL
UNIT  RH: NORMAL
VARIANT LYMPHS NFR BLD MANUAL: 8 % (ref 19.6–45.3)
WBC MORPH BLD: NORMAL
WBC NRBC COR # BLD AUTO: 3.18 10*3/MM3 (ref 3.4–10.8)

## 2024-03-03 PROCEDURE — 83735 ASSAY OF MAGNESIUM: CPT

## 2024-03-03 PROCEDURE — 25010000002 HYDROCORTISONE SOD SUC (PF) 100 MG RECONSTITUTED SOLUTION: Performed by: INTERNAL MEDICINE

## 2024-03-03 PROCEDURE — 85610 PROTHROMBIN TIME: CPT

## 2024-03-03 PROCEDURE — 25010000002 PIPERACILLIN SOD-TAZOBACTAM PER 1 G: Performed by: INTERNAL MEDICINE

## 2024-03-03 PROCEDURE — 25010000002 CALCIUM GLUCONATE 2-0.675 GM/100ML-% SOLUTION: Performed by: INTERNAL MEDICINE

## 2024-03-03 PROCEDURE — 85007 BL SMEAR W/DIFF WBC COUNT: CPT

## 2024-03-03 PROCEDURE — 99232 SBSQ HOSP IP/OBS MODERATE 35: CPT | Performed by: INTERNAL MEDICINE

## 2024-03-03 PROCEDURE — 80053 COMPREHEN METABOLIC PANEL: CPT | Performed by: INTERNAL MEDICINE

## 2024-03-03 PROCEDURE — 85025 COMPLETE CBC W/AUTO DIFF WBC: CPT

## 2024-03-03 PROCEDURE — 84100 ASSAY OF PHOSPHORUS: CPT | Performed by: INTERNAL MEDICINE

## 2024-03-03 PROCEDURE — 99233 SBSQ HOSP IP/OBS HIGH 50: CPT | Performed by: INTERNAL MEDICINE

## 2024-03-03 PROCEDURE — 83605 ASSAY OF LACTIC ACID: CPT | Performed by: INTERNAL MEDICINE

## 2024-03-03 RX ORDER — CALCIUM GLUCONATE 20 MG/ML
2000 INJECTION, SOLUTION INTRAVENOUS ONCE
Status: COMPLETED | OUTPATIENT
Start: 2024-03-03 | End: 2024-03-03

## 2024-03-03 RX ORDER — MIDODRINE HYDROCHLORIDE 5 MG/1
10 TABLET ORAL
Status: DISCONTINUED | OUTPATIENT
Start: 2024-03-03 | End: 2024-03-08 | Stop reason: HOSPADM

## 2024-03-03 RX ORDER — POTASSIUM CHLORIDE 750 MG/1
40 TABLET, FILM COATED, EXTENDED RELEASE ORAL ONCE
Status: COMPLETED | OUTPATIENT
Start: 2024-03-03 | End: 2024-03-03

## 2024-03-03 RX ADMIN — MIDODRINE HYDROCHLORIDE 10 MG: 5 TABLET ORAL at 17:22

## 2024-03-03 RX ADMIN — Medication 10 ML: at 21:07

## 2024-03-03 RX ADMIN — CALCIUM GLUCONATE 2000 MG: 20 INJECTION, SOLUTION INTRAVENOUS at 07:55

## 2024-03-03 RX ADMIN — Medication 10 ML: at 08:02

## 2024-03-03 RX ADMIN — HYDROCORTISONE SODIUM SUCCINATE 100 MG: 100 INJECTION, POWDER, FOR SOLUTION INTRAMUSCULAR; INTRAVENOUS at 11:25

## 2024-03-03 RX ADMIN — PIPERACILLIN SODIUM AND TAZOBACTAM SODIUM 4.5 G: 4; .5 INJECTION, SOLUTION INTRAVENOUS at 05:30

## 2024-03-03 RX ADMIN — MIDODRINE HYDROCHLORIDE 10 MG: 5 TABLET ORAL at 06:37

## 2024-03-03 RX ADMIN — PIPERACILLIN SODIUM AND TAZOBACTAM SODIUM 4.5 G: 4; .5 INJECTION, SOLUTION INTRAVENOUS at 14:46

## 2024-03-03 RX ADMIN — PIPERACILLIN SODIUM AND TAZOBACTAM SODIUM 4.5 G: 4; .5 INJECTION, SOLUTION INTRAVENOUS at 21:48

## 2024-03-03 RX ADMIN — POTASSIUM CHLORIDE 40 MEQ: 750 TABLET, EXTENDED RELEASE ORAL at 07:55

## 2024-03-03 RX ADMIN — HYDROCORTISONE SODIUM SUCCINATE 100 MG: 100 INJECTION, POWDER, FOR SOLUTION INTRAMUSCULAR; INTRAVENOUS at 17:22

## 2024-03-03 RX ADMIN — HYDROCORTISONE SODIUM SUCCINATE 100 MG: 100 INJECTION, POWDER, FOR SOLUTION INTRAMUSCULAR; INTRAVENOUS at 01:04

## 2024-03-03 NOTE — PLAN OF CARE
Goal Outcome Evaluation:  Plan of Care Reviewed With: patient        Progress: improving  Outcome Evaluation: Pt remains in CICU overnight. VSS. Levo off. Bicarb gtt stopped. Potassium 3.4, renal paged. Good UOP. 2 BMs. No acute events overnight. Plan of care to be reevaluated today.

## 2024-03-03 NOTE — PROGRESS NOTES
[facility] Progress Note      Patient Name: Chantelle Vásquez  : 1985  MRN: [id number]  Primary Care Physician:  Shin Orr MD  Date of admission: 3/1/2024    Subjective     Interval History:   F/u SERGE    Review of Systems:   I/O 7.5/2.1   Feeling better; less dyspnea, on RA  Constantino remains  Tolerating liq diet; denies n/v  Off pressors & bicarb drip     Objective     Vitals:   Temp:  [98.1 °F (36.7 °C)-100.2 °F (37.9 °C)] 98.1 °F (36.7 °C)  Heart Rate:  [] 90  Resp:  [18-22] 18  BP: ()/(50-87) 107/72  Flow (L/min):  [2] 2    Intake/Output Summary (Last 24 hours) at 3/3/2024 0916  Last data filed at 3/3/2024 0858  Gross per 24 hour   Intake 7355.91 ml   Output 2261 ml   Net 5094.91 ml       Physical Exam:    General Appearance: young pleasant WF comfortable/alert   Neck: RIJ TLC, no JVD  Lungs: CTA bilat no rales  Heart: RRR, normal S1 and S2  Abdomen: soft, nontender, nondistended  : +constantino  Extremities: trace BLE edema, no cyanosis or clubbing  Neuro: normal speech and mental status     Scheduled Meds:     hydrocortisone sodium succinate, 100 mg, Intravenous, Q8H  midodrine, 10 mg, Oral, TID AC  piperacillin-tazobactam, 4.5 g, Intravenous, Q8H  senna-docusate sodium, 2 tablet, Oral, BID  sodium chloride, 10 mL, Intravenous, Q12H      IV Meds:   Norepinephrine-Sodium Chloride, 0.02-0.3 mcg/kg/min, Last Rate: Stopped (24 0522)  phenylephrine, 0.5-3 mcg/kg/min, Last Rate: Stopped (24 1133)  vasopressin, 0.03 Units/min, Last Rate: Stopped (24 1334)        Results Reviewed:   I have personally reviewed the results from the time of this admission to 3/3/2024 09:16 EST     Results from last 7 days   Lab Units 24  0524 24  1823 24  0946 24  0403   SODIUM mmol/L 140 137 137 133*   POTASSIUM mmol/L 3.4* 3.9 5.0 4.7   CHLORIDE mmol/L 101 104 105 104   CO2 mmol/L 23.4 17.6* 13.1* 14.0*   BUN mg/dL 34* 34* 32* 29*   CREATININE  mg/dL 2.04* 2.45* 2.57* 2.65*   CALCIUM mg/dL 6.8* 6.9* 6.4* 6.2*   BILIRUBIN mg/dL 5.5* 5.5*  --  6.0*   ALK PHOS U/L 116 129*  --  159*   ALT (SGPT) U/L 69* 81*  --  114*   AST (SGOT) U/L 37* 50*  --  83*   GLUCOSE mg/dL 140* 145* 99 84     Estimated Creatinine Clearance: 41.1 mL/min (A) (by C-G formula based on SCr of 2.04 mg/dL (H)).  Results from last 7 days   Lab Units 03/03/24  0524 03/02/24  1823 03/02/24  0403   MAGNESIUM mg/dL 2.5 2.3 1.2*   PHOSPHORUS mg/dL 3.0  --   --          Results from last 7 days   Lab Units 03/03/24  0524 03/02/24  0403 03/01/24  1612   WBC 10*3/mm3 3.18* 6.93 5.86   HEMOGLOBIN g/dL 8.3* 10.1* 11.5*   PLATELETS 10*3/mm3 54* 117* 122*     Results from last 7 days   Lab Units 03/03/24  0524 03/02/24  0403   INR  1.38* 2.05*       Assessment / Plan     ASSESSMENT:  SERGE - prerenal azotemia from vol depletion with n/v, poor oral intake, sepsis. Cr down further with IVF 2.0 today (peak 2.9).  CT: no hydro.  UA: active sediment so infectious GN is a consideration but less likely .  UOP has picked up.  5L pos/24h with mild periph edema but no dyspnea.  Tolerating liq diet.  K down to 3.4 with shift from bicarb drip  Septic shock - ID suspect biliary source.  Off pressors, SBP low 100s on midodrine 10 TID.  BCx NGTD.  WBC low 3.1.    Lactic acidosis in assoc with sepsis.  Resolving, lactate down to 2.2, bicarb up to 23 and AG down 16.  Switched LR to bicarb drip yesterday for worsening acidosis.  Congenital biliary atresia + cirrhosis.  Minimal ascites on CT.  GI following  Hyperbilirubinemia + transaminitis  Coagulopathy, INR 2 -> 1.4  Pancytopenia, hgb down 8.3 with IVF dilution; WBC down to 3.1 and PLT 54K  HypoMg, repleted     PLAN:  DCd bicarb drip  No IVF today, encourage PO fluid intake  May need diuresis tomorrow   Replace K/Ca & repeat BMP later   Wean midodrine 10 BID  Note plan for MRCP   D/W KIRSTEN Olmedo MD  03/03/24  09:16 EST    [facility]  [phone]

## 2024-03-03 NOTE — PROGRESS NOTES
ID note for septic shock  S:   Off pressors  Feels better  Tm 100.2  No sig pain    Physical Exam:   Vital Signs   Temp:  [98.1 °F (36.7 °C)-100.2 °F (37.9 °C)] 98.1 °F (36.7 °C)  Heart Rate:  [] 90  Resp:  [18-22] 18  BP: ()/(50-87) 107/72    GENERAL: Awake and alert, ill-appearing  HEENT: Oropharynx is clear. Hearing is grossly normal.   EYES: . No conjunctival injection. No lid lag.   LUNGS:normal respiratory effort.   SKIN: no cutaneous eruptions in exposed areas.  Jaundice  PSYCHIATRIC: Appropriate mood, affect, insight, and judgment.     Results Review:  TB 5.5 alt 69, ast 37  Cr 2.04  WBC 3.2  Lactate 2.2    3/1 Bcx NGTD  3/1 strep A pcr neg  3/1 Ucx p  3/1 MRSA PCR neg  3/1 RPP neg  3/1 Ucx P    Chest x-ray independently interpreted: No acute infiltrate      A/p  1.  Septic shock  2.  Biliary atresia  3.  Cirrhosis with portal hypertension  4.  Acute kidney injury  5.  Lactic acid acidosis    Suspected biliary sepsis  Some improvement in LFTs; MRCP as per gi  Cont zosyn  Bcx remain negative  Pressors off

## 2024-03-03 NOTE — PLAN OF CARE
Goal Outcome Evaluation:      Patient remains in CICU. Remains off vasopressors and VSS. Grossman removed and patient able to void. Continues to have diarrhea. Ambulated in wilkes and bedroom. Tolerating clear liquid diet. Labs improving. Continuing to monitor.

## 2024-03-03 NOTE — PROGRESS NOTES
[facility]  Inpatient Progress Note    Reason for Followup:  Sepsis    Subjective     Interval History:   No new issues overnight, lactate improving, weaning down pressors    Current Facility-Administered Medications:     acetaminophen (TYLENOL) tablet 650 mg, 650 mg, Oral, Q8H PRN **OR** acetaminophen (TYLENOL) suppository 650 mg, 650 mg, Rectal, Q8H PRN, Winsome Martin APRN    sennosides-docusate (PERICOLACE) 8.6-50 MG per tablet 2 tablet, 2 tablet, Oral, BID **AND** polyethylene glycol (MIRALAX) packet 17 g, 17 g, Oral, Daily PRN **AND** bisacodyl (DULCOLAX) EC tablet 5 mg, 5 mg, Oral, Daily PRN **AND** bisacodyl (DULCOLAX) suppository 10 mg, 10 mg, Rectal, Daily PRN, Winsome Martin APRN    Hydrocortisone Sod Suc (PF) (Solu-CORTEF) injection 100 mg, 100 mg, Intravenous, Q8H, Pavan Merino MD, 100 mg at 03/03/24 0104    midodrine (PROAMATINE) tablet 10 mg, 10 mg, Oral, TID AC, Dave Olmedo MD, 10 mg at 03/02/24 1813    nitroglycerin (NITROSTAT) SL tablet 0.4 mg, 0.4 mg, Sublingual, Q5 Min PRN, Winsome Martin APRN    norepinephrine (LEVOPHED) 8 mg in 250 mL NS infusion (premix), 0.02-0.3 mcg/kg/min, Intravenous, Titrated, Winsome Martin APRN, Stopped at 03/03/24 0522    ondansetron (ZOFRAN) injection 4 mg, 4 mg, Intravenous, Q6H PRN, Winsome Martin APRN    phenylephrine (JULIUS-SYNEPHRINE) 50 mg in 250 mL NS infusion, 0.5-3 mcg/kg/min, Intravenous, Titrated, Winsome Martin APRN, Stopped at 03/02/24 1133    piperacillin-tazobactam (ZOSYN) 4.5 g in iso-osmotic dextrose 100 mL IVPB (premix), 4.5 g, Intravenous, Q8H, Tommy Swift MD, 4.5 g at 03/03/24 0530    sodium chloride 0.9 % flush 10 mL, 10 mL, Intravenous, Q12H, Winsome Martin APRN, 10 mL at 03/02/24 2105    sodium chloride 0.9 % flush 10 mL, 10 mL, Intravenous, PRN, Winsome Martin, APRN    sodium chloride 0.9 % infusion 40 mL, 40 mL, Intravenous, PRN, Winsome Martin, APRN    vasopressin  (PITRESSIN) 20 units in 100 mL NS infusion, 0.03 Units/min, Intravenous, Continuous, Winsome Martin, ARIEL, Stopped at 03/02/24 1334    Objective     Vital Signs  Temp:  [98.2 °F (36.8 °C)-100.2 °F (37.9 °C)] 98.2 °F (36.8 °C)  Heart Rate:  [] 77  Resp:  [20-22] 20  BP: ()/(44-96) 111/75  Body mass index is 26.75 kg/m².    Intake/Output Summary (Last 24 hours) at 3/3/2024 0624  Last data filed at 3/3/2024 0607  Gross per 24 hour   Intake 29370.11 ml   Output 2238 ml   Net 94760.11 ml     I/O this shift:  In: 2323.8 [P.O.:300; I.V.:2023.8]  Out: 1150 [Urine:1150]     Physical Exam:   General: patient awake, alert and cooperative   Abdomen: soft, nontender, nondistended; normal bowel sounds     Results Review:     I reviewed the patient's new clinical results.  I reviewed the patient's new imaging results and agree with the interpretation.    Results from last 7 days   Lab Units 03/02/24  0403 03/01/24  1612   WBC 10*3/mm3 6.93 5.86   HEMOGLOBIN g/dL 10.1* 11.5*   HEMATOCRIT % 30.3* 35.7   PLATELETS 10*3/mm3 117* 122*     Results from last 7 days   Lab Units 03/03/24  0524 03/02/24  1823 03/02/24  0946 03/02/24  0403   SODIUM mmol/L 140 137 137 133*   POTASSIUM mmol/L 3.4* 3.9 5.0 4.7   CHLORIDE mmol/L 101 104 105 104   CO2 mmol/L 23.4 17.6* 13.1* 14.0*   BUN mg/dL 34* 34* 32* 29*   CREATININE mg/dL 2.04* 2.45* 2.57* 2.65*   CALCIUM mg/dL 6.8* 6.9* 6.4* 6.2*   BILIRUBIN mg/dL 5.5* 5.5*  --  6.0*   ALK PHOS U/L 116 129*  --  159*   ALT (SGPT) U/L 69* 81*  --  114*   AST (SGOT) U/L 37* 50*  --  83*   GLUCOSE mg/dL 140* 145* 99 84     Results from last 7 days   Lab Units 03/03/24  0524 03/02/24  0403   INR  1.38* 2.05*     Lab Results   Lab Value Date/Time    LIPASE 29 03/01/2024 1612    LIPASE 49 02/03/2024 0642       Radiology:  [unfilled]      Assessment & Plan   Assessment:    Septic shock   Biliary atresia   Cirrhosis  Minimal ascites    All problems new to me today    Plan:   Continue broad specturum  abx and pressor support for sepsis  Monitor LFTs, TB stable and transaminases improved today  MRCP when able to tolerate for delineation of anatomy.    I discussed the patient's findings and my recommendations with patient.          Levy Villa M.D.  [facility]  [address] [place] [address]  Office: [phone]

## 2024-03-03 NOTE — PROGRESS NOTES
[facility] INPATIENT PROGRESS NOTE         [facility] CARDIAC INTENSIVE CARE    3/3/2024      PATIENT IDENTIFICATION:  Name: Chantelle Vásquez ADMIT: 3/1/2024   : 1985  PCP: Shin Orr MD    MRN: [id number] LOS: 2 days   AGE/SEX: 39 y.o. female  ROOM: [facility]                     LOS 2    Reason for visit: Septic shock      SUBJECTIVE:      Looks much better.  Pressors off.  Discussed with nursing staff at bedside.  No other new issues overnight.    Objective   OBJECTIVE:    Vital Sign Min/Max for last 24 hours  Temp  Min: 98.1 °F (36.7 °C)  Max: 100.2 °F (37.9 °C)   BP  Min: 90/75  Max: 127/87   Pulse  Min: 77  Max: 116   Resp  Min: 18  Max: 22   SpO2  Min: 95 %  Max: 100 %   No data recorded   Weight  Min: 79.8 kg (175 lb 14.8 oz)  Max: 79.8 kg (175 lb 14.8 oz)    Vitals:    24 0803 24 0815 24 0830 24 0845   BP:   107/72    BP Location:   Left arm    Patient Position:   Lying    Pulse:  88 88 90   Resp:   18    Temp: 98.1 °F (36.7 °C)      TempSrc: Oral      SpO2:  98% 99% 98%   Weight:       Height:                24  1552 24  2109 24  0607   Weight: 69.9 kg (154 lb) 71.2 kg (156 lb 15.5 oz) 79.8 kg (175 lb 14.8 oz)       Body mass index is 26.75 kg/m².                          Body mass index is 26.75 kg/m².    Intake/Output Summary (Last 24 hours) at 3/3/2024 0938  Last data filed at 3/3/2024 0858  Gross per 24 hour   Intake 7355.91 ml   Output 2261 ml   Net 5094.91 ml         Exam:  GEN:  No distress, appears stated age  EYES:   Icteric sclera, PERRL  ENT:    External ears/nose normal, OP clear.  Dry mucous membrane  NECK:  No adenopathy, midline trachea  LUNGS: Normal chest on inspection, palpation and auscultation  CV:  Normal S1S2, without murmur  ABD:  Nontender, nondistended, no hepatosplenomegaly, +BS  EXT:  Nonpitting edema.  No cyanosis or clubbing.  No mottling and normal cap refill.    Assessment     Scheduled meds:  hydrocortisone  sodium succinate, 100 mg, Intravenous, Q8H  midodrine, 10 mg, Oral, TID AC  piperacillin-tazobactam, 4.5 g, Intravenous, Q8H  senna-docusate sodium, 2 tablet, Oral, BID  sodium chloride, 10 mL, Intravenous, Q12H      IV meds:                      Norepinephrine-Sodium Chloride, 0.02-0.3 mcg/kg/min, Last Rate: Stopped (03/03/24 0522)  phenylephrine, 0.5-3 mcg/kg/min, Last Rate: Stopped (03/02/24 1133)  vasopressin, 0.03 Units/min, Last Rate: Stopped (03/02/24 1334)      Data Review:  Results from last 7 days   Lab Units 03/03/24 0524 03/02/24 1823 03/02/24  0946 03/02/24 0403 03/01/24  1612   SODIUM mmol/L 140 137 137 133* 131*   POTASSIUM mmol/L 3.4* 3.9 5.0 4.7 3.3*   CHLORIDE mmol/L 101 104 105 104 99   CO2 mmol/L 23.4 17.6* 13.1* 14.0* 18.9*   BUN mg/dL 34* 34* 32* 29* 21*   CREATININE mg/dL 2.04* 2.45* 2.57* 2.65* 2.89*   GLUCOSE mg/dL 140* 145* 99 84 115*   CALCIUM mg/dL 6.8* 6.9* 6.4* 6.2* 8.8         Estimated Creatinine Clearance: 41.1 mL/min (A) (by C-G formula based on SCr of 2.04 mg/dL (H)).  Results from last 7 days   Lab Units 03/03/24 0524 03/02/24 0403 03/01/24  1612   WBC 10*3/mm3 3.18* 6.93 5.86   HEMOGLOBIN g/dL 8.3* 10.1* 11.5*   PLATELETS 10*3/mm3 54* 117* 122*     Results from last 7 days   Lab Units 03/03/24 0524 03/02/24  0403   INR  1.38* 2.05*     Results from last 7 days   Lab Units 03/03/24 0524 03/02/24 1823 03/02/24 0403 03/01/24  1612   ALT (SGPT) U/L 69* 81* 114* 154*   AST (SGOT) U/L 37* 50* 83* 153*         Results from last 7 days   Lab Units 03/03/24  0802 03/02/24  1421 03/02/24  0602 03/02/24  0042 03/01/24  2117 03/01/24  1916 03/01/24  1612   PROCALCITONIN ng/mL  --   --   --  15.20*  --   --  16.90*   LACTATE mmol/L 2.2* 3.1* 4.6* 3.5* 4.5* 4.4* 6.1*         Glucose   Date/Time Value Ref Range Status   03/02/2024 1823 139 (H) 70 - 130 mg/dL Final   03/02/2024 0754 91 70 - 130 mg/dL Final   03/01/2024 2106 73 70 - 130 mg/dL Final         Imaging reviewed  Chest x-ray  3/2 reviewed        CT abdomen pelvis without contrast reviewed        Microbiology reviewed            Active Hospital Problems    Diagnosis  POA    **Sepsis [A41.9]  Yes      Resolved Hospital Problems   No resolved problems to display.         ASSESSMENT:  Septic shock  History of biliary atresia  Hyperbilirubinemia  Transaminitis  UTI  Lactic acidosis  SERGE  Met acidosis  Thrombocytopenia  Anemia  Hyponatremia  Hypokalemia  Nausea vomiting emesis  Anorexia  Coagulopathy      PLAN:  Pressor requirements improved.  Continue broad-spectrum antibiotics.  Stress dose steroids.  Off bicarb fluids.  MRCP when able to tolerate.  GI following.  Lactic acidosis improving.      CCT: 32 min    Pavan Merino MD  Pulmonary and Critical Care Medicine  [facility]  3/3/2024    09:38 EST

## 2024-03-04 LAB
25(OH)D3 SERPL-MCNC: 10 NG/ML (ref 30–100)
ALBUMIN SERPL-MCNC: 3.1 G/DL (ref 3.5–5.2)
ALBUMIN/GLOB SERPL: 1.5 G/DL
ALP SERPL-CCNC: 227 U/L (ref 39–117)
ALT SERPL W P-5'-P-CCNC: 68 U/L (ref 1–33)
ANION GAP SERPL CALCULATED.3IONS-SCNC: 10.2 MMOL/L (ref 5–15)
AST SERPL-CCNC: 43 U/L (ref 1–32)
BACTERIA SPEC AEROBE CULT: NO GROWTH
BILIRUB SERPL-MCNC: 5.6 MG/DL (ref 0–1.2)
BUN SERPL-MCNC: 31 MG/DL (ref 6–20)
BUN/CREAT SERPL: 19 (ref 7–25)
CALCIUM SPEC-SCNC: 7.5 MG/DL (ref 8.6–10.5)
CHLORIDE SERPL-SCNC: 103 MMOL/L (ref 98–107)
CO2 SERPL-SCNC: 25.8 MMOL/L (ref 22–29)
CREAT SERPL-MCNC: 1.63 MG/DL (ref 0.57–1)
DEPRECATED RDW RBC AUTO: 42.2 FL (ref 37–54)
EGFRCR SERPLBLD CKD-EPI 2021: 41 ML/MIN/1.73
ERYTHROCYTE [DISTWIDTH] IN BLOOD BY AUTOMATED COUNT: 12.9 % (ref 12.3–15.4)
GLOBULIN UR ELPH-MCNC: 2.1 GM/DL
GLUCOSE BLDC GLUCOMTR-MCNC: 111 MG/DL (ref 70–130)
GLUCOSE SERPL-MCNC: 111 MG/DL (ref 65–99)
HCT VFR BLD AUTO: 26.7 % (ref 34–46.6)
HGB BLD-MCNC: 8.6 G/DL (ref 12–15.9)
INR PPP: 1.25 (ref 0.9–1.1)
LYMPHOCYTES # BLD MANUAL: 0.69 10*3/MM3 (ref 0.7–3.1)
LYMPHOCYTES NFR BLD MANUAL: 2.2 % (ref 5–12)
MAGNESIUM SERPL-MCNC: 2.7 MG/DL (ref 1.6–2.6)
MCH RBC QN AUTO: 28.8 PG (ref 26.6–33)
MCHC RBC AUTO-ENTMCNC: 32.2 G/DL (ref 31.5–35.7)
MCV RBC AUTO: 89.3 FL (ref 79–97)
MONOCYTES # BLD: 0.08 10*3/MM3 (ref 0.1–0.9)
NEUTROPHILS # BLD AUTO: 3 10*3/MM3 (ref 1.7–7)
NEUTROPHILS NFR BLD MANUAL: 79.6 % (ref 42.7–76)
PHOSPHATE SERPL-MCNC: 2.8 MG/DL (ref 2.5–4.5)
PLAT MORPH BLD: NORMAL
PLATELET # BLD AUTO: 57 10*3/MM3 (ref 140–450)
PMV BLD AUTO: 10.7 FL (ref 6–12)
POTASSIUM SERPL-SCNC: 3.5 MMOL/L (ref 3.5–5.2)
PROT SERPL-MCNC: 5.2 G/DL (ref 6–8.5)
PROTHROMBIN TIME: 15.9 SECONDS (ref 11.7–14.2)
RBC # BLD AUTO: 2.99 10*6/MM3 (ref 3.77–5.28)
RBC MORPH BLD: NORMAL
SODIUM SERPL-SCNC: 139 MMOL/L (ref 136–145)
VARIANT LYMPHS NFR BLD MANUAL: 18.3 % (ref 19.6–45.3)
WBC MORPH BLD: NORMAL
WBC NRBC COR # BLD AUTO: 3.77 10*3/MM3 (ref 3.4–10.8)

## 2024-03-04 PROCEDURE — 85025 COMPLETE CBC W/AUTO DIFF WBC: CPT

## 2024-03-04 PROCEDURE — 84100 ASSAY OF PHOSPHORUS: CPT | Performed by: INTERNAL MEDICINE

## 2024-03-04 PROCEDURE — 83735 ASSAY OF MAGNESIUM: CPT

## 2024-03-04 PROCEDURE — 80053 COMPREHEN METABOLIC PANEL: CPT | Performed by: INTERNAL MEDICINE

## 2024-03-04 PROCEDURE — 99232 SBSQ HOSP IP/OBS MODERATE 35: CPT | Performed by: INTERNAL MEDICINE

## 2024-03-04 PROCEDURE — 25010000002 PIPERACILLIN SOD-TAZOBACTAM PER 1 G: Performed by: INTERNAL MEDICINE

## 2024-03-04 PROCEDURE — 82948 REAGENT STRIP/BLOOD GLUCOSE: CPT

## 2024-03-04 PROCEDURE — 99233 SBSQ HOSP IP/OBS HIGH 50: CPT | Performed by: INTERNAL MEDICINE

## 2024-03-04 PROCEDURE — 85007 BL SMEAR W/DIFF WBC COUNT: CPT

## 2024-03-04 PROCEDURE — 85610 PROTHROMBIN TIME: CPT

## 2024-03-04 PROCEDURE — 82306 VITAMIN D 25 HYDROXY: CPT | Performed by: INTERNAL MEDICINE

## 2024-03-04 PROCEDURE — 25010000002 HYDROCORTISONE SOD SUC (PF) 100 MG RECONSTITUTED SOLUTION: Performed by: INTERNAL MEDICINE

## 2024-03-04 RX ORDER — POTASSIUM CHLORIDE 750 MG/1
40 TABLET, FILM COATED, EXTENDED RELEASE ORAL ONCE
Status: COMPLETED | OUTPATIENT
Start: 2024-03-04 | End: 2024-03-04

## 2024-03-04 RX ADMIN — Medication 10 ML: at 09:04

## 2024-03-04 RX ADMIN — PIPERACILLIN SODIUM AND TAZOBACTAM SODIUM 4.5 G: 4; .5 INJECTION, SOLUTION INTRAVENOUS at 21:03

## 2024-03-04 RX ADMIN — POTASSIUM CHLORIDE 40 MEQ: 750 TABLET, EXTENDED RELEASE ORAL at 13:16

## 2024-03-04 RX ADMIN — PIPERACILLIN SODIUM AND TAZOBACTAM SODIUM 4.5 G: 4; .5 INJECTION, SOLUTION INTRAVENOUS at 06:42

## 2024-03-04 RX ADMIN — Medication 10 ML: at 21:07

## 2024-03-04 RX ADMIN — HYDROCORTISONE SODIUM SUCCINATE 100 MG: 100 INJECTION, POWDER, FOR SOLUTION INTRAMUSCULAR; INTRAVENOUS at 18:01

## 2024-03-04 RX ADMIN — PIPERACILLIN SODIUM AND TAZOBACTAM SODIUM 4.5 G: 4; .5 INJECTION, SOLUTION INTRAVENOUS at 13:17

## 2024-03-04 RX ADMIN — POTASSIUM CHLORIDE 40 MEQ: 750 TABLET, EXTENDED RELEASE ORAL at 18:01

## 2024-03-04 RX ADMIN — DOCUSATE SODIUM 50MG AND SENNOSIDES 8.6MG 2 TABLET: 8.6; 5 TABLET, FILM COATED ORAL at 09:02

## 2024-03-04 RX ADMIN — HYDROCORTISONE SODIUM SUCCINATE 100 MG: 100 INJECTION, POWDER, FOR SOLUTION INTRAMUSCULAR; INTRAVENOUS at 09:03

## 2024-03-04 RX ADMIN — MIDODRINE HYDROCHLORIDE 10 MG: 5 TABLET ORAL at 18:01

## 2024-03-04 RX ADMIN — DOCUSATE SODIUM 50MG AND SENNOSIDES 8.6MG 2 TABLET: 8.6; 5 TABLET, FILM COATED ORAL at 21:03

## 2024-03-04 RX ADMIN — MIDODRINE HYDROCHLORIDE 10 MG: 5 TABLET ORAL at 06:42

## 2024-03-04 RX ADMIN — HYDROCORTISONE SODIUM SUCCINATE 100 MG: 100 INJECTION, POWDER, FOR SOLUTION INTRAMUSCULAR; INTRAVENOUS at 01:07

## 2024-03-04 NOTE — CASE MANAGEMENT/SOCIAL WORK
Discharge Planning Assessment  [facility]     Patient Name: Chantelle Vásquez  MRN: [id number]  Today's Date: 3/4/2024    Admit Date: 3/1/2024    Plan: Home with no needs   Discharge Needs Assessment       Row Name 03/04/24 1344       Living Environment    People in Home spouse;child(bobbi), dependent    Current Living Arrangements home    Potentially Unsafe Housing Conditions none    In the past 12 months has the electric, gas, oil, or water company threatened to shut off services in your home? No    Primary Care Provided by self    Provides Primary Care For child(bobbi)    Family Caregiver if Needed spouse    Quality of Family Relationships supportive    Able to Return to Prior Arrangements yes       Resource/Environmental Concerns    Resource/Environmental Concerns none    Transportation Concerns none       Transportation Needs    In the past 12 months, has lack of transportation kept you from medical appointments or from getting medications? no    In the past 12 months, has lack of transportation kept you from meetings, work, or from getting things needed for daily living? No       Food Insecurity    Within the past 12 months, you worried that your food would run out before you got the money to buy more. Never true    Within the past 12 months, the food you bought just didn't last and you didn't have money to get more. Never true       Transition Planning    Patient/Family Anticipates Transition to home    Patient/Family Anticipated Services at Transition none       Discharge Needs Assessment    Equipment Currently Used at Home none    Anticipated Changes Related to Illness none    Equipment Needed After Discharge none                   Discharge Plan       Row Name 03/04/24 1345       Plan    Plan Home with no needs    Plan Comments CCP spoke to patient at bedside to discuss discharge planning.  Pt is independent with ADL's.  She lives in a two story house with her  Hayes, [phone] and two dependent  son's.  She uses no DME to ambulate  She is independent with ADL's.  She has o history of HH or rehab stays.  Pt denies needs  CCP following                  Continued Care and Services - Admitted Since 3/1/2024    No active coordination exists for this encounter.          Demographic Summary    No documentation.                  Functional Status    No documentation.                  Psychosocial    No documentation.                  Abuse/Neglect    No documentation.                  Legal    No documentation.                  Substance Abuse    No documentation.                  Patient Forms    No documentation.                     Alona Graff RN

## 2024-03-04 NOTE — PROGRESS NOTES
[facility] INPATIENT PROGRESS NOTE         [facility] CARDIAC INTENSIVE CARE    3/4/2024      PATIENT IDENTIFICATION:  Name: Chantelle Vásquez ADMIT: 3/1/2024   : 1985  PCP: Shin Orr MD    MRN: [id number] LOS: 3 days   AGE/SEX: 39 y.o. female  ROOM: [facility]                     LOS 3    Reason for visit: Septic shock      SUBJECTIVE:      Resting comfortably.  Looks much better.  Off pressors.  No new issues overnight.  Can likely discontinue central line.    Objective   OBJECTIVE:    Vital Sign Min/Max for last 24 hours  Temp  Min: 98 °F (36.7 °C)  Max: 98.2 °F (36.8 °C)   BP  Min: 98/80  Max: 126/80   Pulse  Min: 62  Max: 86   Resp  Min: 16  Max: 18   SpO2  Min: 94 %  Max: 100 %   No data recorded   Weight  Min: 79.9 kg (176 lb 2.4 oz)  Max: 79.9 kg (176 lb 2.4 oz)    Vitals:    24 0400 24 0500 24 0600 24 0700   BP: 107/81 107/81 118/78 126/80   BP Location: Right arm      Patient Position: Lying      Pulse: 67 66 70 66   Resp: 16      Temp:       TempSrc:       SpO2: 95% 94% 94% 100%   Weight:   79.9 kg (176 lb 2.4 oz)    Height:                24  2109 24  0607 24  0600   Weight: 71.2 kg (156 lb 15.5 oz) 79.8 kg (175 lb 14.8 oz) 79.9 kg (176 lb 2.4 oz)       Body mass index is 26.78 kg/m².                          Body mass index is 26.78 kg/m².    Intake/Output Summary (Last 24 hours) at 3/4/2024 0922  Last data filed at 3/4/2024 0642  Gross per 24 hour   Intake 1539.2 ml   Output 1130 ml   Net 409.2 ml         Exam:  GEN:  No distress, appears stated age  EYES:   Icteric sclera, PERRL  ENT:    External ears/nose normal, OP clear.    NECK:  No adenopathy, midline trachea  LUNGS: Normal chest on inspection, palpation and auscultation  CV:  Normal S1S2, without murmur  ABD:  Nontender, nondistended, no hepatosplenomegaly, +BS  EXT:  Nonpitting edema.  No cyanosis or clubbing.  No mottling and normal cap refill.    Assessment     Scheduled meds:   hydrocortisone sodium succinate, 100 mg, Intravenous, Q8H  midodrine, 10 mg, Oral, BID AC  piperacillin-tazobactam, 4.5 g, Intravenous, Q8H  senna-docusate sodium, 2 tablet, Oral, BID  sodium chloride, 10 mL, Intravenous, Q12H      IV meds:                      Norepinephrine-Sodium Chloride, 0.02-0.3 mcg/kg/min, Last Rate: Stopped (03/03/24 0522)  phenylephrine, 0.5-3 mcg/kg/min, Last Rate: Stopped (03/02/24 1133)  vasopressin, 0.03 Units/min, Last Rate: Stopped (03/02/24 1334)      Data Review:  Results from last 7 days   Lab Units 03/04/24 0105 03/03/24  1450 03/03/24  0524 03/02/24  1823 03/02/24  0946   SODIUM mmol/L 139 138 140 137 137   POTASSIUM mmol/L 3.5 3.6 3.4* 3.9 5.0   CHLORIDE mmol/L 103 102 101 104 105   CO2 mmol/L 25.8 25.0 23.4 17.6* 13.1*   BUN mg/dL 31* 33* 34* 34* 32*   CREATININE mg/dL 1.63* 1.81* 2.04* 2.45* 2.57*   GLUCOSE mg/dL 111* 110* 140* 145* 99   CALCIUM mg/dL 7.5* 7.8* 6.8* 6.9* 6.4*         Estimated Creatinine Clearance: 51.4 mL/min (A) (by C-G formula based on SCr of 1.63 mg/dL (H)).  Results from last 7 days   Lab Units 03/04/24 0105 03/03/24 0524 03/02/24  0403 03/01/24  1612   WBC 10*3/mm3 3.77 3.18* 6.93 5.86   HEMOGLOBIN g/dL 8.6* 8.3* 10.1* 11.5*   PLATELETS 10*3/mm3 57* 54* 117* 122*     Results from last 7 days   Lab Units 03/04/24 0105 03/03/24  0524 03/02/24  0403   INR  1.25* 1.38* 2.05*     Results from last 7 days   Lab Units 03/04/24 0105 03/03/24  0524 03/02/24  1823 03/02/24  0403 03/01/24  1612   ALT (SGPT) U/L 68* 69* 81* 114* 154*   AST (SGOT) U/L 43* 37* 50* 83* 153*         Results from last 7 days   Lab Units 03/03/24  0802 03/02/24  1421 03/02/24  0602 03/02/24  0042 03/01/24  2117 03/01/24  1916 03/01/24  1612   PROCALCITONIN ng/mL  --   --   --  15.20*  --   --  16.90*   LACTATE mmol/L 2.2* 3.1* 4.6* 3.5* 4.5* 4.4* 6.1*         Glucose   Date/Time Value Ref Range Status   03/02/2024 1823 139 (H) 70 - 130 mg/dL Final   03/02/2024 0754 91 70 - 130 mg/dL  Final   03/01/2024 2106 73 70 - 130 mg/dL Final         Imaging reviewed  Chest x-ray 3/2 reviewed        CT abdomen pelvis without contrast reviewed        Microbiology reviewed            Active Hospital Problems    Diagnosis  POA    **Sepsis [A41.9]  Yes      Resolved Hospital Problems   No resolved problems to display.         ASSESSMENT:  Septic shock  History of biliary atresia  Hyperbilirubinemia  Transaminitis  UTI  Lactic acidosis  SERGE  Met acidosis  Thrombocytopenia/anemia: Secondary to sepsis  Hyponatremia  Hypokalemia  Nausea vomiting emesis  Anorexia  Coagulopathy      PLAN:  Pressor requirements improved.  Off all pressors.  Continue broad-spectrum antibiotics per ID recommendations.  Stress dose steroids and will consider starting to wean tomorrow.  If able to get peripheral IV access can discontinue central line.  MRCP when able to tolerate.  GI following.  Lactic acidosis improving.  Mechanical DVT prophylaxis.    Discussed with family at bedside.    Discussed with multidisciplinary ICU team on rounds this morning.          Pavan Mernio MD  Pulmonary and Critical Care Medicine  [facility], [facility]  3/4/2024    09:22 EST

## 2024-03-04 NOTE — PROGRESS NOTES
ID note for septic shock  S:   She feels a little volume overloaded but overall is feeling okay.  Much better than admission.  Off pressors.  Afebrile.  Tolerating Zosyn.  No significant pain.    Physical Exam:   Vital Signs   Temp:  [97.6 °F (36.4 °C)-98.2 °F (36.8 °C)] 97.6 °F (36.4 °C)  Heart Rate:  [62-80] 66  Resp:  [16-18] 16  BP: ()/(69-92) 134/92    GENERAL: Awake and alert, ill-appearing  HEENT: Oropharynx is clear. Hearing is grossly normal.   EYES: . No conjunctival injection. No lid lag.   LUNGS:normal respiratory effort.   SKIN: no cutaneous eruptions in exposed areas.  Jaundice  PSYCHIATRIC: Appropriate mood, affect, insight, and judgment.     Results Review:  TB 5.6 alt 68, ast 43  Cr 1.63  WBC 3.7    3/1 Bcx NGTD  3/1 strep A pcr neg  3/1 Ucx neg  3/1 MRSA PCR neg  3/1 RPP neg  3/1 Ucx neg    Chest x-ray independently interpreted: No acute pna    A/p  1.  Septic shock  2.  Biliary atresia  3.  Cirrhosis with portal hypertension  4.  Acute kidney injury  5.  Lactic acid acidosis    Suspected biliary sepsis  LFTs have seemed to plateau; MRCP as per GI likely tomorrow  Cont zosyn  Bcx remain negative. Ucx negative

## 2024-03-04 NOTE — PROGRESS NOTES
[facility]  Inpatient Progress Note    Reason for Follow Up:  sepsis    Subjective     Interval History:   Weaned off pressors yesterday  Overall feels better.  She reports that she feels full in her abdomen and somewhat in her chest although his breathing is better.  She has diffuse fluid retention.  No nausea or vomiting.    Current Facility-Administered Medications:     acetaminophen (TYLENOL) tablet 650 mg, 650 mg, Oral, Q8H PRN **OR** acetaminophen (TYLENOL) suppository 650 mg, 650 mg, Rectal, Q8H PRN, Winsome Martin APRN    sennosides-docusate (PERICOLACE) 8.6-50 MG per tablet 2 tablet, 2 tablet, Oral, BID, 2 tablet at 03/04/24 0902 **AND** polyethylene glycol (MIRALAX) packet 17 g, 17 g, Oral, Daily PRN **AND** bisacodyl (DULCOLAX) EC tablet 5 mg, 5 mg, Oral, Daily PRN **AND** bisacodyl (DULCOLAX) suppository 10 mg, 10 mg, Rectal, Daily PRN, Winsome Martin APRN    Hydrocortisone Sod Suc (PF) (Solu-CORTEF) injection 100 mg, 100 mg, Intravenous, Q8H, Pavan Merino MD, 100 mg at 03/04/24 0903    midodrine (PROAMATINE) tablet 10 mg, 10 mg, Oral, BID TIARA, Dave Olmedo MD, 10 mg at 03/04/24 0642    nitroglycerin (NITROSTAT) SL tablet 0.4 mg, 0.4 mg, Sublingual, Q5 Min PRN, Winsome Martin APRN    norepinephrine (LEVOPHED) 8 mg in 250 mL NS infusion (premix), 0.02-0.3 mcg/kg/min, Intravenous, Titrated, Winsome Martin APRN, Stopped at 03/03/24 0522    ondansetron (ZOFRAN) injection 4 mg, 4 mg, Intravenous, Q6H PRN, Winsome Martin APRN    phenylephrine (JULIUS-SYNEPHRINE) 50 mg in 250 mL NS infusion, 0.5-3 mcg/kg/min, Intravenous, Titrated, Winsome Martin APRN, Stopped at 03/02/24 1133    piperacillin-tazobactam (ZOSYN) 4.5 g in iso-osmotic dextrose 100 mL IVPB (premix), 4.5 g, Intravenous, Q8H, Tommy Swift MD, 4.5 g at 03/04/24 0642    sodium chloride 0.9 % flush 10 mL, 10 mL, Intravenous, Q12H, Winsome Martin APRN, 10 mL at 03/04/24 0904    sodium  chloride 0.9 % flush 10 mL, 10 mL, Intravenous, PRN, Winsome Martin APRN    sodium chloride 0.9 % infusion 40 mL, 40 mL, Intravenous, PRN, Winsome Martin APRN    vasopressin (PITRESSIN) 20 units in 100 mL NS infusion, 0.03 Units/min, Intravenous, Continuous, Winsome Martin APRN, Stopped at 03/02/24 1334  Review of Systems:    Negative for shortness of air or cough, negative for fever chills    Objective     Vital Signs  Temp:  [97.6 °F (36.4 °C)-98.2 °F (36.8 °C)] 97.6 °F (36.4 °C)  Heart Rate:  [62-80] 66  Resp:  [16-18] 16  BP: ()/(69-92) 134/92  Body mass index is 26.78 kg/m².    Intake/Output Summary (Last 24 hours) at 3/4/2024 1011  Last data filed at 3/4/2024 0642  Gross per 24 hour   Intake 1539.2 ml   Output 1130 ml   Net 409.2 ml     No intake/output data recorded.     Physical Exam:   General: patient awake, alert and cooperative   Eyes: Normal lids and lashes, no scleral icterus   Neck: supple, normal ROM   Skin: warm and dry, not jaundiced   Cardiovascular: regular rhythm and rate    Pulm: clear to auscultation bilaterally, regular and unlabored   Abdomen: soft, nontender, distended; normal bowel sounds   Extremities: Right upper extremity with greater edema than left, bilateral lower extremity edema   Psychiatric: Normal mood and behavior; memory intact     Results Review:     I reviewed the patient's new clinical results.    Results from last 7 days   Lab Units 03/04/24  0105 03/03/24  0524 03/02/24  0403   WBC 10*3/mm3 3.77 3.18* 6.93   HEMOGLOBIN g/dL 8.6* 8.3* 10.1*   HEMATOCRIT % 26.7* 24.2* 30.3*   PLATELETS 10*3/mm3 57* 54* 117*     Results from last 7 days   Lab Units 03/04/24  0105 03/03/24  1450 03/03/24  0524 03/02/24  1823   SODIUM mmol/L 139 138 140 137   POTASSIUM mmol/L 3.5 3.6 3.4* 3.9   CHLORIDE mmol/L 103 102 101 104   CO2 mmol/L 25.8 25.0 23.4 17.6*   BUN mg/dL 31* 33* 34* 34*   CREATININE mg/dL 1.63* 1.81* 2.04* 2.45*   CALCIUM mg/dL 7.5* 7.8* 6.8* 6.9*   BILIRUBIN mg/dL  5.6*  --  5.5* 5.5*   ALK PHOS U/L 227*  --  116 129*   ALT (SGPT) U/L 68*  --  69* 81*   AST (SGOT) U/L 43*  --  37* 50*   GLUCOSE mg/dL 111* 110* 140* 145*     Results from last 7 days   Lab Units 03/04/24  0105 03/03/24  0524 03/02/24  0403   INR  1.25* 1.38* 2.05*     Lab Results   Lab Value Date/Time    LIPASE 29 03/01/2024 1612    LIPASE 49 02/03/2024 0642       Radiology:  XR Chest 1 View   Final Result       1. Right IJ catheter tip is in the mid SVC. No pneumothorax.   2. Small amount of bibasilar subsegmental atelectasis       This report was finalized on 3/2/2024 1:44 PM by Dr. Dave Dixon M.D   on Workstation: [id number]          CT Abdomen Pelvis Without Contrast   Final Result       1. Cirrhosis with portal hypertension (splenomegaly, portosystemic   collateral vessels and small amount of ascites).   2. Steatosis seen in the left hepatic lobe.       This report was finalized on 3/1/2024 5:17 PM by Dr. Dave Dixon M.D   on Workstation: [id number]          XR Chest 1 View   Final Result          Assessment & Plan     Active Hospital Problems    Diagnosis     **Sepsis      All problems are new to me today  Assessment:  Septic shock  Biliary atresia  Cirrhosis  Minimal ascites        Plan:  Continue broad specturum abx for sepsis  Advance to low-fat full liquid diet  Monitor LFTs, TB and transaminases stable, alk phos increased today.    MRCP when able to tolerate for delineation of anatomy-tentatively planning for tomorrow.  She still feels somewhat fluid overloaded in terms of her breathing     I discussed the patients findings and my recommendations with patient and nursing staff.            Cherie Amador M.D.  [facility]  [phone]

## 2024-03-04 NOTE — PLAN OF CARE
Problem: Adult Inpatient Plan of Care  Goal: Plan of Care Review  Outcome: Ongoing, Progressing  Flowsheets  Taken 3/3/2024 1950 by Daylin Perry RN  Progress: improving  Taken 3/3/2024 0648 by Rishi Zamora RN  Plan of Care Reviewed With: patient   Goal Outcome Evaluation:           Progress: improving     Pt remains in CICU. Pt alert and oriented x4. Full strength in all extremities. Vital signs stable. AM labs unremarkable. Ambulated in room at start of shift. Rested well throughout night. Needs assessed and met throughout shift.

## 2024-03-04 NOTE — PROGRESS NOTES
[facility] Progress Note      Patient Name: Chantelle Vásquez  : 1985  MRN: [id number]  Primary Care Physician:  Shin Orr MD  Date of admission: 3/1/2024    Subjective     Interval History:   F/u SERGE    Patient is feeling much better, denies any chest pain, she had mild shortness of breath, no nausea or vomiting, no abdominal pain, no dysuria or gross hematuria, no lightheadedness    Review of Systems:   As noted above    Objective     Vitals:   Temp:  [98 °F (36.7 °C)-98.2 °F (36.8 °C)] 98.1 °F (36.7 °C)  Heart Rate:  [62-86] 66  Resp:  [16-18] 16  BP: ()/(69-88) 126/80    Intake/Output Summary (Last 24 hours) at 3/4/2024 0908  Last data filed at 3/4/2024 0642  Gross per 24 hour   Intake 1539.2 ml   Output 1130 ml   Net 409.2 ml       Physical Exam:    General Appearance: Awake, alert, no acute distress  Skin: Warm and dry  Neck: RIJ TLC, no JVD  Lungs: Decreased breath sounds bilaterally, breathing effort not labored  Heart: RRR, no S3, no rub  Abdomen: soft, nontender, nondistended, normoactive bowel  : No palpable bladder  Extremities: No edema  Neuro: normal speech and mental status     Scheduled Meds:     hydrocortisone sodium succinate, 100 mg, Intravenous, Q8H  midodrine, 10 mg, Oral, BID AC  piperacillin-tazobactam, 4.5 g, Intravenous, Q8H  senna-docusate sodium, 2 tablet, Oral, BID  sodium chloride, 10 mL, Intravenous, Q12H      IV Meds:   Norepinephrine-Sodium Chloride, 0.02-0.3 mcg/kg/min, Last Rate: Stopped (24 0522)  phenylephrine, 0.5-3 mcg/kg/min, Last Rate: Stopped (24 1133)  vasopressin, 0.03 Units/min, Last Rate: Stopped (24 1334)        Results Reviewed:   I have personally reviewed the results from the time of this admission to 3/4/2024 09:08 EST     Results from last 7 days   Lab Units 24  0105 24  1450 24  0524 24  1823   SODIUM mmol/L 139 138 140 137   POTASSIUM mmol/L 3.5 3.6 3.4* 3.9   CHLORIDE  mmol/L 103 102 101 104   CO2 mmol/L 25.8 25.0 23.4 17.6*   BUN mg/dL 31* 33* 34* 34*   CREATININE mg/dL 1.63* 1.81* 2.04* 2.45*   CALCIUM mg/dL 7.5* 7.8* 6.8* 6.9*   BILIRUBIN mg/dL 5.6*  --  5.5* 5.5*   ALK PHOS U/L 227*  --  116 129*   ALT (SGPT) U/L 68*  --  69* 81*   AST (SGOT) U/L 43*  --  37* 50*   GLUCOSE mg/dL 111* 110* 140* 145*     Estimated Creatinine Clearance: 51.4 mL/min (A) (by C-G formula based on SCr of 1.63 mg/dL (H)).  Results from last 7 days   Lab Units 03/04/24  0105 03/03/24  0524 03/02/24  1823   MAGNESIUM mg/dL 2.7* 2.5 2.3   PHOSPHORUS mg/dL 2.8 3.0  --          Results from last 7 days   Lab Units 03/04/24  0105 03/03/24  0524 03/02/24  0403 03/01/24  1612   WBC 10*3/mm3 3.77 3.18* 6.93 5.86   HEMOGLOBIN g/dL 8.6* 8.3* 10.1* 11.5*   PLATELETS 10*3/mm3 57* 54* 117* 122*     Results from last 7 days   Lab Units 03/04/24  0105 03/03/24  0524 03/02/24  0403   INR  1.25* 1.38* 2.05*       Assessment / Plan     ASSESSMENT:  SERGE -associated with hypotension leading to degree of probable ATN associate with poor oral intake and sepsis.  Creatinine prerenal azotemia from vol depletion with n/v, poor oral intake, and sepsis, the creatinine down to 1.63, electrolyte within acceptable range, bilirubin 5.6, urinalysis on 3/1/2023 appears to have mild hematuria and positive protein and blood by dipstick I will recheck to see if it is still active or not.    Septic shock - ID suspect biliary source.  Off vasopressors,   Pancytopenia, WBCs today 3.77, platelets 57,000 and hemoglobin 8.6   High anion gap metabolic acidosis lactic acidosis anion gap is closing last lactic acid yesterday was down to 2.2  Congenital biliary atresia + cirrhosis.  Minimal ascites on CT.  GI following  Hyperbilirubinemia + transaminitis  Coagulopathy, INR down to 1.25  Vitamin D deficiency, vitamin D level is 10 calcium is 7.5 but, near normal when corrected to albumin, patient will need to be on vitamin D supplement which she  will consider after the acute phase of her ailment has resolved.  History of hypertension prior to admission treated with beta-blocker diagnosed approximately 11 years ago when she was pregnant at that time, currently blood pressure improved requiring a small dose of midodrine will continue to monitor and decide when to wean it off.    PLAN:  Continue the same treatment  Surveillance labs    I reviewed the chart and other providers notes, I reviewed labs.  I discussed the case with the patient and she voiced good understanding  Copied text in this note has been reviewed and is accurate as of 03/04/24.         Aneudy Gould MD  03/04/24  09:08 [facility]    [facility]  [phone]

## 2024-03-04 NOTE — PLAN OF CARE
Goal Outcome Evaluation:           Progress: improving  Outcome Evaluation: Pt advanced to full liquid, low fat diet today; tolerating well. Waiting to d/c central line until second peripheral access is obtained. Pt is off all pressors and has remained on room air throughout shift. Pt is standby assist and did ambulate around the unit today. Plan of care discussed with patient.

## 2024-03-05 ENCOUNTER — APPOINTMENT (OUTPATIENT)
Dept: MRI IMAGING | Facility: HOSPITAL | Age: 39
DRG: 871 | End: 2024-03-05

## 2024-03-05 PROBLEM — K74.60 CIRRHOSIS OF LIVER: Status: ACTIVE | Noted: 2024-03-05

## 2024-03-05 PROBLEM — R79.89 ELEVATED LFTS: Status: ACTIVE | Noted: 2024-03-05

## 2024-03-05 PROBLEM — E80.6 HYPERBILIRUBINEMIA: Status: ACTIVE | Noted: 2024-03-05

## 2024-03-05 PROBLEM — D61.818 PANCYTOPENIA: Status: ACTIVE | Noted: 2024-03-05

## 2024-03-05 PROBLEM — N17.9 AKI (ACUTE KIDNEY INJURY): Status: ACTIVE | Noted: 2024-03-05

## 2024-03-05 PROBLEM — Q44.2 BILIARY ATRESIA: Status: ACTIVE | Noted: 2024-03-05

## 2024-03-05 LAB
ALBUMIN SERPL-MCNC: 3.2 G/DL (ref 3.5–5.2)
ALBUMIN/GLOB SERPL: 1.9 G/DL
ALP SERPL-CCNC: 356 U/L (ref 39–117)
ALT SERPL W P-5'-P-CCNC: 53 U/L (ref 1–33)
ANION GAP SERPL CALCULATED.3IONS-SCNC: 6 MMOL/L (ref 5–15)
AST SERPL-CCNC: 34 U/L (ref 1–32)
BILIRUB SERPL-MCNC: 2.9 MG/DL (ref 0–1.2)
BUN SERPL-MCNC: 27 MG/DL (ref 6–20)
BUN/CREAT SERPL: 22 (ref 7–25)
CALCIUM SPEC-SCNC: 8 MG/DL (ref 8.6–10.5)
CHLORIDE SERPL-SCNC: 104 MMOL/L (ref 98–107)
CO2 SERPL-SCNC: 28 MMOL/L (ref 22–29)
CREAT SERPL-MCNC: 1.23 MG/DL (ref 0.57–1)
DEPRECATED RDW RBC AUTO: 37.7 FL (ref 37–54)
EGFRCR SERPLBLD CKD-EPI 2021: 57.4 ML/MIN/1.73
EOSINOPHIL # BLD MANUAL: 0.05 10*3/MM3 (ref 0–0.4)
EOSINOPHIL NFR BLD MANUAL: 1 % (ref 0.3–6.2)
ERYTHROCYTE [DISTWIDTH] IN BLOOD BY AUTOMATED COUNT: 12.1 % (ref 12.3–15.4)
GLOBULIN UR ELPH-MCNC: 1.7 GM/DL
GLUCOSE SERPL-MCNC: 115 MG/DL (ref 65–99)
HCT VFR BLD AUTO: 28.1 % (ref 34–46.6)
HGB BLD-MCNC: 9.3 G/DL (ref 12–15.9)
LYMPHOCYTES # BLD MANUAL: 1.05 10*3/MM3 (ref 0.7–3.1)
LYMPHOCYTES NFR BLD MANUAL: 4 % (ref 5–12)
MAGNESIUM SERPL-MCNC: 2.1 MG/DL (ref 1.6–2.6)
MCH RBC QN AUTO: 28.4 PG (ref 26.6–33)
MCHC RBC AUTO-ENTMCNC: 33.1 G/DL (ref 31.5–35.7)
MCV RBC AUTO: 85.7 FL (ref 79–97)
MONOCYTES # BLD: 0.18 10*3/MM3 (ref 0.1–0.9)
NEUTROPHILS # BLD AUTO: 3.29 10*3/MM3 (ref 1.7–7)
NEUTROPHILS NFR BLD MANUAL: 72 % (ref 42.7–76)
PHOSPHATE SERPL-MCNC: 2.1 MG/DL (ref 2.5–4.5)
PLAT MORPH BLD: NORMAL
PLATELET # BLD AUTO: 57 10*3/MM3 (ref 140–450)
PMV BLD AUTO: 10.7 FL (ref 6–12)
POTASSIUM SERPL-SCNC: 4 MMOL/L (ref 3.5–5.2)
PROT SERPL-MCNC: 4.9 G/DL (ref 6–8.5)
RBC # BLD AUTO: 3.28 10*6/MM3 (ref 3.77–5.28)
RBC MORPH BLD: NORMAL
SODIUM SERPL-SCNC: 138 MMOL/L (ref 136–145)
URATE SERPL-MCNC: 2.9 MG/DL (ref 2.4–5.7)
VARIANT LYMPHS NFR BLD MANUAL: 23 % (ref 19.6–45.3)
WBC MORPH BLD: NORMAL
WBC NRBC COR # BLD AUTO: 4.57 10*3/MM3 (ref 3.4–10.8)

## 2024-03-05 PROCEDURE — 25010000002 PIPERACILLIN SOD-TAZOBACTAM PER 1 G: Performed by: INTERNAL MEDICINE

## 2024-03-05 PROCEDURE — 0 GADOBENATE DIMEGLUMINE 529 MG/ML SOLUTION: Performed by: INTERNAL MEDICINE

## 2024-03-05 PROCEDURE — 99232 SBSQ HOSP IP/OBS MODERATE 35: CPT | Performed by: INTERNAL MEDICINE

## 2024-03-05 PROCEDURE — 25010000002 HYDROCORTISONE SOD SUC (PF) 100 MG RECONSTITUTED SOLUTION: Performed by: INTERNAL MEDICINE

## 2024-03-05 PROCEDURE — 80053 COMPREHEN METABOLIC PANEL: CPT | Performed by: INTERNAL MEDICINE

## 2024-03-05 PROCEDURE — A9577 INJ MULTIHANCE: HCPCS | Performed by: INTERNAL MEDICINE

## 2024-03-05 PROCEDURE — 83735 ASSAY OF MAGNESIUM: CPT | Performed by: INTERNAL MEDICINE

## 2024-03-05 PROCEDURE — 85007 BL SMEAR W/DIFF WBC COUNT: CPT | Performed by: INTERNAL MEDICINE

## 2024-03-05 PROCEDURE — 74183 MRI ABD W/O CNTR FLWD CNTR: CPT

## 2024-03-05 PROCEDURE — 84550 ASSAY OF BLOOD/URIC ACID: CPT | Performed by: INTERNAL MEDICINE

## 2024-03-05 PROCEDURE — 85025 COMPLETE CBC W/AUTO DIFF WBC: CPT | Performed by: INTERNAL MEDICINE

## 2024-03-05 PROCEDURE — 84100 ASSAY OF PHOSPHORUS: CPT | Performed by: INTERNAL MEDICINE

## 2024-03-05 RX ORDER — FENTANYL/ROPIVACAINE/NS/PF 2-625MCG/1
15 PLASTIC BAG, INJECTION (ML) EPIDURAL ONCE
Status: DISCONTINUED | OUTPATIENT
Start: 2024-03-05 | End: 2024-03-05

## 2024-03-05 RX ADMIN — PIPERACILLIN SODIUM AND TAZOBACTAM SODIUM 4.5 G: 4; .5 INJECTION, SOLUTION INTRAVENOUS at 23:03

## 2024-03-05 RX ADMIN — MIDODRINE HYDROCHLORIDE 10 MG: 5 TABLET ORAL at 16:43

## 2024-03-05 RX ADMIN — PIPERACILLIN SODIUM AND TAZOBACTAM SODIUM 4.5 G: 4; .5 INJECTION, SOLUTION INTRAVENOUS at 14:19

## 2024-03-05 RX ADMIN — HYDROCORTISONE SODIUM SUCCINATE 100 MG: 100 INJECTION, POWDER, FOR SOLUTION INTRAMUSCULAR; INTRAVENOUS at 01:31

## 2024-03-05 RX ADMIN — Medication 10 ML: at 09:56

## 2024-03-05 RX ADMIN — HYDROCORTISONE SODIUM SUCCINATE 50 MG: 100 INJECTION, POWDER, FOR SOLUTION INTRAMUSCULAR; INTRAVENOUS at 14:20

## 2024-03-05 RX ADMIN — Medication 10 ML: at 20:33

## 2024-03-05 RX ADMIN — PIPERACILLIN SODIUM AND TAZOBACTAM SODIUM 4.5 G: 4; .5 INJECTION, SOLUTION INTRAVENOUS at 06:47

## 2024-03-05 RX ADMIN — GADOBENATE DIMEGLUMINE 15 ML: 529 INJECTION, SOLUTION INTRAVENOUS at 09:00

## 2024-03-05 RX ADMIN — DOCUSATE SODIUM 50MG AND SENNOSIDES 8.6MG 2 TABLET: 8.6; 5 TABLET, FILM COATED ORAL at 20:32

## 2024-03-05 NOTE — PLAN OF CARE
Goal Outcome Evaluation:  Plan of Care Reviewed With: patient        Progress: improving     Pt remains in CICU, VSS. NPO since midnight for MRCP and MRI form completed.

## 2024-03-05 NOTE — PROGRESS NOTES
[facility]  Inpatient Progress Note    Reason for Follow Up:  sepsis    Subjective     Interval History:   She feels better today in general.  Breathing better.  Still bloated but less edema UE.    Current Facility-Administered Medications:     acetaminophen (TYLENOL) tablet 650 mg, 650 mg, Oral, Q8H PRN **OR** acetaminophen (TYLENOL) suppository 650 mg, 650 mg, Rectal, Q8H PRN, Winsome Martin APRN    sennosides-docusate (PERICOLACE) 8.6-50 MG per tablet 2 tablet, 2 tablet, Oral, BID, 2 tablet at 03/04/24 2103 **AND** polyethylene glycol (MIRALAX) packet 17 g, 17 g, Oral, Daily PRN **AND** bisacodyl (DULCOLAX) EC tablet 5 mg, 5 mg, Oral, Daily PRN **AND** bisacodyl (DULCOLAX) suppository 10 mg, 10 mg, Rectal, Daily PRN, Winsome Martin APRN    Calcium Replacement - Follow Nurse / BPA Driven Protocol, , Does not apply, PRN, Pavan Merino MD    Hydrocortisone Sod Suc (PF) (Solu-CORTEF) injection 100 mg, 100 mg, Intravenous, Q8H, Pavan Merino MD, 100 mg at 03/05/24 0131    Magnesium Standard Dose Replacement - Follow Nurse / BPA Driven Protocol, , Does not apply, PRN, Pavan Merino MD    midodrine (PROAMATINE) tablet 10 mg, 10 mg, Oral, BID TIARA, Dave Olmedo MD, 10 mg at 03/04/24 1801    nitroglycerin (NITROSTAT) SL tablet 0.4 mg, 0.4 mg, Sublingual, Q5 Min PRN, Winsome Martin APRN    ondansetron (ZOFRAN) injection 4 mg, 4 mg, Intravenous, Q6H PRN, Winsome Martin APRN    Phosphorus Replacement - Follow Nurse / BPA Driven Protocol, , Does not apply, PRAngelique KAUR Mark Edwin, MD    piperacillin-tazobactam (ZOSYN) 4.5 g in iso-osmotic dextrose 100 mL IVPB (premix), 4.5 g, Intravenous, Q8H, Tommy Swift MD, 4.5 g at 03/04/24 2103    potassium phosphate 15 mmol in 0.9% sodium chloride 100 mL IVPB, 15 mmol, Intravenous, Once, Yessenia Acevedo APRN    Potassium Replacement - Follow Nurse / BPA Driven Protocol, , Does not apply, PRN, Pavan Merino  MD Andreas    sodium chloride 0.9 % flush 10 mL, 10 mL, Intravenous, Q12H, Winsome Martin APRN, 10 mL at 03/04/24 2107    sodium chloride 0.9 % flush 10 mL, 10 mL, Intravenous, PRN, Winsome Martin APRN    sodium chloride 0.9 % infusion 40 mL, 40 mL, Intravenous, PRN, Winsome Martin APRN  Review of Systems:    + abd distension, no pain or nausea, no f/c    Objective     Vital Signs  Temp:  [97.6 °F (36.4 °C)-97.8 °F (36.6 °C)] 97.8 °F (36.6 °C)  Heart Rate:  [58-79] 64  Resp:  [14-16] 14  BP: (102-140)/(73-98) 140/90  Body mass index is 26.78 kg/m².    Intake/Output Summary (Last 24 hours) at 3/5/2024 0605  Last data filed at 3/4/2024 1800  Gross per 24 hour   Intake 1400 ml   Output 1250 ml   Net 150 ml     No intake/output data recorded.     Physical Exam:   General: patient awake, alert and cooperative   Eyes: Normal lids and lashes, no scleral icterus   Neck: supple, normal ROM   Skin: warm and dry, not jaundiced   Pulm: regular and unlabored   Abdomen: soft, nontender, distended; normal bowel sounds   Extremities: bilat le edema   Psychiatric: Normal mood and behavior; memory intact     Results Review:     I reviewed the patient's new clinical results.    Results from last 7 days   Lab Units 03/05/24 0323 03/04/24 0105 03/03/24 0524   WBC 10*3/mm3 4.57 3.77 3.18*   HEMOGLOBIN g/dL 9.3* 8.6* 8.3*   HEMATOCRIT % 28.1* 26.7* 24.2*   PLATELETS 10*3/mm3 57* 57* 54*     Results from last 7 days   Lab Units 03/05/24 0323 03/04/24  0105 03/03/24  1450 03/03/24 0524   SODIUM mmol/L 138 139 138 140   POTASSIUM mmol/L 4.0 3.5 3.6 3.4*   CHLORIDE mmol/L 104 103 102 101   CO2 mmol/L 28.0 25.8 25.0 23.4   BUN mg/dL 27* 31* 33* 34*   CREATININE mg/dL 1.23* 1.63* 1.81* 2.04*   CALCIUM mg/dL 8.0* 7.5* 7.8* 6.8*   BILIRUBIN mg/dL 2.9* 5.6*  --  5.5*   ALK PHOS U/L 356* 227*  --  116   ALT (SGPT) U/L 53* 68*  --  69*   AST (SGOT) U/L 34* 43*  --  37*   GLUCOSE mg/dL 115* 111* 110* 140*     Results from last 7 days   Lab  Units 03/04/24  0105 03/03/24  0524 03/02/24  0403   INR  1.25* 1.38* 2.05*     Lab Results   Lab Value Date/Time    LIPASE 29 03/01/2024 1612    LIPASE 49 02/03/2024 0642       Radiology:  XR Chest 1 View   Final Result       1. Right IJ catheter tip is in the mid SVC. No pneumothorax.   2. Small amount of bibasilar subsegmental atelectasis       This report was finalized on 3/2/2024 1:44 PM by Dr. Dave Dixon M.D   on Workstation: [id number]          CT Abdomen Pelvis Without Contrast   Final Result       1. Cirrhosis with portal hypertension (splenomegaly, portosystemic   collateral vessels and small amount of ascites).   2. Steatosis seen in the left hepatic lobe.       This report was finalized on 3/1/2024 5:17 PM by Dr. Dave Dixon M.D   on Workstation: [id number]          XR Chest 1 View   Final Result      MRI Abdomen With & Without Contrast    (Results Pending)       Assessment & Plan     Active Hospital Problems    Diagnosis     **Sepsis        Assessment:  Septic shock  Biliary atresia  Cirrhosis  Minimal ascites      Plan:  Continue broad specturum abx for sepsis  Advance to regular diet after MRI  Monitor LFTs  MRCP today for delineation of anatomy   Clinically improving and TB declining which suggests that elevation was at least in part probably due to sepsis    I discussed the patients findings and my recommendations with patient.            Cherie Amador M.D.  [facility], [place], operated by [facility]  [phone]

## 2024-03-05 NOTE — PROGRESS NOTES
[facility] INPATIENT PROGRESS NOTE         [facility] CARDIAC INTENSIVE CARE    3/5/2024      PATIENT IDENTIFICATION:  Name: Chantelle Vásquez ADMIT: 3/1/2024   : 1985  PCP: Shin Orr MD    MRN: [id number] LOS: 4 days   AGE/SEX: 39 y.o. female  ROOM: [facility] [place]                     LOS 4    Reason for visit: Septic shock      SUBJECTIVE:      Resting comfortably.  Not requiring any pressors.  Discussed with Dr. Amador with gastroenterology.  Plan for MRCP today.  Can Transfer out of intensive care.    Objective   OBJECTIVE:    Vital Sign Min/Max for last 24 hours  Temp  Min: 97.6 °F (36.4 °C)  Max: 98.4 °F (36.9 °C)   BP  Min: 102/73  Max: 140/90   Pulse  Min: 55  Max: 79   Resp  Min: 14  Max: 16   SpO2  Min: 91 %  Max: 100 %   No data recorded   Weight  Min: 79.9 kg (176 lb 2.4 oz)  Max: 79.9 kg (176 lb 2.4 oz)    Vitals:    24 0500 24 0600 24 0700 24 0725   BP: 134/86  138/89    Pulse: 66 55 70    Resp:  14  14   Temp:  97.6 °F (36.4 °C)  98.4 °F (36.9 °C)   TempSrc:  Oral  Oral   SpO2: 96% 94% 97%    Weight:  79.9 kg (176 lb 2.4 oz)     Height:                24  0607 24  0600 24  0600   Weight: 79.8 kg (175 lb 14.8 oz) 79.9 kg (176 lb 2.4 oz) 79.9 kg (176 lb 2.4 oz)       Body mass index is 26.78 kg/m².                          Body mass index is 26.78 kg/m².    Intake/Output Summary (Last 24 hours) at 3/5/2024 0915  Last data filed at 3/5/2024 0600  Gross per 24 hour   Intake 910 ml   Output 2001 ml   Net -1091 ml         Exam:  GEN:  No distress, appears stated age  EYES:   Icteric sclera, PERRL  ENT:    External ears/nose normal, OP clear.    NECK:  No adenopathy, midline trachea  LUNGS: Normal chest on inspection, palpation and auscultation  CV:  Normal S1S2, without murmur  ABD:  Nontender, nondistended, no hepatosplenomegaly, +BS  EXT:  Nonpitting edema.  No cyanosis or clubbing.  No mottling and normal cap refill.    Assessment     Scheduled  meds:  hydrocortisone sodium succinate, 100 mg, Intravenous, Q8H  midodrine, 10 mg, Oral, BID AC  piperacillin-tazobactam, 4.5 g, Intravenous, Q8H  senna-docusate sodium, 2 tablet, Oral, BID  sodium chloride, 10 mL, Intravenous, Q12H      IV meds:                           Data Review:  Results from last 7 days   Lab Units 03/05/24 0323 03/04/24  0105 03/03/24  1450 03/03/24  0524 03/02/24  1823   SODIUM mmol/L 138 139 138 140 137   POTASSIUM mmol/L 4.0 3.5 3.6 3.4* 3.9   CHLORIDE mmol/L 104 103 102 101 104   CO2 mmol/L 28.0 25.8 25.0 23.4 17.6*   BUN mg/dL 27* 31* 33* 34* 34*   CREATININE mg/dL 1.23* 1.63* 1.81* 2.04* 2.45*   GLUCOSE mg/dL 115* 111* 110* 140* 145*   CALCIUM mg/dL 8.0* 7.5* 7.8* 6.8* 6.9*         Estimated Creatinine Clearance: 68.1 mL/min (A) (by C-G formula based on SCr of 1.23 mg/dL (H)).  Results from last 7 days   Lab Units 03/05/24 0323 03/04/24 0105 03/03/24 0524 03/02/24  0403 03/01/24  1612   WBC 10*3/mm3 4.57 3.77 3.18* 6.93 5.86   HEMOGLOBIN g/dL 9.3* 8.6* 8.3* 10.1* 11.5*   PLATELETS 10*3/mm3 57* 57* 54* 117* 122*     Results from last 7 days   Lab Units 03/04/24 0105 03/03/24 0524 03/02/24  0403   INR  1.25* 1.38* 2.05*     Results from last 7 days   Lab Units 03/05/24 0323 03/04/24 0105 03/03/24  0524 03/02/24  1823 03/02/24  0403   ALT (SGPT) U/L 53* 68* 69* 81* 114*   AST (SGOT) U/L 34* 43* 37* 50* 83*         Results from last 7 days   Lab Units 03/03/24  0802 03/02/24  1421 03/02/24  0602 03/02/24  0042 03/01/24  2117 03/01/24  1916 03/01/24  1612   PROCALCITONIN ng/mL  --   --   --  15.20*  --   --  16.90*   LACTATE mmol/L 2.2* 3.1* 4.6* 3.5* 4.5* 4.4* 6.1*         Glucose   Date/Time Value Ref Range Status   03/04/2024 2317 111 70 - 130 mg/dL Final   03/02/2024 1823 139 (H) 70 - 130 mg/dL Final         Imaging reviewed  Chest x-ray 3/2 reviewed        CT abdomen pelvis without contrast reviewed        Microbiology reviewed            Active Hospital Problems    Diagnosis   POA    **Sepsis [A41.9]  Yes      Resolved Hospital Problems   No resolved problems to display.         ASSESSMENT:  Septic shock  History of biliary atresia  Hyperbilirubinemia  Transaminitis  UTI  Lactic acidosis  SERGE  Met acidosis  Thrombocytopenia/anemia: Secondary to sepsis  Hyponatremia  Hypokalemia  Nausea vomiting emesis  Anorexia  Coagulopathy      PLAN:  Off all pressors greater than 24 hours.  Continue broad-spectrum antibiotics per ID recommendations.  Start to wean stress dose steroids.  MRCP   Mechanical DVT prophylaxis.    Discussed with family at bedside.    Discussed with multidisciplinary ICU team on rounds this morning.    Transfer out of ICU.        Pavan Merino MD  Pulmonary and Critical Care Medicine  [facility], [facility]  3/5/2024    09:15 EST

## 2024-03-05 NOTE — PROGRESS NOTES
ID note for septic shock  S:   Her breathing is improved.  Her swelling is improved.  She denies any significant pain.  Afebrile.  Tolerating antibiotics.    Physical Exam:   Vital Signs   Temp:  [97.6 °F (36.4 °C)-98.4 °F (36.9 °C)] 98.4 °F (36.9 °C)  Heart Rate:  [55-79] 63  Resp:  [14-16] 14  BP: (102-140)/(73-98) 123/80    GENERAL: Awake and alert, ill-appearing  HEENT: Oropharynx is clear. Hearing is grossly normal.   EYES: . No conjunctival injection. No lid lag.   LUNGS:normal respiratory effort.   SKIN: no cutaneous eruptions in exposed areas.  Jaundice  PSYCHIATRIC: Appropriate mood, affect, insight, and judgment.     Results Review:  white count 4.57, hemoglobin 9.3, platelets 57  Creatinine 1.23, alk phos 356, ALT 53, AST 34, total bilirubin 2.9      3/1 Bcx NGTD  3/1 strep A pcr neg  3/1 Ucx neg  3/1 MRSA PCR neg  3/1 RPP neg  3/1 Ucx neg         A/p  1.  Septic shock  2.  Biliary atresia  3.  Cirrhosis with portal hypertension  4.  Acute kidney injury  5.  Lactic acid acidosis    Suspected biliary sepsis  Cultures remain negative.  Her bilirubin and transaminases have improved, but her alk phos is little bit worse today.  She had an MRCP this morning we will follow-up the results of this  Cont zosyn

## 2024-03-05 NOTE — PLAN OF CARE
Goal Outcome Evaluation:      38 yo female admitted 3/1 with sepsis. Tx from ICU today. MRCP done today. IV steroids and IV zosyn. VS stable, room air, A&Ox4.

## 2024-03-05 NOTE — PROGRESS NOTES
[facility] Progress Note      Patient Name: Chantelle Vásquez  : 1985  MRN: [id number]  Primary Care Physician:  Shin Orr MD  Date of admission: 3/1/2024    Subjective     Interval History:   F/u SERGE    Patient is feeling much better, denies any chest pain, improved shortness of breath, no nausea or vomiting, no abdominal pain, no dysuria or gross hematuria, no lightheadedness    Review of Systems:   As noted above    Objective     Vitals:   Temp:  [97.6 °F (36.4 °C)-98.4 °F (36.9 °C)] 98.4 °F (36.9 °C)  Heart Rate:  [55-79] 61  Resp:  [14-16] 14  BP: (102-140)/(73-98) 112/76    Intake/Output Summary (Last 24 hours) at 3/5/2024 1137  Last data filed at 3/5/2024 0600  Gross per 24 hour   Intake 910 ml   Output 2001 ml   Net -1091 ml       Physical Exam:    General Appearance: Awake, alert, no acute distress  Skin: Warm and dry  Neck: RIJ TLC, no JVD  Lungs: Decreased breath sounds bilaterally, breathing effort not labored  Heart: RRR, no S3, no rub  Abdomen: soft, nontender, nondistended, normoactive bowel  : No palpable bladder  Extremities: Trace ankle edema  Neuro: normal speech and mental status     Scheduled Meds:     hydrocortisone sodium succinate, 50 mg, Intravenous, Q12H  midodrine, 10 mg, Oral, BID AC  piperacillin-tazobactam, 4.5 g, Intravenous, Q8H  senna-docusate sodium, 2 tablet, Oral, BID  sodium chloride, 10 mL, Intravenous, Q12H      IV Meds:          Results Reviewed:   I have personally reviewed the results from the time of this admission to 3/5/2024 11:37 EST     Results from last 7 days   Lab Units 24  0323 24  0105 24  1450 24  0524   SODIUM mmol/L 138 139 138 140   POTASSIUM mmol/L 4.0 3.5 3.6 3.4*   CHLORIDE mmol/L 104 103 102 101   CO2 mmol/L 28.0 25.8 25.0 23.4   BUN mg/dL 27* 31* 33* 34*   CREATININE mg/dL 1.23* 1.63* 1.81* 2.04*   CALCIUM mg/dL 8.0* 7.5* 7.8* 6.8*   BILIRUBIN mg/dL 2.9* 5.6*  --  5.5*   ALK PHOS U/L  356* 227*  --  116   ALT (SGPT) U/L 53* 68*  --  69*   AST (SGOT) U/L 34* 43*  --  37*   GLUCOSE mg/dL 115* 111* 110* 140*     Estimated Creatinine Clearance: 68.1 mL/min (A) (by C-G formula based on SCr of 1.23 mg/dL (H)).  Results from last 7 days   Lab Units 03/05/24 0323 03/04/24 0105 03/03/24 0524   MAGNESIUM mg/dL 2.1 2.7* 2.5   PHOSPHORUS mg/dL 2.1* 2.8 3.0     Results from last 7 days   Lab Units 03/05/24 0323   URIC ACID mg/dL 2.9     Results from last 7 days   Lab Units 03/05/24 0323 03/04/24 0105 03/03/24 0524 03/02/24  0403 03/01/24  1612   WBC 10*3/mm3 4.57 3.77 3.18* 6.93 5.86   HEMOGLOBIN g/dL 9.3* 8.6* 8.3* 10.1* 11.5*   PLATELETS 10*3/mm3 57* 57* 54* 117* 122*     Results from last 7 days   Lab Units 03/04/24 0105 03/03/24 0524 03/02/24 0403   INR  1.25* 1.38* 2.05*       Assessment / Plan     ASSESSMENT:  SERGE -associated with hypotension leading to degree of probable ATN associate with poor oral intake and sepsis.  Creatinine prerenal azotemia from vol depletion with n/v, poor oral intake, and sepsis, the creatinine down to 1.23, electrolyte within acceptable range, bilirubin 5.6, urinalysis on 3/1/2023 appears to have mild hematuria and positive protein and blood by dipstick I will recheck to see if it is still active or not.    Septic shock - ID suspect biliary source.  Off vasopressors,   Pancytopenia, WBCs today 4.57, platelets 57,000 and hemoglobin up to 9.3  Congenital biliary atresia + cirrhosis.  Followed by GI.  Hyperbilirubinemia + transaminitis  Coagulopathy, INR on 3/4/2024 was 1.25  Vitamin D deficiency, vitamin D level is 10 calcium is 8 but, near normal when corrected to albumin, patient will need to be on vitamin D supplement which she will consider after the acute phase of her ailment has resolved.  History of hypertension prior to admission treated with beta-blocker diagnosed approximately 11 years ago when she was pregnant at that time, currently blood pressure improved  requiring a small dose of midodrine will continue to monitor and decide when to wean it off.    PLAN:  Continue the same treatment, no need for diuretics at this time will monitor very closely  Surveillance labs    I reviewed the chart and other providers notes, I reviewed labs.  I discussed the case with the patient and she voiced good understanding  Copied text in this note has been reviewed and is accurate as of 03/05/24.         Aneudy Gould MD  03/05/24  11:37 [facility]    [facility]  [phone]

## 2024-03-05 NOTE — PROGRESS NOTES
Name: Chantelle Vásquez ADMIT: 3/1/2024   : 1985  PCP: Shin Orr MD    MRN: [id number] LOS: 4 days   AGE/SEX: 39 y.o. female  ROOM: [facility]/1     Subjective   Subjective     No events overnight. No complaints. She feels a lot better. Her MRI showed chronic findings and possibly some hepatitis       Objective   Objective   Vital Signs  Temp:  [97.6 °F (36.4 °C)-98.4 °F (36.9 °C)] 98.4 °F (36.9 °C)  Heart Rate:  [55-79] 61  Resp:  [14-16] 14  BP: (102-140)/(73-98) 112/76  SpO2:  [91 %-100 %] 99 %  on   ;   Device (Oxygen Therapy): room air  Body mass index is 26.78 kg/m².  Physical Exam  Constitutional:       General: She is not in acute distress.     Appearance: She is not toxic-appearing.   Cardiovascular:      Rate and Rhythm: Normal rate and regular rhythm.   Pulmonary:      Effort: Pulmonary effort is normal.      Breath sounds: Normal breath sounds.   Abdominal:      General: Bowel sounds are normal.      Palpations: Abdomen is soft.   Musculoskeletal:      Right lower leg: Edema present.      Left lower leg: Edema present.   Skin:     Coloration: Skin is jaundiced.   Neurological:      Mental Status: She is alert.   Psychiatric:         Mood and Affect: Mood normal.         Behavior: Behavior normal.         Results Review     I reviewed the patient's new clinical results.  Results from last 7 days   Lab Units 24  0323 03/04/24  0105 24  0524 24  0403   WBC 10*3/mm3 4.57 3.77 3.18* 6.93   HEMOGLOBIN g/dL 9.3* 8.6* 8.3* 10.1*   PLATELETS 10*3/mm3 57* 57* 54* 117*     Results from last 7 days   Lab Units 24  0323 24  0105 24  1450 24  0524   SODIUM mmol/L 138 139 138 140   POTASSIUM mmol/L 4.0 3.5 3.6 3.4*   CHLORIDE mmol/L 104 103 102 101   CO2 mmol/L 28.0 25.8 25.0 23.4   BUN mg/dL 27* 31* 33* 34*   CREATININE mg/dL 1.23* 1.63* 1.81* 2.04*   GLUCOSE mg/dL 115* 111* 110* 140*   Estimated Creatinine Clearance: 68.1 mL/min (A) (by C-G formula based on SCr of  1.23 mg/dL (H)).  Results from last 7 days   Lab Units 03/05/24  0323 03/04/24  0105 03/03/24  0524 03/02/24  1823   ALBUMIN g/dL 3.2* 3.1* 3.3* 3.3*   BILIRUBIN mg/dL 2.9* 5.6* 5.5* 5.5*   ALK PHOS U/L 356* 227* 116 129*   AST (SGOT) U/L 34* 43* 37* 50*   ALT (SGPT) U/L 53* 68* 69* 81*     Results from last 7 days   Lab Units 03/05/24  0323 03/04/24  0105 03/03/24  1450 03/03/24  0524 03/02/24  1823   CALCIUM mg/dL 8.0* 7.5* 7.8* 6.8* 6.9*   ALBUMIN g/dL 3.2* 3.1*  --  3.3* 3.3*   MAGNESIUM mg/dL 2.1 2.7*  --  2.5 2.3   PHOSPHORUS mg/dL 2.1* 2.8  --  3.0  --      Results from last 7 days   Lab Units 03/03/24  0802 03/02/24  1421 03/02/24  0602 03/02/24  0042 03/01/24  1916 03/01/24  1612   PROCALCITONIN ng/mL  --   --   --  15.20*  --  16.90*   LACTATE mmol/L 2.2* 3.1* 4.6* 3.5*   < > 6.1*    < > = values in this interval not displayed.     COVID19   Date Value Ref Range Status   03/01/2024 Not Detected Not Detected - Ref. Range Final     SARS-CoV-2 PCR   Date Value Ref Range Status   12/18/2020 Not Detected Not Detected Final     Comment:     Nucleic acid specific to SARS-CoV-2 (COVID-19) virus was not detected in  this sample by the Aptima (R) SARS-CoV-2 Assay.                SARS-CoV-2 (COVID-19) nucleic acid testing performed using [employer] Aptima (R) SARS-CoV-2 Assay or Alive JuicesPath (TM)  COVID-19 Combo Kit as indicated above under Emergency Use Authorization (EUA) from the FDA. Aptima (R) and TaqPath (TM) test performance  characteristics verified by [facility] in accordance with the FDAs Guidance Document (Policy for Diagnostic Tests for Coronavirus Disease-2019  during the Public Health Emergency) issued on March 16, 2020. The laboratory is regulated under CLIA as qualified to perform high-complexity testing  Unless otherwise noted, all testing was performed at [facility], CLIA No. [id number], [place] State Licensee No. [id number]     Glucose   Date/Time Value Ref Range Status   03/04/2024  2317 111 70 - 130 mg/dL Final   03/02/2024 1823 139 (H) 70 - 130 mg/dL Final       XR Chest 1 View  Narrative: XR CHEST 1 VW-        INDICATION: Central venous catheter placement     COMPARISON: Chest radiograph March 1, 2024     TECHNIQUE: 1 view chest     FINDINGS:      Small bibasilar opacities. No effusions. Normal mediastinal contour.  Heart is normal in size. Right IJ catheter tip is over the mid SVC.     Impression:    1. Right IJ catheter tip is in the mid SVC. No pneumothorax.  2. Small amount of bibasilar subsegmental atelectasis     This report was finalized on 3/2/2024 1:44 PM by Dr. Dave Dixon M.D  on Workstation: [id number]       Scheduled Medications  hydrocortisone sodium succinate, 50 mg, Intravenous, Q12H  midodrine, 10 mg, Oral, BID AC  piperacillin-tazobactam, 4.5 g, Intravenous, Q8H  senna-docusate sodium, 2 tablet, Oral, BID  sodium chloride, 10 mL, Intravenous, Q12H    Infusions   Diet  Diet: Gastrointestinal; Fat-Restricted; Fluid Consistency: Thin (IDDSI 0)       Assessment/Plan     Active Hospital Problems    Diagnosis  POA    **Sepsis [A41.9]  Yes    Biliary atresia [Q44.2]  Not Applicable    SERGE (acute kidney injury) [N17.9]  Yes    Hyperbilirubinemia [E80.6]  Yes    Cirrhosis of liver [K74.60]  Yes    Pancytopenia [D61.818]  Yes    Elevated LFTs [R79.89]  Yes    Portal venous hypertension [K76.6]  Yes      Resolved Hospital Problems   No resolved problems to display.       39 y.o. female admitted with Sepsis.    Septic shock-source of infection not clearly identified at the moment. With her history of biliary atresia, a biliary source was suspected and an MRCP was performed today which showed chronic changes and evidence of hepatitis. Currently on zosyn per ID. Pulmonology is weaning steroids  SERGE-improved with midodrine/IVF/vasopressors/treatment of sepsis.   Hyperbilirubinemia/elevated LFTs-possibly related to the above. Improving  Pancytopenia-due to the above and cirrhosis.  Hgb stable at 9.3. plt's stable at 57. Wbc is normal.   Biliary atresia s/p Kasai procedure remotely leading to cirrhosis with portal hypertension and varices-nadalol held acutely given septic shock  SCDs for DVT prophylaxis.  Full code.  Discussed with patient.  Anticipate discharge home timing yet to be determined.      Louis Mendez MD  [facility]  03/05/24  10:22 EST    I wore protective equipment throughout this patient encounter including a face mask, gloves and protective eyewear.  Hand hygiene was performed before donning protective equipment and after removal when leaving the room.

## 2024-03-06 LAB
ALBUMIN SERPL-MCNC: 2.8 G/DL (ref 3.5–5.2)
ALBUMIN/GLOB SERPL: 1.6 G/DL
ALP SERPL-CCNC: 275 U/L (ref 39–117)
ALT SERPL W P-5'-P-CCNC: 46 U/L (ref 1–33)
ANION GAP SERPL CALCULATED.3IONS-SCNC: 8.5 MMOL/L (ref 5–15)
AST SERPL-CCNC: 29 U/L (ref 1–32)
BACTERIA SPEC AEROBE CULT: NORMAL
BACTERIA SPEC AEROBE CULT: NORMAL
BILIRUB SERPL-MCNC: 2.2 MG/DL (ref 0–1.2)
BUN SERPL-MCNC: 21 MG/DL (ref 6–20)
BUN/CREAT SERPL: 24.7 (ref 7–25)
CALCIUM SPEC-SCNC: 7.5 MG/DL (ref 8.6–10.5)
CHLORIDE SERPL-SCNC: 103 MMOL/L (ref 98–107)
CO2 SERPL-SCNC: 25.5 MMOL/L (ref 22–29)
CREAT SERPL-MCNC: 0.85 MG/DL (ref 0.57–1)
DEPRECATED RDW RBC AUTO: 38.1 FL (ref 37–54)
EGFRCR SERPLBLD CKD-EPI 2021: 89.5 ML/MIN/1.73
ERYTHROCYTE [DISTWIDTH] IN BLOOD BY AUTOMATED COUNT: 12.2 % (ref 12.3–15.4)
GLOBULIN UR ELPH-MCNC: 1.7 GM/DL
GLUCOSE SERPL-MCNC: 88 MG/DL (ref 65–99)
HCT VFR BLD AUTO: 28.5 % (ref 34–46.6)
HGB BLD-MCNC: 9.4 G/DL (ref 12–15.9)
LYMPHOCYTES # BLD MANUAL: 0.5 10*3/MM3 (ref 0.7–3.1)
LYMPHOCYTES NFR BLD MANUAL: 6.1 % (ref 5–12)
MAGNESIUM SERPL-MCNC: 2.1 MG/DL (ref 1.6–2.6)
MCH RBC QN AUTO: 28.3 PG (ref 26.6–33)
MCHC RBC AUTO-ENTMCNC: 33 G/DL (ref 31.5–35.7)
MCV RBC AUTO: 85.8 FL (ref 79–97)
METAMYELOCYTES NFR BLD MANUAL: 2 % (ref 0–0)
MONOCYTES # BLD: 0.23 10*3/MM3 (ref 0.1–0.9)
MYELOCYTES NFR BLD MANUAL: 1 % (ref 0–0)
NEUTROPHILS # BLD AUTO: 2.91 10*3/MM3 (ref 1.7–7)
NEUTROPHILS NFR BLD MANUAL: 77.6 % (ref 42.7–76)
PHOSPHATE SERPL-MCNC: 2.3 MG/DL (ref 2.5–4.5)
PLAT MORPH BLD: NORMAL
PLATELET # BLD AUTO: 56 10*3/MM3 (ref 140–450)
PMV BLD AUTO: 11.1 FL (ref 6–12)
POTASSIUM SERPL-SCNC: 3.4 MMOL/L (ref 3.5–5.2)
POTASSIUM SERPL-SCNC: 3.7 MMOL/L (ref 3.5–5.2)
PROT SERPL-MCNC: 4.5 G/DL (ref 6–8.5)
RBC # BLD AUTO: 3.32 10*6/MM3 (ref 3.77–5.28)
RBC MORPH BLD: NORMAL
SODIUM SERPL-SCNC: 137 MMOL/L (ref 136–145)
URATE SERPL-MCNC: 2.4 MG/DL (ref 2.4–5.7)
VARIANT LYMPHS NFR BLD MANUAL: 13.3 % (ref 19.6–45.3)
WBC MORPH BLD: NORMAL
WBC NRBC COR # BLD AUTO: 3.75 10*3/MM3 (ref 3.4–10.8)

## 2024-03-06 PROCEDURE — 99232 SBSQ HOSP IP/OBS MODERATE 35: CPT | Performed by: INTERNAL MEDICINE

## 2024-03-06 PROCEDURE — 84132 ASSAY OF SERUM POTASSIUM: CPT | Performed by: STUDENT IN AN ORGANIZED HEALTH CARE EDUCATION/TRAINING PROGRAM

## 2024-03-06 PROCEDURE — 85025 COMPLETE CBC W/AUTO DIFF WBC: CPT | Performed by: INTERNAL MEDICINE

## 2024-03-06 PROCEDURE — 84550 ASSAY OF BLOOD/URIC ACID: CPT | Performed by: INTERNAL MEDICINE

## 2024-03-06 PROCEDURE — 83735 ASSAY OF MAGNESIUM: CPT | Performed by: INTERNAL MEDICINE

## 2024-03-06 PROCEDURE — 25010000002 HYDROCORTISONE SOD SUC (PF) 100 MG RECONSTITUTED SOLUTION: Performed by: INTERNAL MEDICINE

## 2024-03-06 PROCEDURE — 25010000002 PIPERACILLIN SOD-TAZOBACTAM PER 1 G: Performed by: INTERNAL MEDICINE

## 2024-03-06 PROCEDURE — 84100 ASSAY OF PHOSPHORUS: CPT | Performed by: INTERNAL MEDICINE

## 2024-03-06 PROCEDURE — 25010000002 PIPERACILLIN SOD-TAZOBACTAM PER 1 G: Performed by: STUDENT IN AN ORGANIZED HEALTH CARE EDUCATION/TRAINING PROGRAM

## 2024-03-06 PROCEDURE — 80053 COMPREHEN METABOLIC PANEL: CPT | Performed by: INTERNAL MEDICINE

## 2024-03-06 PROCEDURE — 85007 BL SMEAR W/DIFF WBC COUNT: CPT | Performed by: INTERNAL MEDICINE

## 2024-03-06 PROCEDURE — 99232 SBSQ HOSP IP/OBS MODERATE 35: CPT | Performed by: PHYSICIAN ASSISTANT

## 2024-03-06 RX ORDER — PANTOPRAZOLE SODIUM 40 MG/1
40 TABLET, DELAYED RELEASE ORAL
Status: CANCELLED | OUTPATIENT
Start: 2024-03-06

## 2024-03-06 RX ORDER — POTASSIUM CHLORIDE 750 MG/1
40 TABLET, FILM COATED, EXTENDED RELEASE ORAL ONCE
Status: DISCONTINUED | OUTPATIENT
Start: 2024-03-06 | End: 2024-03-08 | Stop reason: HOSPADM

## 2024-03-06 RX ORDER — POTASSIUM CHLORIDE 750 MG/1
40 TABLET, FILM COATED, EXTENDED RELEASE ORAL EVERY 4 HOURS
Status: COMPLETED | OUTPATIENT
Start: 2024-03-06 | End: 2024-03-06

## 2024-03-06 RX ADMIN — Medication 10 ML: at 08:21

## 2024-03-06 RX ADMIN — MIDODRINE HYDROCHLORIDE 10 MG: 5 TABLET ORAL at 16:54

## 2024-03-06 RX ADMIN — Medication 10 ML: at 22:30

## 2024-03-06 RX ADMIN — PIPERACILLIN SODIUM AND TAZOBACTAM SODIUM 4.5 G: 4; .5 INJECTION, SOLUTION INTRAVENOUS at 13:09

## 2024-03-06 RX ADMIN — HYDROCORTISONE SODIUM SUCCINATE 25 MG: 100 INJECTION, POWDER, FOR SOLUTION INTRAMUSCULAR; INTRAVENOUS at 13:09

## 2024-03-06 RX ADMIN — PIPERACILLIN SODIUM AND TAZOBACTAM SODIUM 4.5 G: 4; .5 INJECTION, SOLUTION INTRAVENOUS at 07:29

## 2024-03-06 RX ADMIN — HYDROCORTISONE SODIUM SUCCINATE 50 MG: 100 INJECTION, POWDER, FOR SOLUTION INTRAMUSCULAR; INTRAVENOUS at 01:42

## 2024-03-06 RX ADMIN — POTASSIUM CHLORIDE 40 MEQ: 750 TABLET, EXTENDED RELEASE ORAL at 11:17

## 2024-03-06 RX ADMIN — POTASSIUM CHLORIDE 40 MEQ: 750 TABLET, EXTENDED RELEASE ORAL at 08:19

## 2024-03-06 RX ADMIN — PIPERACILLIN SODIUM AND TAZOBACTAM SODIUM 4.5 G: 4; .5 INJECTION, SOLUTION INTRAVENOUS at 22:30

## 2024-03-06 RX ADMIN — MIDODRINE HYDROCHLORIDE 10 MG: 5 TABLET ORAL at 06:58

## 2024-03-06 RX ADMIN — HYDROCORTISONE SODIUM SUCCINATE 25 MG: 100 INJECTION, POWDER, FOR SOLUTION INTRAMUSCULAR; INTRAVENOUS at 23:37

## 2024-03-06 NOTE — PROGRESS NOTES
[facility]  Inpatient Progress Note    Reason for Follow Up:  Cirrhosis, sepsis    Subjective     Interval History:   Pt report she is doing okay. Transferred out of ICU.   No abd pain, nausea/vomiting, melena.    at bedside who reports pt had burning epigastric pain that started a month ago. Has noticed intermittent pain since then in epigastric area. She has tried pepcid w/o significant relief.       Current Facility-Administered Medications:     acetaminophen (TYLENOL) tablet 650 mg, 650 mg, Oral, Q8H PRN **OR** acetaminophen (TYLENOL) suppository 650 mg, 650 mg, Rectal, Q8H PRN, Pavan Merino MD    sennosides-docusate (PERICOLACE) 8.6-50 MG per tablet 2 tablet, 2 tablet, Oral, BID, 2 tablet at 03/05/24 2032 **AND** polyethylene glycol (MIRALAX) packet 17 g, 17 g, Oral, Daily PRN **AND** bisacodyl (DULCOLAX) EC tablet 5 mg, 5 mg, Oral, Daily PRN **AND** bisacodyl (DULCOLAX) suppository 10 mg, 10 mg, Rectal, Daily PRN, Pavan Merino MD    Calcium Replacement - Follow Nurse / BPA Driven Protocol, , Does not apply, PRN, Pavan Merino MD    Hydrocortisone Sod Suc (PF) (Solu-CORTEF) injection 25 mg, 25 mg, Intravenous, Q12H, Pavan Merino MD    Magnesium Standard Dose Replacement - Follow Nurse / BPA Driven Protocol, , Does not apply, PRN, Pavan Mreino MD    midodrine (PROAMATINE) tablet 10 mg, 10 mg, Oral, BID AC, Pavan Merino MD, 10 mg at 03/06/24 0658    nitroglycerin (NITROSTAT) SL tablet 0.4 mg, 0.4 mg, Sublingual, Q5 Min PRN, Pavan Merino MD    ondansetron (ZOFRAN) injection 4 mg, 4 mg, Intravenous, Q6H PRN, Pavan Merino MD    piperacillin-tazobactam (ZOSYN) 4.5 g in iso-osmotic dextrose 100 mL IVPB (premix), 4.5 g, Intravenous, Q8H, Pavan Merion MD, 4.5 g at 03/06/24 0729    potassium chloride (K-DUR,KLOR-CON) ER tablet 40 mEq, 40 mEq, Oral, Once, Aneudy Gould MD    Potassium Replacement - Follow Nurse /  BPA Driven Protocol, , Does not apply, PRN, Pavan Merino MD    sodium chloride 0.9 % flush 10 mL, 10 mL, Intravenous, Q12H, Pavan Merino MD, 10 mL at 03/06/24 0821    sodium chloride 0.9 % flush 10 mL, 10 mL, Intravenous, PRN, Pavan Merino MD    sodium chloride 0.9 % infusion 40 mL, 40 mL, Intravenous, Angelique RODRIGUEZ Mark Edwin, MD        Objective     Vital Signs  Temp:  [97.5 °F (36.4 °C)-98.4 °F (36.9 °C)] 98.4 °F (36.9 °C)  Heart Rate:  [68-82] 82  Resp:  [16] 16  BP: ()/(61-98) 133/98  Body mass index is 26.78 kg/m².    Intake/Output Summary (Last 24 hours) at 3/6/2024 1231  Last data filed at 3/5/2024 1419  Gross per 24 hour   Intake 240 ml   Output 300 ml   Net -60 ml     No intake/output data recorded.     Physical Exam:   General: patient awake, alert and cooperative   Eyes: Normal lids and lashes, no scleral icterus   Abdomen: soft, nontender, nondistended; normal bowel sounds      Results Review:     I reviewed the patient's new clinical results.    Results from last 7 days   Lab Units 03/06/24  0439 03/05/24  0323 03/04/24  0105   WBC 10*3/mm3 3.75 4.57 3.77   HEMOGLOBIN g/dL 9.4* 9.3* 8.6*   HEMATOCRIT % 28.5* 28.1* 26.7*   PLATELETS 10*3/mm3 56* 57* 57*     Results from last 7 days   Lab Units 03/06/24  0439 03/05/24  0323 03/04/24  0105   SODIUM mmol/L 137 138 139   POTASSIUM mmol/L 3.4* 4.0 3.5   CHLORIDE mmol/L 103 104 103   CO2 mmol/L 25.5 28.0 25.8   BUN mg/dL 21* 27* 31*   CREATININE mg/dL 0.85 1.23* 1.63*   CALCIUM mg/dL 7.5* 8.0* 7.5*   BILIRUBIN mg/dL 2.2* 2.9* 5.6*   ALK PHOS U/L 275* 356* 227*   ALT (SGPT) U/L 46* 53* 68*   AST (SGOT) U/L 29 34* 43*   GLUCOSE mg/dL 88 115* 111*     Results from last 7 days   Lab Units 03/04/24  0105 03/03/24  0524 03/02/24  0403   INR  1.25* 1.38* 2.05*     Lab Results   Lab Value Date/Time    LIPASE 29 03/01/2024 1612    LIPASE 49 02/03/2024 0642       Radiology:  MRI abdomen w wo contrast mrcp   Final Result      XR Chest 1  View   Final Result       1. Right IJ catheter tip is in the mid SVC. No pneumothorax.   2. Small amount of bibasilar subsegmental atelectasis       This report was finalized on 3/2/2024 1:44 PM by Dr. Dave Dixon M.D   on Workstation: [id number]          CT Abdomen Pelvis Without Contrast   Final Result       1. Cirrhosis with portal hypertension (splenomegaly, portosystemic   collateral vessels and small amount of ascites).   2. Steatosis seen in the left hepatic lobe.       This report was finalized on 3/1/2024 5:17 PM by Dr. Dave Dixon M.D   on Workstation: [id number]          XR Chest 1 View   Final Result          Assessment & Plan     Active Hospital Problems    Diagnosis     **Sepsis     Biliary atresia     SERGE (acute kidney injury)     Hyperbilirubinemia     Cirrhosis of liver     Pancytopenia     Elevated LFTs     Portal venous hypertension          Assessment:   Septic shock  Biliary atresia- bilirubin has improved last two days  Cirrhosis w/ hx of varices   MRCP w/o any biliary obstruction. Cirrhosis w/ portosystemic shunting, small scites, severe splenomegaly, large varices. Progressive enhancement of the liver capsule- possibly related to hepatitis.   Minimal ascites    Plan:   Trial of PPI given intermittent burning epigastric pain for last month.  EGD as scheduled already next month.   Nadolol currently on hold due to septic shock/hypotension. Recommend restarting when vitals improve.   Appreciate ID recommendations- who suspects biliary sepsis and recommends augmentin upon discharge   Pt would benefit from evaluation at a transplant center, will refer patient to [facility].   Otherwise, no further recs from GI. We will s/o. Please call us with any questions or concerns.     I discussed the patients findings and my recommendations with patient.         Tom Dc PA-C  [facility]  [address] [place] [address] [address]  [place] [address]  Office: [phone]

## 2024-03-06 NOTE — PROGRESS NOTES
ID note for septic shock  S:   Feels well.  Would like to go home.    Physical Exam:   Vital Signs   Temp:  [97.5 °F (36.4 °C)-98.4 °F (36.9 °C)] 98.4 °F (36.9 °C)  Heart Rate:  [66-82] 82  Resp:  [12-16] 16  BP: ()/(61-98) 133/98    GENERAL: Awake and alert, ill-appearing  HEENT: Oropharynx is clear. Hearing is grossly normal.   EYES: . No conjunctival injection. No lid lag.   LUNGS:normal respiratory effort.   SKIN: no cutaneous eruptions in exposed areas.  Jaundice  PSYCHIATRIC: Appropriate mood, affect, insight, and judgment.     Results Review:  white count 3.75, hemoglobin 9.4, platelets 56  Creatinine 0.85, alk phos 275, ALT 46, total bilirubin 2.2      3/1 Bcx NGTD  3/1 strep A pcr neg  3/1 Ucx neg  3/1 MRSA PCR neg  3/1 RPP neg  3/1 Ucx neg         A/p  1.  Septic shock  2.  Biliary atresia  3.  Cirrhosis with portal hypertension  4.  Acute kidney injury  5.  Lactic acid acidosis    Suspected biliary sepsis  Cultures remain negative.  LFTs improved.  MRCP with portal hypertension and cirrhotic changes and ascites but no evidence of biliary obstruction  Cont zosyn while inpatient.    No objection to discharge on Augmentin 875/125 p.o. every 12 x 3 days.

## 2024-03-06 NOTE — PROGRESS NOTES
[facility] Progress Note      Patient Name: Chantelle Vásquez  : 1985  MRN: [id number]  Primary Care Physician:  Shin Orr MD  Date of admission: 3/1/2024    Subjective     Interval History:   F/u SERGE    Patient is feeling much better, denies any chest pain, improved shortness of breath, no nausea or vomiting, no abdominal pain, no dysuria or gross hematuria, no lightheadedness    Review of Systems:   As noted above    Objective     Vitals:   Temp:  [97.5 °F (36.4 °C)-98.4 °F (36.9 °C)] 98.4 °F (36.9 °C)  Heart Rate:  [61-82] 82  Resp:  [12-16] 16  BP: ()/(61-98) 133/98    Intake/Output Summary (Last 24 hours) at 3/6/2024 0816  Last data filed at 3/5/2024 1419  Gross per 24 hour   Intake 240 ml   Output 300 ml   Net -60 ml       Physical Exam:    General Appearance: Awake, alert, no acute distress  Skin: Warm and dry  Neck: RIJ TLC, no JVD  Lungs: Decreased breath sounds bilaterally, breathing effort not labored  Heart: RRR, no S3, no rub  Abdomen: soft, nontender, nondistended, normoactive bowel  : No palpable bladder  Extremities: No ankle edema  Neuro: normal speech and mental status     Scheduled Meds:     hydrocortisone sodium succinate, 25 mg, Intravenous, Q12H  midodrine, 10 mg, Oral, BID AC  piperacillin-tazobactam, 4.5 g, Intravenous, Q8H  potassium chloride ER, 40 mEq, Oral, Q4H  senna-docusate sodium, 2 tablet, Oral, BID  sodium chloride, 10 mL, Intravenous, Q12H      IV Meds:          Results Reviewed:   I have personally reviewed the results from the time of this admission to 3/6/2024 08:16 EST     Results from last 7 days   Lab Units 24  0439 24  0323 24  0105   SODIUM mmol/L 137 138 139   POTASSIUM mmol/L 3.4* 4.0 3.5   CHLORIDE mmol/L 103 104 103   CO2 mmol/L 25.5 28.0 25.8   BUN mg/dL 21* 27* 31*   CREATININE mg/dL 0.85 1.23* 1.63*   CALCIUM mg/dL 7.5* 8.0* 7.5*   BILIRUBIN mg/dL 2.2* 2.9* 5.6*   ALK PHOS U/L 275* 356* 227*   ALT  (SGPT) U/L 46* 53* 68*   AST (SGOT) U/L 29 34* 43*   GLUCOSE mg/dL 88 115* 111*     Estimated Creatinine Clearance: 98.6 mL/min (by C-G formula based on SCr of 0.85 mg/dL).  Results from last 7 days   Lab Units 03/06/24 0439 03/05/24 0323 03/04/24  0105   MAGNESIUM mg/dL 2.1 2.1 2.7*   PHOSPHORUS mg/dL 2.3* 2.1* 2.8     Results from last 7 days   Lab Units 03/06/24 0439 03/05/24  0323   URIC ACID mg/dL 2.4 2.9     Results from last 7 days   Lab Units 03/06/24 0439 03/05/24 0323 03/04/24 0105 03/03/24 0524 03/02/24  0403   WBC 10*3/mm3 3.75 4.57 3.77 3.18* 6.93   HEMOGLOBIN g/dL 9.4* 9.3* 8.6* 8.3* 10.1*   PLATELETS 10*3/mm3 56* 57* 57* 54* 117*     Results from last 7 days   Lab Units 03/04/24 0105 03/03/24 0524 03/02/24  0403   INR  1.25* 1.38* 2.05*       Assessment / Plan     ASSESSMENT:  SERGE -associated with hypotension leading to degree of probable ATN associate with poor oral intake and sepsis.  Creatinine prerenal azotemia from vol depletion with n/v, poor oral intake, and sepsis, the creatinine down to 0.85, electrolyte within acceptable range, bilirubin down to 2.2, urinalysis on 3/1/2023 appears to have mild hematuria and positive protein and blood by dipstick, most likely associate with acute kidney injury her urine is clear will need a urinalysis after discharge and if there is still activity will consider further evaluation otherwise no further renal care will be needed.  Septic shock - ID suspect biliary source.  Off vasopressors,   Pancytopenia, WBCs today 3.75, platelets 56,000 and hemoglobin up to 9.4  Congenital biliary atresia + cirrhosis.  Followed by GI.  Hyperbilirubinemia + transaminitis  Coagulopathy, INR on 3/4/2024 was 1.25  Vitamin D deficiency, vitamin D level is 10 calcium is 7.5 but, near normal when corrected to albumin, patient will need to be on vitamin D supplement which she will consider after the acute phase of her ailment has resolved.  History of hypertension prior to  admission treated with beta-blocker diagnosed approximately 11 years ago when she was pregnant at that time, currently blood pressure improved requiring a small dose of midodrine will continue to monitor and decide when to wean it off.    PLAN:  Continue the same treatment  The patient needs urinalysis done by her PCP after discharge and we will be happy to see her if the UA remains abnormal  Replete potassium  Nothing else to add from the renal standpoint, I will sign off and will be happy to see the patient again if needed.    I reviewed the chart and other providers notes, I reviewed labs.  I discussed the case with the patient and she voiced good understanding  Copied text in this note has been reviewed and is accurate as of 03/06/24.         Aneudy Gould MD  03/06/24  08:16 [facility]    [facility]  [phone]

## 2024-03-06 NOTE — CASE MANAGEMENT/SOCIAL WORK
Continued Stay Note  [facility]     Patient Name: Chantelle Vásquez  MRN: [id number]  Today's Date: 3/6/2024    Admit Date: 3/1/2024    Plan: Plan home with spouse.  BRANDON Sanchez RN   Discharge Plan       Row Name 03/06/24 1247       Plan    Plan Plan home with spouse.  BRANDON Sanchez RN    Patient/Family in Agreement with Plan yes    Plan Comments Spoke with pt at bedside.  Pt denies any discharge needs.  Pt states her spouse will assist her at home and transport her home.  Plan home with spouse.  BRANDON Sanchez RN                   Discharge Codes    No documentation.                 Expected Discharge Date and Time       Expected Discharge Date Expected Discharge Time    Mar 8, 2024               Aurea Sanchez RN

## 2024-03-06 NOTE — PLAN OF CARE
Goal Outcome Evaluation:      40 yo female admitted 3/1 with sepsis. Weaning IV steroids, continues on IV zosyn with plan to switch to oral abx at discharge. No acute complaints. VS stable, room air, A&Ox4. Possible discharge tomorrow.

## 2024-03-06 NOTE — PLAN OF CARE
Goal Outcome Evaluation:         Patient resting abed and without any c/o pain or discomfort at this time. She is tolerating her antibiotics and IV fluids well. She is pleasant, up to the bathroom ad mukesh, voiding well and independent with her ADL's. VSS, no c/o N/V/D during the night.                                       Partial Purse String (Simple) Text: Given the location of the defect and the characteristics of the surrounding skin a simple purse string closure was deemed most appropriate.  Undermining was performed circumferentially around the surgical defect.  A purse string suture was then placed and tightened. Wound tension of the circular defect prevented complete closure of the wound.

## 2024-03-06 NOTE — PROGRESS NOTES
[facility] INPATIENT PROGRESS NOTE         [address]    3/6/2024      PATIENT IDENTIFICATION:  Name: Chantelle Vásquez ADMIT: 3/1/2024   : 1985  PCP: Shin Orr MD    MRN: [id number] LOS: 5 days   AGE/SEX: 39 y.o. female  ROOM: [facility]                     LOS 5    Reason for visit: Septic shock      SUBJECTIVE:      Resting comfortably.  On room air.    Objective   OBJECTIVE:    Vital Sign Min/Max for last 24 hours  Temp  Min: 97.5 °F (36.4 °C)  Max: 98.2 °F (36.8 °C)   BP  Min: 93/67  Max: 132/92   Pulse  Min: 61  Max: 71   Resp  Min: 12  Max: 16   SpO2  Min: 92 %  Max: 99 %   No data recorded   No data recorded    Vitals:    24 1330 24 1543 24 1927 24 2328   BP: 100/72 107/77 132/92 111/77   BP Location:  Right arm Left arm Left arm   Patient Position:  Sitting Lying Lying   Pulse: 69      Resp:  16 16 16   Temp:  97.9 °F (36.6 °C) 98.2 °F (36.8 °C) 97.5 °F (36.4 °C)   TempSrc:  Oral Oral Oral   SpO2: 96%      Weight:       Height:                24  0607 24  0600 24  0600   Weight: 79.8 kg (175 lb 14.8 oz) 79.9 kg (176 lb 2.4 oz) 79.9 kg (176 lb 2.4 oz)       Body mass index is 26.78 kg/m².                          Body mass index is 26.78 kg/m².    Intake/Output Summary (Last 24 hours) at 3/6/2024 0731  Last data filed at 3/5/2024 1419  Gross per 24 hour   Intake 240 ml   Output 300 ml   Net -60 ml         Exam:  GEN:  No distress, appears stated age  EYES:   Icteric sclera, PERRL  ENT:    External ears/nose normal, OP clear.    NECK:  No adenopathy, midline trachea  LUNGS: Normal chest on inspection, palpation and auscultation  CV:  Normal S1S2, without murmur  ABD:  Nontender, nondistended, no hepatosplenomegaly, +BS  EXT:  Nonpitting edema.  No cyanosis or clubbing.  No mottling and normal cap refill.    Assessment     Scheduled meds:  hydrocortisone sodium succinate, 50 mg, Intravenous, Q12H  midodrine, 10 mg, Oral, BID  AC  piperacillin-tazobactam, 4.5 g, Intravenous, Q8H  potassium chloride ER, 40 mEq, Oral, Q4H  senna-docusate sodium, 2 tablet, Oral, BID  sodium chloride, 10 mL, Intravenous, Q12H      IV meds:                           Data Review:  Results from last 7 days   Lab Units 03/06/24 0439 03/05/24  0323 03/04/24  0105 03/03/24  1450 03/03/24  0524   SODIUM mmol/L 137 138 139 138 140   POTASSIUM mmol/L 3.4* 4.0 3.5 3.6 3.4*   CHLORIDE mmol/L 103 104 103 102 101   CO2 mmol/L 25.5 28.0 25.8 25.0 23.4   BUN mg/dL 21* 27* 31* 33* 34*   CREATININE mg/dL 0.85 1.23* 1.63* 1.81* 2.04*   GLUCOSE mg/dL 88 115* 111* 110* 140*   CALCIUM mg/dL 7.5* 8.0* 7.5* 7.8* 6.8*         Estimated Creatinine Clearance: 98.6 mL/min (by C-G formula based on SCr of 0.85 mg/dL).  Results from last 7 days   Lab Units 03/06/24 0439 03/05/24 0323 03/04/24 0105 03/03/24 0524 03/02/24  0403   WBC 10*3/mm3 3.75 4.57 3.77 3.18* 6.93   HEMOGLOBIN g/dL 9.4* 9.3* 8.6* 8.3* 10.1*   PLATELETS 10*3/mm3 56* 57* 57* 54* 117*     Results from last 7 days   Lab Units 03/04/24 0105 03/03/24  0524 03/02/24  0403   INR  1.25* 1.38* 2.05*     Results from last 7 days   Lab Units 03/06/24  0439 03/05/24 0323 03/04/24 0105 03/03/24  0524 03/02/24  1823   ALT (SGPT) U/L 46* 53* 68* 69* 81*   AST (SGOT) U/L 29 34* 43* 37* 50*         Results from last 7 days   Lab Units 03/03/24  0802 03/02/24  1421 03/02/24  0602 03/02/24  0042 03/01/24  2117 03/01/24  1916 03/01/24  1612   PROCALCITONIN ng/mL  --   --   --  15.20*  --   --  16.90*   LACTATE mmol/L 2.2* 3.1* 4.6* 3.5* 4.5* 4.4* 6.1*         Glucose   Date/Time Value Ref Range Status   03/04/2024 2317 111 70 - 130 mg/dL Final         Imaging reviewed  Chest x-ray 3/2 reviewed        CT abdomen pelvis without contrast reviewed        Microbiology reviewed            Active Hospital Problems    Diagnosis  POA    **Sepsis [A41.9]  Yes    Biliary atresia [Q44.2]  Not Applicable    SERGE (acute kidney injury) [N17.9]  Yes     Hyperbilirubinemia [E80.6]  Yes    Cirrhosis of liver [K74.60]  Yes    Pancytopenia [D61.818]  Yes    Elevated LFTs [R79.89]  Yes    Portal venous hypertension [K76.6]  Yes      Resolved Hospital Problems   No resolved problems to display.         ASSESSMENT:  Septic shock  History of biliary atresia  Hyperbilirubinemia  Transaminitis  UTI  Lactic acidosis  SERGE  Met acidosis  Thrombocytopenia/anemia: Secondary to sepsis  Hyponatremia  Hypokalemia  Nausea vomiting emesis  Anorexia  Coagulopathy      PLAN:  Doing well out of ICU.  Continue broad-spectrum antibiotics per ID recommendations.  Weaning stress dose steroids.  MRCP done.  Respiratory status stable and will see as needed.        Pavan Merino MD  Pulmonary and Critical Care Medicine  [facility]  3/6/2024    07:31 EST

## 2024-03-06 NOTE — PROGRESS NOTES
Name: Chantelle Vásquez ADMIT: 3/1/2024   : 1985  PCP: Shin Orr MD    MRN: [id number] LOS: 5 days   AGE/SEX: 39 y.o. female  ROOM: [facility]     Subjective   Subjective     No events overnight. No complaints. Discussed with the patient and her  at bedside       Objective   Objective   Vital Signs  Temp:  [97.5 °F (36.4 °C)-98.4 °F (36.9 °C)] 98.4 °F (36.9 °C)  Heart Rate:  [69-82] 82  Resp:  [16] 16  BP: (100-133)/(72-98) 133/98  SpO2:  [96 %-100 %] 100 %  on   ;   Device (Oxygen Therapy): room air  Body mass index is 26.78 kg/m².  Physical Exam  Constitutional:       General: She is not in acute distress.     Appearance: She is not toxic-appearing.   Cardiovascular:      Rate and Rhythm: Normal rate and regular rhythm.   Pulmonary:      Effort: Pulmonary effort is normal.      Breath sounds: Normal breath sounds.   Abdominal:      General: Bowel sounds are normal.      Palpations: Abdomen is soft.   Musculoskeletal:      Right lower leg: Edema present.      Left lower leg: Edema present.   Skin:     Coloration: Skin is jaundiced.   Neurological:      Mental Status: She is alert.   Psychiatric:         Mood and Affect: Mood normal.         Behavior: Behavior normal.         Results Review     I reviewed the patient's new clinical results.  Results from last 7 days   Lab Units 24  0524   WBC 10*3/mm3 3.75 4.57 3.77 3.18*   HEMOGLOBIN g/dL 9.4* 9.3* 8.6* 8.3*   PLATELETS 10*3/mm3 56* 57* 57* 54*     Results from last 7 days   Lab Units 24  04324  0323 24  0105 24  1450   SODIUM mmol/L 137 138 139 138   POTASSIUM mmol/L 3.4* 4.0 3.5 3.6   CHLORIDE mmol/L 103 104 103 102   CO2 mmol/L 25.5 28.0 25.8 25.0   BUN mg/dL 21* 27* 31* 33*   CREATININE mg/dL 0.85 1.23* 1.63* 1.81*   GLUCOSE mg/dL 88 115* 111* 110*   Estimated Creatinine Clearance: 98.6 mL/min (by C-G formula based on SCr of 0.85 mg/dL).  Results from last 7 days    Lab Units 03/06/24  0439 03/05/24  0323 03/04/24  0105 03/03/24  0524   ALBUMIN g/dL 2.8* 3.2* 3.1* 3.3*   BILIRUBIN mg/dL 2.2* 2.9* 5.6* 5.5*   ALK PHOS U/L 275* 356* 227* 116   AST (SGOT) U/L 29 34* 43* 37*   ALT (SGPT) U/L 46* 53* 68* 69*     Results from last 7 days   Lab Units 03/06/24  0439 03/05/24  0323 03/04/24  0105 03/03/24  1450 03/03/24  0524   CALCIUM mg/dL 7.5* 8.0* 7.5* 7.8* 6.8*   ALBUMIN g/dL 2.8* 3.2* 3.1*  --  3.3*   MAGNESIUM mg/dL 2.1 2.1 2.7*  --  2.5   PHOSPHORUS mg/dL 2.3* 2.1* 2.8  --  3.0     Results from last 7 days   Lab Units 03/03/24  0802 03/02/24  1421 03/02/24  0602 03/02/24  0042 03/01/24  1916 03/01/24  1612   PROCALCITONIN ng/mL  --   --   --  15.20*  --  16.90*   LACTATE mmol/L 2.2* 3.1* 4.6* 3.5*   < > 6.1*    < > = values in this interval not displayed.     COVID19   Date Value Ref Range Status   03/01/2024 Not Detected Not Detected - Ref. Range Final     SARS-CoV-2 PCR   Date Value Ref Range Status   12/18/2020 Not Detected Not Detected Final     Comment:     Nucleic acid specific to SARS-CoV-2 (COVID-19) virus was not detected in  this sample by the Aptima (R) SARS-CoV-2 Assay.                SARS-CoV-2 (COVID-19) nucleic acid testing performed using [employer] Aptima (R) SARS-CoV-2 Assay or [employer] TaqPath (TM)  COVID-19 Combo Kit as indicated above under Emergency Use Authorization (EUA) from the FDA. Aptima (R) and TaqPath (TM) test performance  characteristics verified by [facility] in accordance with the FDAs Guidance Document (Policy for Diagnostic Tests for Coronavirus Disease-2019  during the Public Health Emergency) issued on March 16, 2020. The laboratory is regulated under CLIA as qualified to perform high-complexity testing  Unless otherwise noted, all testing was performed at [facility], CLIA No. [id number], [place] State Licensee No. [id number]     Glucose   Date/Time Value Ref Range Status   03/04/2024 2317 111 70 - 130 mg/dL Final       MRI  abdomen w wo contrast mrcp  MRI AN MRCP ABDOMEN WITH AND WITHOUT CONTRAST     HISTORY: Elevated LFTs, history of biliary atresia, improving     TECHNIQUE: Multiplanar multisequence MRI and MRCP of the abdomen was  performed before and after the IV administration of 15 mL MultiHance.     COMPARISON: CT abdomen pelvis 3/1/2024     FINDINGS:  Small bilateral pleural effusions, right greater than left, with  overlying pulmonary opacification is only partially seen.     The liver is cirrhotic and steatotic. The steatosis is primarily  involving the left hepatic and caudate lobes.. No definite biliary ducts  are visualized, in keeping with patient history of biliary atresia. The  duodenum courses directly through the debbie hepatis and contacts a large  area of the liver with what appears to be surgical clips in this area  suggestive of postsurgical changes related to Kasai procedure. Spleen is  severely enlarged, measuring approximately 20 cm in length.     The pancreas, adrenal glands and kidneys have an unremarkable  MR appearance.     The main portal and hepatic veins are patent. The main portal vein is  also distended. There are large perisplenic and perigastric varices as  well as significant distention and recanalization of the visualized  umbilical vein. The liver capsule demonstrates progressive enhancement.     IMPRESSION  1.  No discrete biliary ductal anatomy is visualized. A short segment of  the duodenum directly abuts the debbie hepatis with adjacent surgical  clips likely representing postsurgical changes from prior Kasai  procedure.  2.  The liver is cirrhotic with finding of portosystemic shunting  including small mount of ascites, severe splenomegaly and large varices  as detailed above. Progressive enhancement of the liver capsule is  demonstrated, nonspecific, possibly related to underlying hepatitis.  Continued attention on follow-up is recommended to ensure stability  and/or resolution is recommended  with MRI of the abdomen and 6 to 8  weeks  3.  At least small bilateral pleural effusions an overlying pulmonary  opacification are incompletely visualized.  4.  Other findings as above.        This report was finalized on 3/5/2024 2:55 PM by Dr. Christian Magdaleno M.D  on Workstation: [id number]       Scheduled Medications  hydrocortisone sodium succinate, 25 mg, Intravenous, Q12H  midodrine, 10 mg, Oral, BID AC  piperacillin-tazobactam, 4.5 g, Intravenous, Q8H  potassium chloride, 40 mEq, Oral, Once  senna-docusate sodium, 2 tablet, Oral, BID  sodium chloride, 10 mL, Intravenous, Q12H    Infusions   Diet  Diet: Gastrointestinal; Fat-Restricted; Fluid Consistency: Thin (IDDSI 0)       Assessment/Plan     Active Hospital Problems    Diagnosis  POA    **Sepsis [A41.9]  Yes    Biliary atresia [Q44.2]  Not Applicable    SERGE (acute kidney injury) [N17.9]  Yes    Hyperbilirubinemia [E80.6]  Yes    Cirrhosis of liver [K74.60]  Yes    Pancytopenia [D61.818]  Yes    Elevated LFTs [R79.89]  Yes    Portal venous hypertension [K76.6]  Yes      Resolved Hospital Problems   No resolved problems to display.       39 y.o. female admitted with Sepsis.    Septic shock-source of infection not clearly identified at the moment. With her history of biliary atresia, a biliary source was suspected. An MRCP showed chronic changes and evidence of hepatitis. Currently on zosyn per ID with plans to switch to augmentin at discharge for a total of 10 days of therapy. Pulmonology is weaning steroids.  SERGE-improved with midodrine/IVF/vasopressors/treatment of sepsis.   Hyperbilirubinemia/elevated LFTs-possibly related to the above. Improving  Pancytopenia-due to the above and cirrhosis. Hgb stable at 9.4. plt's stable at 56. Wbc is normal.   Biliary atresia s/p Kasai procedure remotely leading to cirrhosis with portal hypertension and varices-nadalol held acutely given septic shock  SCDs for DVT prophylaxis.  Full code.  Discussed with patient, spouse,  and nursing staff.  Anticipate discharge home in 1-2 days. Once steroids have been weaned and if ok with GI      Louis Mendez MD  [facility]  03/06/24  13:24 EST    I wore protective equipment throughout this patient encounter including a face mask, gloves and protective eyewear.  Hand hygiene was performed before donning protective equipment and after removal when leaving the room.

## 2024-03-06 NOTE — SIGNIFICANT NOTE
03/06/24 0653   OTHER   Discipline physical therapist   Therapy Assessment/Plan (PT)   Criteria for Skilled Interventions Met (PT) no problems identified which require skilled intervention  (Nsg doc pt is up sba as well as an ampac of 23.  No indication for acute PT needs)

## 2024-03-07 ENCOUNTER — TELEPHONE (OUTPATIENT)
Dept: GASTROENTEROLOGY | Facility: CLINIC | Age: 39
End: 2024-03-07

## 2024-03-07 LAB
ALBUMIN SERPL-MCNC: 3.1 G/DL (ref 3.5–5.2)
ALBUMIN/GLOB SERPL: 1.8 G/DL
ALP SERPL-CCNC: 305 U/L (ref 39–117)
ALT SERPL W P-5'-P-CCNC: 49 U/L (ref 1–33)
ANION GAP SERPL CALCULATED.3IONS-SCNC: 9 MMOL/L (ref 5–15)
AST SERPL-CCNC: 35 U/L (ref 1–32)
BILIRUB SERPL-MCNC: 2 MG/DL (ref 0–1.2)
BUN SERPL-MCNC: 14 MG/DL (ref 6–20)
BUN/CREAT SERPL: 15.1 (ref 7–25)
CALCIUM SPEC-SCNC: 8.1 MG/DL (ref 8.6–10.5)
CHLORIDE SERPL-SCNC: 102 MMOL/L (ref 98–107)
CO2 SERPL-SCNC: 28 MMOL/L (ref 22–29)
CREAT SERPL-MCNC: 0.93 MG/DL (ref 0.57–1)
EGFRCR SERPLBLD CKD-EPI 2021: 80.3 ML/MIN/1.73
GLOBULIN UR ELPH-MCNC: 1.7 GM/DL
GLUCOSE SERPL-MCNC: 75 MG/DL (ref 65–99)
POTASSIUM SERPL-SCNC: 3.5 MMOL/L (ref 3.5–5.2)
PROT SERPL-MCNC: 4.8 G/DL (ref 6–8.5)
SODIUM SERPL-SCNC: 139 MMOL/L (ref 136–145)

## 2024-03-07 PROCEDURE — 99232 SBSQ HOSP IP/OBS MODERATE 35: CPT | Performed by: INTERNAL MEDICINE

## 2024-03-07 PROCEDURE — 25010000002 PIPERACILLIN SOD-TAZOBACTAM PER 1 G: Performed by: STUDENT IN AN ORGANIZED HEALTH CARE EDUCATION/TRAINING PROGRAM

## 2024-03-07 PROCEDURE — 80053 COMPREHEN METABOLIC PANEL: CPT | Performed by: INTERNAL MEDICINE

## 2024-03-07 RX ORDER — AMOXICILLIN AND CLAVULANATE POTASSIUM 875; 125 MG/1; MG/1
1 TABLET, FILM COATED ORAL EVERY 12 HOURS SCHEDULED
Status: DISCONTINUED | OUTPATIENT
Start: 2024-03-07 | End: 2024-03-08 | Stop reason: HOSPADM

## 2024-03-07 RX ORDER — NADOLOL 20 MG/1
20 TABLET ORAL DAILY
Status: DISCONTINUED | OUTPATIENT
Start: 2024-03-07 | End: 2024-03-08 | Stop reason: HOSPADM

## 2024-03-07 RX ADMIN — Medication 10 ML: at 21:25

## 2024-03-07 RX ADMIN — PIPERACILLIN AND TAZOBACTAM 4.5 G: 4; .5 INJECTION, POWDER, FOR SOLUTION INTRAVENOUS at 08:32

## 2024-03-07 RX ADMIN — Medication 10 ML: at 11:40

## 2024-03-07 RX ADMIN — MIDODRINE HYDROCHLORIDE 10 MG: 5 TABLET ORAL at 17:18

## 2024-03-07 RX ADMIN — MIDODRINE HYDROCHLORIDE 10 MG: 5 TABLET ORAL at 08:33

## 2024-03-07 RX ADMIN — NADOLOL 20 MG: 20 TABLET ORAL at 11:39

## 2024-03-07 RX ADMIN — AMOXICILLIN AND CLAVULANATE POTASSIUM 1 TABLET: 875; 125 TABLET, FILM COATED ORAL at 21:25

## 2024-03-07 RX ADMIN — AMOXICILLIN AND CLAVULANATE POTASSIUM 1 TABLET: 875; 125 TABLET, FILM COATED ORAL at 11:39

## 2024-03-07 NOTE — PROGRESS NOTES
Name: Chantelle Vásquez ADMIT: 3/1/2024   : 1985  PCP: Shin Orr MD    MRN: [id number] LOS: 6 days   AGE/SEX: 39 y.o. female  ROOM: [facility]     Subjective   Subjective     No events overnight. No complaints.        Objective   Objective   Vital Signs  Temp:  [97.5 °F (36.4 °C)-98.1 °F (36.7 °C)] 98.1 °F (36.7 °C)  Heart Rate:  [71-77] 72  Resp:  [16] 16  BP: (111-141)/(71-97) 141/80  SpO2:  [96 %-100 %] 98 %  on   ;   Device (Oxygen Therapy): room air  Body mass index is 27.31 kg/m².  Physical Exam  Constitutional:       General: She is not in acute distress.     Appearance: She is not toxic-appearing.   Cardiovascular:      Rate and Rhythm: Normal rate and regular rhythm.   Pulmonary:      Effort: Pulmonary effort is normal.      Breath sounds: Normal breath sounds.   Abdominal:      General: Bowel sounds are normal.      Palpations: Abdomen is soft.   Musculoskeletal:      Right lower leg: Edema present.      Left lower leg: Edema present.   Skin:     Coloration: Skin is jaundiced.   Neurological:      Mental Status: She is alert.   Psychiatric:         Mood and Affect: Mood normal.         Behavior: Behavior normal.         Results Review     I reviewed the patient's new clinical results.  Results from last 7 days   Lab Units 24  0439 24  0323 24  0105 24  0524   WBC 10*3/mm3 3.75 4.57 3.77 3.18*   HEMOGLOBIN g/dL 9.4* 9.3* 8.6* 8.3*   PLATELETS 10*3/mm3 56* 57* 57* 54*     Results from last 7 days   Lab Units 24  0639 24  1625 24  0439 24  0323 24  0105   SODIUM mmol/L 139  --  137 138 139   POTASSIUM mmol/L 3.5 3.7 3.4* 4.0 3.5   CHLORIDE mmol/L 102  --  103 104 103   CO2 mmol/L 28.0  --  25.5 28.0 25.8   BUN mg/dL 14  --  21* 27* 31*   CREATININE mg/dL 0.93  --  0.85 1.23* 1.63*   GLUCOSE mg/dL 75  --  88 115* 111*   Estimated Creatinine Clearance: 90.9 mL/min (by C-G formula based on SCr of 0.93 mg/dL).  Results from last 7 days   Lab  Units 03/07/24  0639 03/06/24  0439 03/05/24  0323 03/04/24  0105   ALBUMIN g/dL 3.1* 2.8* 3.2* 3.1*   BILIRUBIN mg/dL 2.0* 2.2* 2.9* 5.6*   ALK PHOS U/L 305* 275* 356* 227*   AST (SGOT) U/L 35* 29 34* 43*   ALT (SGPT) U/L 49* 46* 53* 68*     Results from last 7 days   Lab Units 03/07/24  0639 03/06/24  0439 03/05/24  0323 03/04/24  0105 03/03/24  1450 03/03/24  0524   CALCIUM mg/dL 8.1* 7.5* 8.0* 7.5*   < > 6.8*   ALBUMIN g/dL 3.1* 2.8* 3.2* 3.1*  --  3.3*   MAGNESIUM mg/dL  --  2.1 2.1 2.7*  --  2.5   PHOSPHORUS mg/dL  --  2.3* 2.1* 2.8  --  3.0    < > = values in this interval not displayed.     Results from last 7 days   Lab Units 03/03/24  0802 03/02/24  1421 03/02/24  0602 03/02/24  0042 03/01/24  1916 03/01/24  1612   PROCALCITONIN ng/mL  --   --   --  15.20*  --  16.90*   LACTATE mmol/L 2.2* 3.1* 4.6* 3.5*   < > 6.1*    < > = values in this interval not displayed.     COVID19   Date Value Ref Range Status   03/01/2024 Not Detected Not Detected - Ref. Range Final     SARS-CoV-2 PCR   Date Value Ref Range Status   12/18/2020 Not Detected Not Detected Final     Comment:     Nucleic acid specific to SARS-CoV-2 (COVID-19) virus was not detected in  this sample by the Aptima (R) SARS-CoV-2 Assay.                SARS-CoV-2 (COVID-19) nucleic acid testing performed using [employer] Aptima (R) SARS-CoV-2 Assay or DecisionViewPath (TM)  COVID-19 Combo Kit as indicated above under Emergency Use Authorization (EUA) from the FDA. Aptima (R) and TaqPath (TM) test performance  characteristics verified by [facility] in accordance with the FDAs Guidance Document (Policy for Diagnostic Tests for Coronavirus Disease-2019  during the Public Health Emergency) issued on March 16, 2020. The laboratory is regulated under CLIA as qualified to perform high-complexity testing  Unless otherwise noted, all testing was performed at [facility], CLIA No. [id number], [place] State Licensee No. [id number]     Glucose   Date/Time  Value Ref Range Status   03/04/2024 2317 111 70 - 130 mg/dL Final       MRI abdomen w wo contrast mrcp  MRI AN MRCP ABDOMEN WITH AND WITHOUT CONTRAST     HISTORY: Elevated LFTs, history of biliary atresia, improving     TECHNIQUE: Multiplanar multisequence MRI and MRCP of the abdomen was  performed before and after the IV administration of 15 mL MultiHance.     COMPARISON: CT abdomen pelvis 3/1/2024     FINDINGS:  Small bilateral pleural effusions, right greater than left, with  overlying pulmonary opacification is only partially seen.     The liver is cirrhotic and steatotic. The steatosis is primarily  involving the left hepatic and caudate lobes.. No definite biliary ducts  are visualized, in keeping with patient history of biliary atresia. The  duodenum courses directly through the debbie hepatis and contacts a large  area of the liver with what appears to be surgical clips in this area  suggestive of postsurgical changes related to Kasai procedure. Spleen is  severely enlarged, measuring approximately 20 cm in length.     The pancreas, adrenal glands and kidneys have an unremarkable  MR appearance.     The main portal and hepatic veins are patent. The main portal vein is  also distended. There are large perisplenic and perigastric varices as  well as significant distention and recanalization of the visualized  umbilical vein. The liver capsule demonstrates progressive enhancement.     IMPRESSION  1.  No discrete biliary ductal anatomy is visualized. A short segment of  the duodenum directly abuts the debbie hepatis with adjacent surgical  clips likely representing postsurgical changes from prior Kasai  procedure.  2.  The liver is cirrhotic with finding of portosystemic shunting  including small mount of ascites, severe splenomegaly and large varices  as detailed above. Progressive enhancement of the liver capsule is  demonstrated, nonspecific, possibly related to underlying hepatitis.  Continued attention on  follow-up is recommended to ensure stability  and/or resolution is recommended with MRI of the abdomen and 6 to 8  weeks  3.  At least small bilateral pleural effusions an overlying pulmonary  opacification are incompletely visualized.  4.  Other findings as above.        This report was finalized on 3/5/2024 2:55 PM by Dr. 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       39 y.o. female admitted with Sepsis.    Septic shock-source of infection not clearly identified. With her history of biliary atresia and elevated LFTs/hyperbilirubinemia, a biliary source was suspected. An MRCP showed chronic changes and evidence of hepatitis. Initially treated with zosyn and transitioned to Augmentin today to complete 10 days of therapy. Will stop steroids today to complete her wean and restart her nadolol   SERGE-improved with midodrine/IVF/vasopressors/treatment of sepsis.   Hyperbilirubinemia/elevated LFTs-possibly related to the above. Improving  Pancytopenia-due to the above and cirrhosis.   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  Full  code.  Discussed with patient and spouse.  Anticipate discharge home tomorrow. If bp is ok of steroids and back on nadolol       Louis Mendez MD  [facility]  03/07/24  10:06 EST    I wore protective equipment throughout this patient encounter including a face mask, gloves and protective eyewear.  Hand hygiene was performed before donning protective equipment and after removal when leaving the room.

## 2024-03-07 NOTE — PROGRESS NOTES
ID note for septic shock  S:   Feels well.  Would like to go home.  No sig pain    Physical Exam:   Vital Signs   Temp:  [97.5 °F (36.4 °C)-98.1 °F (36.7 °C)] 98.1 °F (36.7 °C)  Heart Rate:  [71-77] 72  Resp:  [16] 16  BP: (111-141)/(71-97) 141/80    GENERAL: Awake and alert, ill-appearing  HEENT: Oropharynx is clear. Hearing is grossly normal.   EYES: . No conjunctival injection. No lid lag.   LUNGS:normal respiratory effort.   SKIN: no cutaneous eruptions in exposed areas.  Jaundice  PSYCHIATRIC: Appropriate mood, affect, insight, and judgment.     Results Review:    Creatinine 0.93, alk phos 305, ALT 49, total bilirubin 2.0      3/1 Bcx NGTD  3/1 strep A pcr neg  3/1 Ucx neg  3/1 MRSA PCR neg  3/1 RPP neg  3/1 Ucx neg         A/p  1.  Septic shock  2.  Biliary atresia  3.  Cirrhosis with portal hypertension  4.  Acute kidney injury  5.  Lactic acid acidosis    Suspected biliary sepsis  Cultures remain negative.  LFTs improved.  MRCP with portal hypertension and cirrhotic changes and ascites but no evidence of biliary obstruction  Dc zosyn  Start Augmentin 875/125 p.o. every 12 x 3 days.  No objection to discharge when okay with others

## 2024-03-07 NOTE — PLAN OF CARE
Goal Outcome Evaluation:      Pt resting in bed quietly. Denies pain. Room air. Up independently. Iv antibiotics.

## 2024-03-07 NOTE — PLAN OF CARE
Goal Outcome Evaluation:  Plan of Care Reviewed With: patient        Progress: improving  Outcome Evaluation: Pt sitting up in chair. AxOx4. up ad mukesh. very steady on her feet. Vitals WNL. No complaints of pain at this time.

## 2024-03-08 ENCOUNTER — READMISSION MANAGEMENT (OUTPATIENT)
Dept: CALL CENTER | Facility: HOSPITAL | Age: 39
End: 2024-03-08

## 2024-03-08 ENCOUNTER — TELEPHONE (OUTPATIENT)
Dept: GASTROENTEROLOGY | Facility: CLINIC | Age: 39
End: 2024-03-08

## 2024-03-08 VITALS
RESPIRATION RATE: 18 BRPM | TEMPERATURE: 98.2 F | SYSTOLIC BLOOD PRESSURE: 132 MMHG | DIASTOLIC BLOOD PRESSURE: 86 MMHG | HEART RATE: 68 BPM | OXYGEN SATURATION: 92 % | WEIGHT: 174.6 LBS | HEIGHT: 68 IN | BODY MASS INDEX: 26.46 KG/M2

## 2024-03-08 PROBLEM — N17.9 AKI (ACUTE KIDNEY INJURY): Status: RESOLVED | Noted: 2024-03-05 | Resolved: 2024-03-08

## 2024-03-08 PROBLEM — A41.9 SEPSIS: Status: RESOLVED | Noted: 2024-03-01 | Resolved: 2024-03-08

## 2024-03-08 LAB
ALBUMIN SERPL-MCNC: 3 G/DL (ref 3.5–5.2)
ALBUMIN/GLOB SERPL: 1.6 G/DL
ALP SERPL-CCNC: 290 U/L (ref 39–117)
ALT SERPL W P-5'-P-CCNC: 51 U/L (ref 1–33)
ANION GAP SERPL CALCULATED.3IONS-SCNC: 8.3 MMOL/L (ref 5–15)
AST SERPL-CCNC: 38 U/L (ref 1–32)
BILIRUB SERPL-MCNC: 1.6 MG/DL (ref 0–1.2)
BUN SERPL-MCNC: 11 MG/DL (ref 6–20)
BUN/CREAT SERPL: 17.5 (ref 7–25)
CALCIUM SPEC-SCNC: 8 MG/DL (ref 8.6–10.5)
CHLORIDE SERPL-SCNC: 103 MMOL/L (ref 98–107)
CO2 SERPL-SCNC: 28.7 MMOL/L (ref 22–29)
CREAT SERPL-MCNC: 0.63 MG/DL (ref 0.57–1)
EGFRCR SERPLBLD CKD-EPI 2021: 115.9 ML/MIN/1.73
GLOBULIN UR ELPH-MCNC: 1.9 GM/DL
GLUCOSE SERPL-MCNC: 76 MG/DL (ref 65–99)
POTASSIUM SERPL-SCNC: 3.6 MMOL/L (ref 3.5–5.2)
PROT SERPL-MCNC: 4.9 G/DL (ref 6–8.5)
SODIUM SERPL-SCNC: 140 MMOL/L (ref 136–145)

## 2024-03-08 PROCEDURE — 99232 SBSQ HOSP IP/OBS MODERATE 35: CPT | Performed by: INTERNAL MEDICINE

## 2024-03-08 PROCEDURE — 80053 COMPREHEN METABOLIC PANEL: CPT | Performed by: INTERNAL MEDICINE

## 2024-03-08 RX ORDER — AMOXICILLIN AND CLAVULANATE POTASSIUM 875; 125 MG/1; MG/1
1 TABLET, FILM COATED ORAL EVERY 12 HOURS SCHEDULED
Qty: 3 TABLET | Refills: 0 | Status: SHIPPED | OUTPATIENT
Start: 2024-03-08 | End: 2024-03-10

## 2024-03-08 RX ADMIN — NADOLOL 20 MG: 20 TABLET ORAL at 08:58

## 2024-03-08 RX ADMIN — Medication 10 ML: at 09:01

## 2024-03-08 RX ADMIN — MIDODRINE HYDROCHLORIDE 10 MG: 5 TABLET ORAL at 08:58

## 2024-03-08 RX ADMIN — AMOXICILLIN AND CLAVULANATE POTASSIUM 1 TABLET: 875; 125 TABLET, FILM COATED ORAL at 08:59

## 2024-03-08 NOTE — CASE MANAGEMENT/SOCIAL WORK
Case Management Discharge Note      Final Note: Pt discharged home with spouse.   BRANDON Sanchez RN         Selected Continued Care - Discharged on 3/8/2024 Admission date: 3/1/2024 - Discharge disposition: Home or Self Care      Destination    No services have been selected for the patient.                Durable Medical Equipment    No services have been selected for the patient.                Dialysis/Infusion    No services have been selected for the patient.                Home Medical Care    No services have been selected for the patient.                Therapy    No services have been selected for the patient.                Community Resources    No services have been selected for the patient.                Community & DME    No services have been selected for the patient.                    Transportation Services  Private: Car    Final Discharge Disposition Code: 01 - home or self-care

## 2024-03-08 NOTE — PLAN OF CARE
Goal Outcome Evaluation:  Plan of Care Reviewed With: patient        Progress: improving  Outcome Evaluation: Patient  resting  this  shift.  Started  oral  antibiotics  and tolerated  well.    Denies  pain.  SR on the  monitor.  Home today.  Nursing  will  continue to monitor.

## 2024-03-08 NOTE — PROGRESS NOTES
ID note for septic shock  S:   No pain.  She still feels little swollen.  Afebrile.  Tolerating oral antibiotic  [facility]  note reviewed and planning discharge today  Physical Exam:   Vital Signs   Temp:  [97.9 °F (36.6 °C)-98.5 °F (36.9 °C)] 98.2 °F (36.8 °C)  Heart Rate:  [66-72] 68  Resp:  [16-18] 18  BP: (115-132)/(78-86) 132/86    GENERAL: Awake and alert, ill-appearing  HEENT: Oropharynx is clear. Hearing is grossly normal.   EYES: . No conjunctival injection. No lid lag.   LUNGS:normal respiratory effort.   SKIN: no cutaneous eruptions in exposed areas.  Jaundice  PSYCHIATRIC: Appropriate mood, affect, insight, and judgment.     Results Review:    Creatinine 0.6  Alk phos 290, ALT 51, AST 38, total bilirubin 1.6      3/1 Bcx negative  3/1 strep A pcr neg  3/1 Ucx neg  3/1 MRSA PCR neg  3/1 RPP neg  3/1 Ucx neg         A/p  1.  Septic shock  2.  Biliary atresia  3.  Cirrhosis with portal hypertension  4.  Acute kidney injury  5.  Lactic acid acidosis    Suspected biliary sepsis  Cultures all negative.  LFTs improved.  MRCP with portal hypertension and cirrhotic changes and ascites but no evidence of biliary obstruction  Continue Augmentin 875/125 p.o. every 12 x 2 days.  No objection to discharge when okay with others  We will sign off

## 2024-03-08 NOTE — DISCHARGE SUMMARY
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]  Not Applicable    Hyperbilirubinemia [E80.6]  Yes    Pancytopenia [D61.818]  Yes    Elevated LFTs [R79.89]  Yes    Portal venous hypertension [K76.6]  Yes      Resolved Hospital Problems    Diagnosis Date Resolved POA    SERGE (acute kidney injury) [N17.9] 03/08/2024 Yes    Sepsis [A41.9] 03/08/2024 Yes          Consults       Date and Time Order Name Status Description    3/5/2024  9:51 AM Inpatient Hospitalist Consult      3/2/2024  5:48 AM Inpatient Nephrology Consult Completed     3/2/2024  4:05 AM Inpatient Infectious Diseases Consult Completed     3/1/2024 10:39 PM Inpatient Gastroenterology Consult            Hospital Course  39 y.o. female who ultimately has a history of biliary atresia who presented with septic shock from a presumed biliary source.  Infection etiology was never clearly identified, but she improved with Zosyn transition to Augmentin per infectious disease and was transferred out of the ICU to the floor where she continued to clinically improve.  MRCP demonstrated chronic changes with evidence of hepatitis.  She's been doing really well and is tolerating p.o. with good appetite.  She has no fevers chills nausea vomiting abdominal pain and is producing urine without issue.  She did have some acute renal failure and renal saw in consultation and they have subsequently signed off.  She was continued on midodrine but given further reassuring trends of blood pressure by discharge even with the reinstatement of nadolol, midodrine is no longer needed and septic shock was etiology for hypotension.  Infectious diseases transferred her antibiotics over to Augmentin for just a couple days more to complete her antibiotic regimen.  GI even followed  in consultation and they plan on an EGD which was already scheduled for next month and since GI has not prescribed a PPI, will hold until they can pursue EGD first.   Patient sitting up in bedside chair on day of discharge conversational and pleasant.  She is not voicing any new complaints and she is very much amenable to the idea of discharge home today.  She is appreciative the care she received while here and amenable to the above plan and denies any additional discharge needs.          Temp:  [97.9 °F (36.6 °C)-98.5 °F (36.9 °C)] 98.2 °F (36.8 °C)  Heart Rate:  [66-72] 68  Resp:  [16-18] 18  BP: (115-132)/(78-86) 132/86  Body mass index is 26.55 kg/m².    Physical Exam  Constitutional:       Comments: Nontoxic conversational and pleasant   HENT:      Head: Normocephalic.      Nose: Nose normal.      Mouth/Throat:      Mouth: Mucous membranes are moist.      Pharynx: Oropharynx is clear.   Eyes:      Conjunctiva/sclera: Conjunctivae normal.   Cardiovascular:      Rate and Rhythm: Normal rate and regular rhythm.   Pulmonary:      Effort: Pulmonary effort is normal. No respiratory distress.      Breath sounds: Normal breath sounds.   Abdominal:      General: Bowel sounds are normal. There is no distension.      Palpations: Abdomen is soft.      Tenderness: There is no abdominal tenderness. There is no guarding or rebound.   Musculoskeletal:      Comments: Trace edema   Skin:     General: Skin is warm and dry.      Coloration: Skin is not jaundiced.   Neurological:      General: No focal deficit present.      Mental Status: She is alert and oriented to person, place, and time. Mental status is at baseline.   Psychiatric:         Mood and Affect: Mood normal.         Behavior: Behavior normal.         Thought Content: Thought content normal.         Judgment: Judgment normal.       Disposition: Home or Self Care       Discharge Medications        New Medications        Instructions Start Date    amoxicillin-clavulanate 875-125 MG per tablet  Commonly known as: AUGMENTIN   1 tablet, Oral, Every 12 Hours Scheduled             Continue These Medications        Instructions Start Date   multivitamin with minerals tablet tablet   1 tablet, Oral, Daily      nadolol 20 MG tablet  Commonly known as: CORGARD   TAKE 1 TABLET BY MOUTH EVERY DAY                Additional Instructions for the Follow-ups that You Need to Schedule       Discharge Follow-up with PCP   As directed       Currently Documented PCP:    Shin Orr MD    PCP Phone Number:    [phone]     Follow Up Details: PCP 2 to 3 weeks as needed.  Consultants per their recommendations               Follow-up Information       Shin Orr MD .    Specialty: Internal Medicine  Why: PCP 2 to 3 weeks as needed.  Consultants per their recommendations  Contact information:  [address] [facility] [address] [phone]               Provider, No Known .    Why: PCP 2 to 3 weeks as needed.  Consultants per their recommendations  Contact information:  [facility] [address]                          No future appointments.     Lornezo Hyman MD  [facility]  03/08/24    Discharge time spent greater than 30 minutes.

## 2024-03-08 NOTE — TELEPHONE ENCOUNTER
MANINDER ROBERTO PT R/S TO 03/15/2024 ARRIVING AT 0830AM,PT VERBALIZES SHE STILL HAS EGD PREP INSTRUCTIONS AND WILL UPDATE TIME ,DATE CHANGES.O FOR HUB TO READ

## 2024-03-09 NOTE — OUTREACH NOTE
Prep Survey      Flowsheet Row Responses   Denominational facility patient discharged from? [facility]   Is LACE score < 7 ? No   Eligibility Readm Mgmt   Discharge diagnosis Cirrhosis of liver   Does the patient have one of the following disease processes/diagnoses(primary or secondary)? Other   Does the patient have Home health ordered? No   Is there a DME ordered? No   Prep survey completed? Yes            [facility] - Registered Nurse

## 2024-03-12 ENCOUNTER — READMISSION MANAGEMENT (OUTPATIENT)
Dept: CALL CENTER | Facility: HOSPITAL | Age: 39
End: 2024-03-12

## 2024-03-12 NOTE — OUTREACH NOTE
Medical Week 1 Survey      Flowsheet Row Responses   [facility] patient discharged from? [place]   Does the patient have one of the following disease processes/diagnoses(primary or secondary)? Other   Week 1 attempt successful? No   Unsuccessful attempts Attempt 1            Jacqui Kinney Registered Nurse

## 2024-03-13 ENCOUNTER — TELEPHONE (OUTPATIENT)
Dept: GASTROENTEROLOGY | Facility: CLINIC | Age: 39
End: 2024-03-13

## 2024-03-13 NOTE — TELEPHONE ENCOUNTER
PT WILL NOT NEED APPROVAL SINCE SHE IS A SELF PAY CONFIRMED WITH PT THAT SHE HAS NO INSURANCE  AND IS AWARE PAYMENT WILL BE REQUIRED DOS GAVE FCC PHONE NUMBER SO PT CAN CALL TO FIND OUT HOW MUCH

## 2024-03-13 NOTE — TELEPHONE ENCOUNTER
Mireya from [facility] called [phone] march 15 patient is scheduled but is self pay and not approved can she be pushed back     Please advise

## 2024-03-15 ENCOUNTER — ANESTHESIA EVENT (OUTPATIENT)
Dept: GASTROENTEROLOGY | Facility: HOSPITAL | Age: 39
End: 2024-03-15

## 2024-03-15 ENCOUNTER — ANESTHESIA (OUTPATIENT)
Dept: GASTROENTEROLOGY | Facility: HOSPITAL | Age: 39
End: 2024-03-15

## 2024-03-15 ENCOUNTER — HOSPITAL ENCOUNTER (OUTPATIENT)
Facility: HOSPITAL | Age: 39
Setting detail: HOSPITAL OUTPATIENT SURGERY
Discharge: HOME | End: 2024-03-15
Attending: INTERNAL MEDICINE | Admitting: INTERNAL MEDICINE

## 2024-03-15 VITALS
OXYGEN SATURATION: 97 % | HEIGHT: 68 IN | WEIGHT: 150 LBS | BODY MASS INDEX: 22.73 KG/M2 | HEART RATE: 77 BPM | SYSTOLIC BLOOD PRESSURE: 119 MMHG | RESPIRATION RATE: 18 BRPM | DIASTOLIC BLOOD PRESSURE: 88 MMHG

## 2024-03-15 LAB
ALBUMIN SERPL-MCNC: 4.2 G/DL (ref 3.5–5.2)
ALBUMIN/GLOB SERPL: 1.8 G/DL
ALP SERPL-CCNC: 411 U/L (ref 39–117)
ALT SERPL W P-5'-P-CCNC: 50 U/L (ref 1–33)
ANION GAP SERPL CALCULATED.3IONS-SCNC: 9 MMOL/L (ref 5–15)
AST SERPL-CCNC: 39 U/L (ref 1–32)
B-HCG UR QL: NEGATIVE
BILIRUB SERPL-MCNC: 1.7 MG/DL (ref 0–1.2)
BUN SERPL-MCNC: 9 MG/DL (ref 6–20)
BUN/CREAT SERPL: 14.3 (ref 7–25)
CALCIUM SPEC-SCNC: 9.3 MG/DL (ref 8.6–10.5)
CHLORIDE SERPL-SCNC: 103 MMOL/L (ref 98–107)
CO2 SERPL-SCNC: 26 MMOL/L (ref 22–29)
CREAT SERPL-MCNC: 0.63 MG/DL (ref 0.57–1)
DEPRECATED RDW RBC AUTO: 42.4 FL (ref 37–54)
EGFRCR SERPLBLD CKD-EPI 2021: 115.9 ML/MIN/1.73
ERYTHROCYTE [DISTWIDTH] IN BLOOD BY AUTOMATED COUNT: 13.4 % (ref 12.3–15.4)
EXPIRATION DATE: NORMAL
GLOBULIN UR ELPH-MCNC: 2.4 GM/DL
GLUCOSE SERPL-MCNC: 82 MG/DL (ref 65–99)
HCT VFR BLD AUTO: 33 % (ref 34–46.6)
HGB BLD-MCNC: 10.5 G/DL (ref 12–15.9)
INR PPP: 1.05 (ref 0.9–1.1)
INTERNAL NEGATIVE CONTROL: NEGATIVE
INTERNAL POSITIVE CONTROL: POSITIVE
Lab: NORMAL
MCH RBC QN AUTO: 28.2 PG (ref 26.6–33)
MCHC RBC AUTO-ENTMCNC: 31.8 G/DL (ref 31.5–35.7)
MCV RBC AUTO: 88.7 FL (ref 79–97)
PLATELET # BLD AUTO: 140 10*3/MM3 (ref 140–450)
PMV BLD AUTO: 10.1 FL (ref 6–12)
POTASSIUM SERPL-SCNC: 4.3 MMOL/L (ref 3.5–5.2)
PROT SERPL-MCNC: 6.6 G/DL (ref 6–8.5)
PROTHROMBIN TIME: 13.9 SECONDS (ref 11.7–14.2)
RBC # BLD AUTO: 3.72 10*6/MM3 (ref 3.77–5.28)
SODIUM SERPL-SCNC: 138 MMOL/L (ref 136–145)
WBC NRBC COR # BLD AUTO: 4.05 10*3/MM3 (ref 3.4–10.8)

## 2024-03-15 PROCEDURE — 43235 EGD DIAGNOSTIC BRUSH WASH: CPT | Performed by: INTERNAL MEDICINE

## 2024-03-15 PROCEDURE — 80053 COMPREHEN METABOLIC PANEL: CPT | Performed by: INTERNAL MEDICINE

## 2024-03-15 PROCEDURE — 25810000003 LACTATED RINGERS PER 1000 ML: Performed by: INTERNAL MEDICINE

## 2024-03-15 PROCEDURE — 85610 PROTHROMBIN TIME: CPT | Performed by: INTERNAL MEDICINE

## 2024-03-15 PROCEDURE — 85027 COMPLETE CBC AUTOMATED: CPT | Performed by: INTERNAL MEDICINE

## 2024-03-15 PROCEDURE — 25010000002 PROPOFOL 10 MG/ML EMULSION: Performed by: REGISTERED NURSE

## 2024-03-15 PROCEDURE — 81025 URINE PREGNANCY TEST: CPT | Performed by: INTERNAL MEDICINE

## 2024-03-15 RX ORDER — OMEPRAZOLE 20 MG/1
20 CAPSULE, DELAYED RELEASE ORAL DAILY
Qty: 30 CAPSULE | Refills: 11 | Status: SHIPPED | OUTPATIENT
Start: 2024-03-15

## 2024-03-15 RX ORDER — LIDOCAINE HYDROCHLORIDE 20 MG/ML
INJECTION, SOLUTION INFILTRATION; PERINEURAL AS NEEDED
Status: DISCONTINUED | OUTPATIENT
Start: 2024-03-15 | End: 2024-03-15 | Stop reason: SURG

## 2024-03-15 RX ORDER — SODIUM CHLORIDE, SODIUM LACTATE, POTASSIUM CHLORIDE, CALCIUM CHLORIDE 600; 310; 30; 20 MG/100ML; MG/100ML; MG/100ML; MG/100ML
1000 INJECTION, SOLUTION INTRAVENOUS CONTINUOUS
Status: DISCONTINUED | OUTPATIENT
Start: 2024-03-15 | End: 2024-03-15 | Stop reason: HOSPADM

## 2024-03-15 RX ORDER — PROPOFOL 10 MG/ML
VIAL (ML) INTRAVENOUS AS NEEDED
Status: DISCONTINUED | OUTPATIENT
Start: 2024-03-15 | End: 2024-03-15 | Stop reason: SURG

## 2024-03-15 RX ORDER — ONDANSETRON 2 MG/ML
4 INJECTION INTRAMUSCULAR; INTRAVENOUS ONCE AS NEEDED
Status: DISCONTINUED | OUTPATIENT
Start: 2024-03-15 | End: 2024-03-15 | Stop reason: HOSPADM

## 2024-03-15 RX ADMIN — LIDOCAINE HYDROCHLORIDE 100 MG: 20 INJECTION, SOLUTION INFILTRATION; PERINEURAL at 10:55

## 2024-03-15 RX ADMIN — SODIUM CHLORIDE, POTASSIUM CHLORIDE, SODIUM LACTATE AND CALCIUM CHLORIDE 1000 ML: 600; 310; 30; 20 INJECTION, SOLUTION INTRAVENOUS at 08:57

## 2024-03-15 RX ADMIN — PROPOFOL 180 MCG/KG/MIN: 10 INJECTION, EMULSION INTRAVENOUS at 10:56

## 2024-03-15 RX ADMIN — PROPOFOL 100 MG: 10 INJECTION, EMULSION INTRAVENOUS at 10:55

## 2024-03-15 NOTE — ANESTHESIA PREPROCEDURE EVALUATION
" Anesthesia Evaluation     Patient summary reviewed and Nursing notes reviewed   no history of anesthetic complications:   NPO Solid Status: > 8 hours  NPO Liquid Status: > 2 hours           Airway   Mallampati: I  TM distance: >3 FB  Dental - normal exam     Pulmonary    (-) COPD, asthma  Cardiovascular   Exercise tolerance: good (4-7 METS)    Rhythm: regular    (+) hypertension  (-) past MI, angina, cardiac stents      Neuro/Psych  (-) seizures, CVA  GI/Hepatic/Renal/Endo    (+) liver disease (congenital biliary atresia, portal hypertension, varices, low PLT (denies acites))  (-) GERD, no renal disease    Musculoskeletal     Abdominal    Substance History - negative use     OB/GYN          Other - negative ROS  blood dyscrasia (PLT 50s) thrombocytopenia,                       Anesthesia Plan    ASA 3     MAC     (I have reviewed the patient's history and chart with the patient, including all pertinent laboratory results and imaging. I have explained the risks of monitored anesthesia care including but not limited to respiratory depression, possible need for airway intervention, or possible intra-op recall. I explained that airway intervention involves risk of possible dental injury.    VITALS:  Ht 172.7 cm (68\")   Wt 68 kg (150 lb)   LMP 02/28/2024 (Exact Date)   BMI 22.81 kg/m² )  intravenous induction     Anesthetic plan, risks, benefits, and alternatives have been provided, discussed and informed consent has been obtained with: patient.  Pre-procedure education provided      CODE STATUS:         "

## 2024-03-15 NOTE — DISCHARGE INSTRUCTIONS
For the next 24 hours patient needs to be with a responsible adult.    For 24 hours DO NOT drive, operate machinery, appliances, drink alcohol, make important decisions or sign legal documents.    Start with a light or bland diet if you are feeling sick to your stomach otherwise advance to regular diet as tolerated.    Follow recommendations on procedure report if provided by your doctor.    Call Dr Castellanos for problems .    Problems may include but not limited to: large amounts of bleeding, trouble breathing, repeated vomiting, severe unrelieved pain, fever or chills.

## 2024-03-15 NOTE — BRIEF OP NOTE
ESOPHAGOGASTRODUODENOSCOPY  Progress Note    Chantelle Vásquez  3/15/2024    Pre-op Diagnosis:   Portal venous hypertension (HCC) [K76.6]       Post-Op Diagnosis Codes:     * Portal venous hypertension [K76.6]     * Gastritis [K29.70]    Procedure/CPT® Codes:        Procedure(s):  ESOPHAGOGASTRODUODENOSCOPY              Surgeon(s):  Cesar Castellanos MD    Anesthesia: Monitored Anesthesia Care    Staff:   Endo Technician: Adina Weaver  Endo Nurse: Martina Philippe RN         Estimated Blood Loss: none    Urine Voided: * No values recorded between 3/15/2024 10:47 AM and 3/15/2024 10:59 AM *    Specimens:                None          Drains:   [REMOVED] Urethral Catheter Non-latex 16 Fr. (Removed)   Daily Indications Hourly Output in Critical Unstable Patient requiring Frequent Intervention (hemodynamics, titration or life supportive therapy) 03/03/24 1200   Site Assessment Clean;Skin intact 03/03/24 1200   Collection Container Standard drainage bag 03/03/24 1200   Securement Method Securing device 03/03/24 1200   Catheter care complete Yes 03/03/24 0800   Output (mL) 120 mL 03/03/24 1240       Findings: EGD performed revealing normal duodenum.  Retroflexed view the GE junction was normal with patchy gastritis noted in the gastric body.  No esophageal or gastric varices were identified with normal esophageal mucosa throughout.        Complications: None          Cesar Castellanos MD     Date: 3/15/2024  Time: 11:01 EDT

## 2024-03-15 NOTE — ANESTHESIA POSTPROCEDURE EVALUATION
Patient: Chantelle Vásquez    Procedure Summary       Date: 03/15/24 Room / Location:  [facility]    Anesthesia Start: 1047 Anesthesia Stop: 1104    Procedure: ESOPHAGOGASTRODUODENOSCOPY (Esophagus) Diagnosis:       Portal venous hypertension      Gastritis      (Portal venous hypertension (HCC) [K76.6])    Surgeons: Cesar Castellanos MD Provider: Dorothy Burns MD    Anesthesia Type: MAC ASA Status: 3            Anesthesia Type: MAC    Vitals  Vitals Value Taken Time   /88 03/15/24 1124   Temp     Pulse 73 03/15/24 1129   Resp 18 03/15/24 1123   SpO2 95 % 03/15/24 1129   Vitals shown include unfiled device data.        Post Anesthesia Care and Evaluation    Patient location during evaluation: PHASE II  Level of consciousness: awake  Pain management: adequate    Airway patency: patent  Anesthetic complications: No anesthetic complications  PONV Status: none  Cardiovascular status: acceptable  Respiratory status: acceptable  Hydration status: acceptable

## 2024-03-19 ENCOUNTER — READMISSION MANAGEMENT (OUTPATIENT)
Dept: CALL CENTER | Facility: HOSPITAL | Age: 39
End: 2024-03-19

## 2024-03-19 NOTE — OUTREACH NOTE
Medical Week 2 Survey      Flowsheet Row Responses   [facility] patient discharged from? [place]   Does the patient have one of the following disease processes/diagnoses(primary or secondary)? Other   Week 2 attempt successful? No   Unsuccessful attempts Attempt 1            Marichuy KNIGHT - Licensed Nurse

## 2024-03-26 ENCOUNTER — READMISSION MANAGEMENT (OUTPATIENT)
Dept: CALL CENTER | Facility: HOSPITAL | Age: 39
End: 2024-03-26

## 2024-03-26 NOTE — OUTREACH NOTE
Medical Week 3 Survey      Flowsheet Row Responses   [facility] [place] patient discharged from? [place]   Does the patient have one of the following disease processes/diagnoses(primary or secondary)? Other   Week 3 attempt successful? No   Unsuccessful attempts Attempt 1            Marichuy KNIGHT - Licensed Nurse

## 2024-04-01 ENCOUNTER — TELEPHONE (OUTPATIENT)
Dept: GASTROENTEROLOGY | Facility: CLINIC | Age: 39
End: 2024-04-01

## 2024-04-01 NOTE — TELEPHONE ENCOUNTER
----- Message from Cesar Castellanos MD sent at 4/1/2024  1:19 PM EDT -----  Labs remain stable, arrange follow-up office visit to discuss further evaluation of the liver.

## 2024-04-02 ENCOUNTER — TELEPHONE (OUTPATIENT)
Dept: GASTROENTEROLOGY | Facility: CLINIC | Age: 39
End: 2024-04-02

## 2024-04-02 NOTE — TELEPHONE ENCOUNTER
Called pt and advised of Dr. Bernardo note.  Pt verbalized understanding.     Made f/u appt w/Dr Castellanos, 4/9/24 @3pm.

## 2024-04-02 NOTE — TELEPHONE ENCOUNTER
Hub staff attempted to follow warm transfer process and was unsuccessful     Caller: Chantelle Vásquez    Relationship to patient: Self    Best call back number: [phone]    Patient is needing: PATIENT CALLED IN, SHE IS RETURNING SHARONS CALL REGARDING TEST RESULTS. PLEASE CONTACT PATIENT TO DISCUSS, YOU CAN LEAVE A VMAIL.

## 2024-04-04 ENCOUNTER — TELEPHONE (OUTPATIENT)
Dept: GASTROENTEROLOGY | Facility: CLINIC | Age: 39
End: 2024-04-04

## 2024-04-04 NOTE — TELEPHONE ENCOUNTER
CT from 3/1/24. We had ordered a CT and it came back in our overdues. When I looked into the chart ct ws done on 3/1/24

## 2024-04-08 RX ORDER — NADOLOL 20 MG/1
TABLET ORAL
Qty: 90 TABLET | Refills: 1 | Status: SHIPPED | OUTPATIENT
Start: 2024-04-08

## 2024-04-09 ENCOUNTER — OFFICE VISIT (OUTPATIENT)
Dept: GASTROENTEROLOGY | Facility: CLINIC | Age: 39
End: 2024-04-09

## 2024-04-09 VITALS
WEIGHT: 150.8 LBS | TEMPERATURE: 97.8 F | BODY MASS INDEX: 22.85 KG/M2 | HEIGHT: 68 IN | DIASTOLIC BLOOD PRESSURE: 78 MMHG | SYSTOLIC BLOOD PRESSURE: 112 MMHG | HEART RATE: 80 BPM

## 2024-04-09 DIAGNOSIS — K76.6 PORTAL VENOUS HYPERTENSION: ICD-10-CM

## 2024-04-09 DIAGNOSIS — Q44.2 BILIARY ATRESIA: Primary | ICD-10-CM

## 2024-04-09 PROCEDURE — 99212 OFFICE O/P EST SF 10 MIN: CPT | Performed by: INTERNAL MEDICINE

## 2024-04-09 RX ORDER — OMEPRAZOLE 20 MG/1
20 CAPSULE, DELAYED RELEASE ORAL DAILY
Qty: 90 CAPSULE | Refills: 3 | Status: SHIPPED | OUTPATIENT
Start: 2024-04-09

## 2024-04-09 RX ORDER — NITROFURANTOIN 25; 75 MG/1; MG/1
CAPSULE ORAL
COMMUNITY
Start: 2024-04-05 | End: 2024-04-22

## 2024-04-22 ENCOUNTER — OFFICE VISIT (OUTPATIENT)
Dept: FAMILY MEDICINE CLINIC | Facility: CLINIC | Age: 39
End: 2024-04-22
Payer: MEDICAID

## 2024-04-22 VITALS
TEMPERATURE: 97.1 F | HEART RATE: 102 BPM | SYSTOLIC BLOOD PRESSURE: 94 MMHG | HEIGHT: 68 IN | DIASTOLIC BLOOD PRESSURE: 64 MMHG | OXYGEN SATURATION: 98 % | BODY MASS INDEX: 23.19 KG/M2 | WEIGHT: 153 LBS

## 2024-04-22 DIAGNOSIS — N39.0 URINARY TRACT INFECTION WITH HEMATURIA, SITE UNSPECIFIED: ICD-10-CM

## 2024-04-22 DIAGNOSIS — R31.9 URINARY TRACT INFECTION WITH HEMATURIA, SITE UNSPECIFIED: ICD-10-CM

## 2024-04-22 PROCEDURE — 99214 OFFICE O/P EST MOD 30 MIN: CPT | Performed by: STUDENT IN AN ORGANIZED HEALTH CARE EDUCATION/TRAINING PROGRAM

## 2024-04-22 NOTE — PROGRESS NOTES
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    Recent ICU hospitalization for sepsis related to UTI. She is doing better. No fevers, pain, urinary symptoms. She was hospitalized from 3/1 - 3/8.  She was treated with Zosyn and transition to Augmentin.  At first they were concerned that she potentially had acute cholangitis causing septic shock.  MRCP showed chronic changes with possible hepatitis.  She is not having any abdominal pain.  She improved and was discharged on Augmentin to complete her antibiotic regimen.  She saw her previous PCP who repeated urinalysis and subsequently placed her on a course of Macrobid which she has finished as well.    Diagnosed at birth with congenital biliary atresia. No complications. Started seeing GI during her pregnancies. Follows Dr. Castellanos yearly. Recent EGD with PHG, EV. Minimal ascites. Splenomegaly. On nadalol. Platelets normal. INR normal. Unknown hepatitis immunity status.     Otherwise, no medical problems. Mother does have history of cardiac disease. No recent lipid panel.       Objective   Vital Signs:  BP 94/64 (BP Location: Right arm, Patient Position: Sitting, Cuff Size: Adult)   Pulse 102   Temp 97.1 °F (36.2 °C) (Infrared)   Ht 172.7 cm (68\")   Wt 69.4 kg (153 lb)   SpO2 98%   BMI 23.26 kg/m²   Estimated body mass index is 23.26 kg/m² as calculated from the following:    Height as of this encounter: 172.7 cm (68\").    Weight as of this encounter: 69.4 kg (153 lb).       BMI is within normal parameters. No other follow-up for BMI required.      Physical Exam  Constitutional:       General: She is not in acute distress.  Eyes:      Conjunctiva/sclera: Conjunctivae normal.   Cardiovascular:    " Continue current management.   Rate and Rhythm: Normal rate and regular rhythm.   Pulmonary:      Effort: Pulmonary effort is normal. No respiratory distress.      Breath sounds: Normal breath sounds.   Abdominal:      Palpations: Abdomen is soft. There is no mass.      Tenderness: There is no abdominal tenderness. There is no right CVA tenderness, left CVA tenderness, guarding or rebound.   Skin:     General: Skin is warm and dry.   Neurological:      Mental Status: She is alert and oriented to person, place, and time.   Psychiatric:         Mood and Affect: Mood normal.         Behavior: Behavior normal.        Result Review :    The following data was reviewed by: Gilda Waldron MD on 04/22/2024:  Common labs          3/7/2024    06:39 3/8/2024    05:25 3/15/2024    11:26   Common Labs   Glucose 75  76  82    BUN 14  11  9    Creatinine 0.93  0.63  0.63    Sodium 139  140  138    Potassium 3.5  3.6  4.3    Chloride 102  103  103    Calcium 8.1  8.0  9.3    Albumin 3.1  3.0  4.2    Total Bilirubin 2.0  1.6  1.7    Alkaline Phosphatase 305  290  411    AST (SGOT) 35  38  39    ALT (SGPT) 49  51  50    WBC   4.05    Hemoglobin   10.5    Hematocrit   33.0    Platelets   140      Data reviewed : last GI notes, EGD -summarized above.             Assessment and Plan     Diagnoses and all orders for this visit:    1. Congenital cirrhosis (Primary)  Assessment & Plan:  H/o biliary atresia  Complications: EV (on BB), portal HTN, mild ascites, splenomegaly  MELD 9  Immune Hep A/B.  Continue to follow with GI, yearly EGD for EV screening.  Avoid alcohol, limit Tylenol <2g, low salt diet.    Orders:  -     Hepatitis A Virus (HAV) Antibody, Total With Reflex to IgM  -     Hepatitis B surface antigen  -     Hepatitis B Surface Antibody  -     Hepatitis C antibody    2. Urinary tract infection with hematuria, site unspecified  Comments:  Resolved.  Orders:  -     Urinalysis With Culture If Indicated -    Other orders  -     Microscopic Examination -  -      Hepatitis A Antibody, IgM             Follow Up     No follow-ups on file.  Patient was given instructions and counseling regarding her condition or for health maintenance advice. Please see specific information pulled into the AVS if appropriate.

## 2024-04-23 LAB
APPEARANCE UR: CLEAR
BACTERIA #/AREA URNS HPF: NORMAL /[HPF]
BILIRUB UR QL STRIP: NEGATIVE
CASTS URNS QL MICRO: NORMAL /LPF
COLOR UR: YELLOW
EPI CELLS #/AREA URNS HPF: NORMAL /HPF (ref 0–10)
GLUCOSE UR QL STRIP: NEGATIVE
HAV AB SER QL IA: POSITIVE
HAV IGM SERPL QL IA: NEGATIVE
HBV SURFACE AB SER QL: REACTIVE
HBV SURFACE AG SERPL QL IA: NEGATIVE
HCV IGG SERPL QL IA: NON REACTIVE
HGB UR QL STRIP: NEGATIVE
KETONES UR QL STRIP: NEGATIVE
LEUKOCYTE ESTERASE UR QL STRIP: NEGATIVE
MICRO URNS: NORMAL
MICRO URNS: NORMAL
NITRITE UR QL STRIP: NEGATIVE
PH UR STRIP: 6.5 [PH] (ref 5–7.5)
PROT UR QL STRIP: NEGATIVE
RBC #/AREA URNS HPF: NORMAL /HPF (ref 0–2)
SP GR UR STRIP: 1.01 (ref 1–1.03)
URINALYSIS REFLEX: NORMAL
UROBILINOGEN UR STRIP-MCNC: 0.2 MG/DL (ref 0.2–1)
WBC #/AREA URNS HPF: NORMAL /HPF (ref 0–5)

## 2024-04-27 NOTE — PROGRESS NOTES
Chief Complaint   Patient presents with   • Abdominal Pain       Chantelle Vásquez is a  39 y.o. female here for a follow up visit for history and pain.    HPI    Pt 38 yo female with history biliary atresia.  Pt feeling improved.  Patient denies nausea vomiting fever or chills.    Past Medical History:   Diagnosis Date   • Congenital biliary atresia    • History of esophageal varices 2016   • HTN (hypertension)    • Liver disease    • Portal hypertension          Current Outpatient Medications:   •  multivitamin with minerals tablet tablet, Take 1 tablet by mouth Daily., Disp: , Rfl:   •  nadolol (CORGARD) 20 MG tablet, TAKE 1 TABLET BY MOUTH EVERY DAY, Disp: 90 tablet, Rfl: 1  •  omeprazole (priLOSEC) 20 MG capsule, Take 1 capsule by mouth Daily., Disp: 90 capsule, Rfl: 3    Allergies   Allergen Reactions   • Aspirin Other (See Comments) and GI Intolerance     AVOIDS DUE TO LIVER HISTORY       Social History     Socioeconomic History   • Marital status:    Tobacco Use   • Smoking status: Never     Passive exposure: Never   • Smokeless tobacco: Never   Vaping Use   • Vaping status: Never Used   Substance and Sexual Activity   • Alcohol use: No   • Drug use: No   • Sexual activity: Yes     Partners: Male     Birth control/protection: Tubal ligation       Family History   Problem Relation Age of Onset   • Heart disease Mother    • Malig Hyperthermia Neg Hx        Review of Systems   Constitutional: Negative.    Respiratory: Negative.     Cardiovascular: Negative.    Gastrointestinal: Negative.    Musculoskeletal: Negative.    Skin: Negative.    Hematological: Negative.      Vitals:    04/09/24 1557   BP: 112/78   Pulse: 80   Temp: 97.8 °F (36.6 °C)       Physical Exam  Vitals reviewed.   Constitutional:       Appearance: Normal appearance. She is well-developed and normal weight.   HENT:      Head: Normocephalic and atraumatic.      Mouth/Throat:      Mouth: Mucous membranes are moist.   Eyes:      General: No  scleral icterus.     Pupils: Pupils are equal, round, and reactive to light.   Pulmonary:      Effort: Pulmonary effort is normal. No respiratory distress.      Breath sounds: Normal breath sounds.   Abdominal:      General: Bowel sounds are normal. There is no distension.      Palpations: Abdomen is soft.      Tenderness: There is no abdominal tenderness.   Skin:     General: Skin is warm and dry.      Coloration: Skin is not jaundiced.      Findings: No rash.   Neurological:      General: No focal deficit present.      Mental Status: She is alert and oriented to person, place, and time.      Cranial Nerves: No cranial nerve deficit.   Psychiatric:         Mood and Affect: Mood normal.         Behavior: Behavior normal.         Thought Content: Thought content normal.     Admission on 03/15/2024, Discharged on 03/15/2024   Component Date Value Ref Range Status   • HCG, Urine, QL 03/15/2024 Negative  Negative Final   • Lot Number 03/15/2024 [id number]   Final   • Internal Positive Control 03/15/2024 Positive  Positive, Passed Final   • Internal Negative Control 03/15/2024 Negative  Negative, Passed Final   • Expiration Date 03/15/2024 05/02/2025   Final   • WBC 03/15/2024 4.05  3.40 - 10.80 10*3/mm3 Final   • RBC 03/15/2024 3.72 (L)  3.77 - 5.28 10*6/mm3 Final   • Hemoglobin 03/15/2024 10.5 (L)  12.0 - 15.9 g/dL Final   • Hematocrit 03/15/2024 33.0 (L)  34.0 - 46.6 % Final   • MCV 03/15/2024 88.7  79.0 - 97.0 fL Final   • MCH 03/15/2024 28.2  26.6 - 33.0 pg Final   • MCHC 03/15/2024 31.8  31.5 - 35.7 g/dL Final   • RDW 03/15/2024 13.4  12.3 - 15.4 % Final   • RDW-SD 03/15/2024 42.4  37.0 - 54.0 fl Final   • MPV 03/15/2024 10.1  6.0 - 12.0 fL Final   • Platelets 03/15/2024 140  140 - 450 10*3/mm3 Final   • Glucose 03/15/2024 82  65 - 99 mg/dL Final   • BUN 03/15/2024 9  6 - 20 mg/dL Final   • Creatinine 03/15/2024 0.63  0.57 - 1.00 mg/dL Final   • Sodium 03/15/2024 138  136 - 145 mmol/L Final   • Potassium 03/15/2024  4.3  3.5 - 5.2 mmol/L Final   • Chloride 03/15/2024 103  98 - 107 mmol/L Final   • CO2 03/15/2024 26.0  22.0 - 29.0 mmol/L Final   • Calcium 03/15/2024 9.3  8.6 - 10.5 mg/dL Final   • Total Protein 03/15/2024 6.6  6.0 - 8.5 g/dL Final   • Albumin 03/15/2024 4.2  3.5 - 5.2 g/dL Final   • ALT (SGPT) 03/15/2024 50 (H)  1 - 33 U/L Final   • AST (SGOT) 03/15/2024 39 (H)  1 - 32 U/L Final   • Alkaline Phosphatase 03/15/2024 411 (H)  39 - 117 U/L Final   • Total Bilirubin 03/15/2024 1.7 (H)  0.0 - 1.2 mg/dL Final   • Globulin 03/15/2024 2.4  gm/dL Final   • A/G Ratio 03/15/2024 1.8  g/dL Final   • BUN/Creatinine Ratio 03/15/2024 14.3  7.0 - 25.0 Final   • Anion Gap 03/15/2024 9.0  5.0 - 15.0 mmol/L Final   • eGFR 03/15/2024 115.9  >60.0 mL/min/1.73 Final   • Protime 03/15/2024 13.9  11.7 - 14.2 Seconds Final   • INR 03/15/2024 1.05  0.90 - 1.10 Final   No results displayed because visit has over 200 results.      Admission on 03/01/2024, Discharged on 03/01/2024   Component Date Value Ref Range Status   • SARS Antigen 03/01/2024 Not Detected  Not Detected, Presumptive Negative Final   • Influenza A Antigen HARJEET 03/01/2024 Not Detected  Not Detected Final   • Influenza B Antigen HARJEET 03/01/2024 Not Detected  Not Detected Final   • Internal Control 03/01/2024 Passed  Passed Final   • Lot Number 03/01/2024 [id number]   Final   • Expiration Date 03/01/2024 01/16/2025   Final       Diagnoses and all orders for this visit:    1. Biliary atresia (Primary)    2. Portal venous hypertension    Other orders  -     omeprazole (priLOSEC) 20 MG capsule; Take 1 capsule by mouth Daily.  Dispense: 90 capsule; Refill: 3      Pt 40 yo female with congenital biliary atresia s/p Kasai procedure doing well.  Reviewed MRI doing well will continue to monitor clinically.

## 2024-04-29 PROBLEM — I85.10 SECONDARY ESOPHAGEAL VARICES WITHOUT BLEEDING: Status: ACTIVE | Noted: 2024-04-29

## 2024-04-29 NOTE — ASSESSMENT & PLAN NOTE
H/o biliary atresia  Complications: EV (on BB), portal HTN, mild ascites, splenomegaly  MELD 9  Immune Hep A/B.  Continue to follow with GI, yearly EGD for EV screening.  Avoid alcohol, limit Tylenol <2g, low salt diet.

## 2024-10-23 ENCOUNTER — OFFICE VISIT (OUTPATIENT)
Dept: FAMILY MEDICINE CLINIC | Facility: CLINIC | Age: 39
End: 2024-10-23

## 2024-10-23 VITALS
BODY MASS INDEX: 22.73 KG/M2 | SYSTOLIC BLOOD PRESSURE: 112 MMHG | OXYGEN SATURATION: 99 % | TEMPERATURE: 97.5 F | HEIGHT: 68 IN | DIASTOLIC BLOOD PRESSURE: 62 MMHG | HEART RATE: 70 BPM | WEIGHT: 150 LBS

## 2024-10-23 DIAGNOSIS — Z00.00 ENCOUNTER FOR HEALTH MAINTENANCE EXAMINATION IN ADULT: Primary | ICD-10-CM

## 2024-10-23 DIAGNOSIS — Z13.6 SCREENING FOR ISCHEMIC HEART DISEASE: ICD-10-CM

## 2024-10-23 DIAGNOSIS — Z13.0 SCREENING FOR DEFICIENCY ANEMIA: ICD-10-CM

## 2024-10-23 NOTE — PROGRESS NOTES
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     Diagnosed at birth with congenital biliary atresia. No complications. Started seeing GI during her pregnancies. Follows Dr. Castellanos yearly. Recent EGD with PHG, EV. Minimal ascites. Splenomegaly. On nadalol. Platelets normal. INR normal. Immune to hep A/B.    Otherwise, no medical problems. Mother does have history of cardiac disease. No recent lipid panel.       Objective   Vital Signs:  /62   Pulse 70   Temp 97.5 °F (36.4 °C)   Ht 172.7 cm (68\")   Wt 68 kg (150 lb)   SpO2 99%   BMI 22.81 kg/m²   Estimated body mass index is 22.81 kg/m² as calculated from the following:    Height as of this encounter: 172.7 cm (68\").    Weight as of this encounter: 68 kg (150 lb).    BMI is within normal parameters. No other follow-up for BMI required.      Physical Exam  Constitutional:       General: She is not in acute distress.  Eyes:      General: No scleral icterus.     Conjunctiva/sclera: Conjunctivae normal.   Cardiovascular:      Rate and Rhythm: Normal rate and regular rhythm.   Pulmonary:      Effort: Pulmonary effort is normal. No respiratory distress.      Breath sounds: Normal breath sounds.   Abdominal:      General: There is no distension.      Palpations: Abdomen is soft. There is no mass.      Tenderness: There is no abdominal tenderness. There is no guarding or rebound.   Skin:     General: Skin is warm and dry.      Coloration: Skin is not jaundiced.   Neurological:      Mental Status: She is alert and oriented to person, place, and time.   Psychiatric:         Mood and Affect: Mood normal.         Behavior: Behavior normal.        Result Review :  The following data was reviewed by: Gilda Waldron MD on " 10/23/2024:  Common labs          3/7/2024    06:39 3/8/2024    05:25 3/15/2024    11:26   Common Labs   Glucose 75  76  82    BUN 14  11  9    Creatinine 0.93  0.63  0.63    Sodium 139  140  138    Potassium 3.5  3.6  4.3    Chloride 102  103  103    Calcium 8.1  8.0  9.3    Albumin 3.1  3.0  4.2    Total Bilirubin 2.0  1.6  1.7    Alkaline Phosphatase 305  290  411    AST (SGOT) 35  38  39    ALT (SGPT) 49  51  50    WBC   4.05    Hemoglobin   10.5    Hematocrit   33.0    Platelets   140      Data reviewed : see HPI above           Assessment and Plan   Diagnoses and all orders for this visit:    1. Encounter for health maintenance examination in adult (Primary)  Assessment & Plan:  Colonoscopy: avg risk, start at age 45 or per Dr. Castellanos  Cervical Cancer Screening: per GYN - will schedule  Mammogram: start at age 40  LDCT: never smoker  DEXA: indicated at age 65    Immunizations: eligible for flu, COVID  Labs: ordered today  The ASCVD Risk score (Gennaro LÓPEZ, et al., 2019) failed to calculate for the following reasons:    The 2019 ASCVD risk score is only valid for ages 40 to 79      Patient was counseled in regards to maintaining a healthy lifestyle, rich in whole grains, fruits and vegetables. Limit high saturated fats and processed sugars. Maintain an active lifestyle to promote overall health and well being.         2. Screening for deficiency anemia  -     CBC Auto Differential    3. Screening for ischemic heart disease  -     Comprehensive Metabolic Panel  -     ORDER: Hemoglobin A1c  -     TSH  -     Lipid Panel             Follow Up   Return in about 1 year (around 10/23/2025) for Annual physical.  Patient was given instructions and counseling regarding her condition or for health maintenance advice. Please see specific information pulled into the AVS if appropriate.

## 2024-10-23 NOTE — ASSESSMENT & PLAN NOTE
Colonoscopy: avg risk, start at age 45 or per Dr. Castellanos  Cervical Cancer Screening: per GYN - will schedule  Mammogram: start at age 40  LDCT: never smoker  DEXA: indicated at age 65    Immunizations: eligible for flu, COVID  Labs: ordered today  The ASCVD Risk score (Gennaro LÓPEZ, et al., 2019) failed to calculate for the following reasons:    The 2019 ASCVD risk score is only valid for ages 40 to 79      Patient was counseled in regards to maintaining a healthy lifestyle, rich in whole grains, fruits and vegetables. Limit high saturated fats and processed sugars. Maintain an active lifestyle to promote overall health and well being.

## 2024-10-24 LAB
ALBUMIN SERPL-MCNC: 4.4 G/DL (ref 3.5–5.2)
ALBUMIN/GLOB SERPL: 2 G/DL
ALP SERPL-CCNC: 94 U/L (ref 39–117)
ALT SERPL-CCNC: 29 U/L (ref 1–33)
AST SERPL-CCNC: 21 U/L (ref 1–32)
BASOPHILS # BLD AUTO: ABNORMAL 10*3/UL
BILIRUB SERPL-MCNC: 0.5 MG/DL (ref 0–1.2)
BUN SERPL-MCNC: 9 MG/DL (ref 6–20)
BUN/CREAT SERPL: 13 (ref 7–25)
CALCIUM SERPL-MCNC: 9.3 MG/DL (ref 8.6–10.5)
CHLORIDE SERPL-SCNC: 103 MMOL/L (ref 98–107)
CHOLEST SERPL-MCNC: 143 MG/DL (ref 0–200)
CO2 SERPL-SCNC: 25.4 MMOL/L (ref 22–29)
CREAT SERPL-MCNC: 0.69 MG/DL (ref 0.57–1)
EGFRCR SERPLBLD CKD-EPI 2021: 113.4 ML/MIN/1.73
EOSINOPHIL # BLD AUTO: ABNORMAL 10*3/UL
EOSINOPHIL NFR BLD AUTO: ABNORMAL %
ERYTHROCYTE [DISTWIDTH] IN BLOOD BY AUTOMATED COUNT: 12.7 % (ref 12.3–15.4)
GLOBULIN SER CALC-MCNC: 2.2 GM/DL
GLUCOSE SERPL-MCNC: 82 MG/DL (ref 65–99)
HBA1C MFR BLD: 4.5 % (ref 4.8–5.6)
HCT VFR BLD AUTO: 40.6 % (ref 34–46.6)
HDLC SERPL-MCNC: 47 MG/DL (ref 40–60)
HGB BLD-MCNC: 13.5 G/DL (ref 12–15.9)
LDLC SERPL CALC-MCNC: 82 MG/DL (ref 0–100)
LYMPHOCYTES # BLD AUTO: ABNORMAL 10*3/UL
LYMPHOCYTES NFR BLD AUTO: ABNORMAL %
MCH RBC QN AUTO: 30.1 PG (ref 26.6–33)
MCHC RBC AUTO-ENTMCNC: 33.3 G/DL (ref 31.5–35.7)
MCV RBC AUTO: 90.4 FL (ref 79–97)
MONOCYTES NFR BLD AUTO: ABNORMAL %
NEUTROPHILS NFR BLD AUTO: ABNORMAL %
PLATELET # BLD AUTO: 134 10*3/MM3 (ref 140–450)
POTASSIUM SERPL-SCNC: 4.6 MMOL/L (ref 3.5–5.2)
PROT SERPL-MCNC: 6.6 G/DL (ref 6–8.5)
RBC # BLD AUTO: 4.49 10*6/MM3 (ref 3.77–5.28)
SODIUM SERPL-SCNC: 137 MMOL/L (ref 136–145)
TRIGL SERPL-MCNC: 69 MG/DL (ref 0–150)
TSH SERPL DL<=0.005 MIU/L-ACNC: 1.52 UIU/ML (ref 0.27–4.2)
VLDLC SERPL CALC-MCNC: 14 MG/DL (ref 5–40)
WBC # BLD AUTO: 7.27 10*3/MM3 (ref 3.4–10.8)

## 2024-11-01 ENCOUNTER — TELEPHONE (OUTPATIENT)
Dept: GASTROENTEROLOGY | Facility: CLINIC | Age: 39
End: 2024-11-01

## 2024-11-01 NOTE — TELEPHONE ENCOUNTER
Provider: ARIEL HERNANDEZ    Caller: LUCRECIA ROJO    Relationship to Patient: SELF    Pharmacy: [facility]    Phone Number: [phone]     Reason for Call: PT CALLED TO FIND OUT WHY HER MEDICATION NADOLOL WAS DENIED. PT HAS 5DAYS OF MEDICATION LEFT.    PLEASE REVIEW AND CALL PT TO DISCUSS.    When was the patient last seen: 4/9/24 WITH DR SANTIAGO

## 2024-11-05 RX ORDER — NADOLOL 20 MG/1
20 TABLET ORAL DAILY
Qty: 90 TABLET | Refills: 3 | Status: SHIPPED | OUTPATIENT
Start: 2024-11-05

## 2024-11-05 NOTE — TELEPHONE ENCOUNTER
I refilled it again to her [facility] pharmacy in [place].  Should be successful this time.  Not sure why it was denied.

## 2024-12-16 ENCOUNTER — OFFICE VISIT (OUTPATIENT)
Dept: FAMILY MEDICINE CLINIC | Facility: CLINIC | Age: 39
End: 2024-12-16
Payer: MEDICAID

## 2024-12-16 VITALS
HEIGHT: 68 IN | RESPIRATION RATE: 16 BRPM | BODY MASS INDEX: 22.28 KG/M2 | TEMPERATURE: 97.3 F | SYSTOLIC BLOOD PRESSURE: 100 MMHG | HEART RATE: 70 BPM | WEIGHT: 147 LBS | DIASTOLIC BLOOD PRESSURE: 76 MMHG | OXYGEN SATURATION: 99 %

## 2024-12-16 DIAGNOSIS — J31.0 RHINITIS, UNSPECIFIED TYPE: ICD-10-CM

## 2024-12-16 DIAGNOSIS — H61.23 IMPACTED CERUMEN OF BOTH EARS: Primary | ICD-10-CM

## 2024-12-16 PROCEDURE — 99213 OFFICE O/P EST LOW 20 MIN: CPT | Performed by: STUDENT IN AN ORGANIZED HEALTH CARE EDUCATION/TRAINING PROGRAM

## 2024-12-16 PROCEDURE — 69209 REMOVE IMPACTED EAR WAX UNI: CPT | Performed by: STUDENT IN AN ORGANIZED HEALTH CARE EDUCATION/TRAINING PROGRAM

## 2024-12-16 NOTE — PATIENT INSTRUCTIONS
"For runny nose/nasal congestion (\"rhinitis\") use either Flonase (Fluticasone) or Mometasone nasal spray. 2 sprays each nostril once daily.    "

## 2024-12-16 NOTE — PROGRESS NOTES
"Chief Complaint  Earache (Hearing sound muffled, right ear x 2 days feels like under water and pressure. )    Subjective        Chantelle Vásquez presents to [facility]  History of Present Illness    Pt here today c/o R ear pressure and decreased hearing  Started about 1 week ago with slight tickle sensation and popping sensation in R ear  Then 2 days ago, started feeling pressure, like she was under water   No recent swimming/submersion under water  Says she has been dealing with nasal congestion/rhinorrhea since Nov, which is pretty typical for her  Hasn't tried using any meds for it   Denies any fevers/chills or general illness feelings  Denies any ear drainage       Objective   Vital Signs:  /76   Pulse 70   Temp 97.3 °F (36.3 °C)   Resp 16   Ht 172.7 cm (67.99\")   Wt 66.7 kg (147 lb)   SpO2 99%   BMI 22.36 kg/m²   Estimated body mass index is 22.36 kg/m² as calculated from the following:    Height as of this encounter: 172.7 cm (67.99\").    Weight as of this encounter: 66.7 kg (147 lb).    BMI is within normal parameters. No other follow-up for BMI required.      Physical Exam  Constitutional:       General: She is not in acute distress.     Appearance: Normal appearance. She is not ill-appearing.   HENT:      Head: Normocephalic and atraumatic.      Right Ear: There is impacted cerumen.      Left Ear: There is impacted cerumen (canal ~75% occluded wtih impacted cerumen).   Eyes:      Extraocular Movements: Extraocular movements intact.   Cardiovascular:      Rate and Rhythm: Normal rate.   Pulmonary:      Effort: Pulmonary effort is normal.   Neurological:      Mental Status: She is alert.        Result Review :         Ear Cerumen Removal    Date/Time: 12/16/2024 12:45 PM    Performed by: Radha Myers MA  Authorized by: Maye Stallings MD  Patient understanding: patient states understanding of the procedure being performed    Anesthesia:  Local Anesthetic: " none  Location details: left ear and right ear  Patient tolerance: patient tolerated the procedure well with no immediate complications  Procedure type: irrigation           Assessment and Plan   Diagnoses and all orders for this visit:    1. Impacted cerumen of both ears (Primary)  -Bilateral ear lavage successfully performed today.  Repeat otoscope exam demonstrated small effusions bilaterally, but no erythema, bulging, perforations, or other TM abnormalities  -Patient did endorse significant relief status post lavage    -     Ear Cerumen Removal    2. Rhinitis, unspecified type  -Likely allergic rhinitis, given patient reports similar symptoms during this time of year in the past  -Advised part of her symptoms may be due to eustachian tube dysfunction/congestion from her rhinitis symptoms and should consider treatment with daily Flonase 2 sprays each nostril.  Proper administration of intranasal corticosteroid reviewed           Follow Up   Return if symptoms worsen or fail to improve.  Patient was given instructions and counseling regarding her condition or for health maintenance advice. Please see specific information pulled into the AVS if appropriate.

## 2025-02-21 ENCOUNTER — OFFICE VISIT (OUTPATIENT)
Dept: FAMILY MEDICINE CLINIC | Facility: CLINIC | Age: 40
End: 2025-02-21

## 2025-02-21 VITALS
OXYGEN SATURATION: 97 % | HEIGHT: 68 IN | SYSTOLIC BLOOD PRESSURE: 96 MMHG | WEIGHT: 149.4 LBS | HEART RATE: 133 BPM | TEMPERATURE: 98.4 F | BODY MASS INDEX: 22.64 KG/M2 | DIASTOLIC BLOOD PRESSURE: 58 MMHG

## 2025-02-21 DIAGNOSIS — J02.9 SORE THROAT: ICD-10-CM

## 2025-02-21 DIAGNOSIS — N30.80 ACUTE BACTERIAL SIMPLE CYSTITIS: Primary | ICD-10-CM

## 2025-02-21 DIAGNOSIS — R11.2 NAUSEA AND VOMITING, UNSPECIFIED VOMITING TYPE: ICD-10-CM

## 2025-02-21 DIAGNOSIS — R05.1 ACUTE COUGH: ICD-10-CM

## 2025-02-21 DIAGNOSIS — B96.89 ACUTE BACTERIAL SIMPLE CYSTITIS: Primary | ICD-10-CM

## 2025-02-21 LAB
BILIRUB BLD-MCNC: ABNORMAL MG/DL
CLARITY, POC: CLEAR
COLOR UR: ABNORMAL
EXPIRATION DATE: ABNORMAL
EXPIRATION DATE: NORMAL
EXPIRATION DATE: NORMAL
FLUAV AG UPPER RESP QL IA.RAPID: NOT DETECTED
FLUBV AG UPPER RESP QL IA.RAPID: NOT DETECTED
GLUCOSE UR STRIP-MCNC: ABNORMAL MG/DL
INTERNAL CONTROL: NORMAL
INTERNAL CONTROL: NORMAL
KETONES UR QL: ABNORMAL
LEUKOCYTE EST, POC: ABNORMAL
Lab: ABNORMAL
Lab: NORMAL
Lab: NORMAL
NITRITE UR-MCNC: NEGATIVE MG/ML
PH UR: 6 [PH] (ref 5–8)
PROT UR STRIP-MCNC: ABNORMAL MG/DL
RBC # UR STRIP: ABNORMAL /UL
S PYO AG THROAT QL: NEGATIVE
SARS-COV-2 AG UPPER RESP QL IA.RAPID: NOT DETECTED
SP GR UR: 1.03 (ref 1–1.03)
UROBILINOGEN UR QL: NORMAL

## 2025-02-21 RX ORDER — ONDANSETRON 4 MG/1
4 TABLET, ORALLY DISINTEGRATING ORAL ONCE
Status: COMPLETED | OUTPATIENT
Start: 2025-02-21 | End: 2025-02-21

## 2025-02-21 RX ORDER — CEFDINIR 300 MG/1
300 CAPSULE ORAL 2 TIMES DAILY
Qty: 14 CAPSULE | Refills: 0 | Status: SHIPPED | OUTPATIENT
Start: 2025-02-21 | End: 2025-02-28

## 2025-02-21 RX ORDER — ONDANSETRON 4 MG/1
4 TABLET, ORALLY DISINTEGRATING ORAL EVERY 8 HOURS PRN
Qty: 21 TABLET | Refills: 0 | Status: SHIPPED | OUTPATIENT
Start: 2025-02-21

## 2025-02-21 RX ADMIN — ONDANSETRON 4 MG: 4 TABLET, ORALLY DISINTEGRATING ORAL at 10:51

## 2025-02-21 NOTE — PROGRESS NOTES
"Chief Complaint  Fever (Pt states she been experiencing symptoms since yesterday. She has had nausea, chills, fatigue as well. Pt states things got worse towards nighttime. ), Sore Throat, and Cough    Subjective        Chantelle Vásquez presents to [facility]  History of Present Illness    Patient here today complaining of nausea, chills, fatigue, sore throat and cough  Says sx initially onset with sore throat and nasal congestion 4 days ago, which was the same day got back from trip to [place], but started using flonase and says those sx resolved within 24 hrs  Then yest developed body aches, subj fever, chills  Currently denies sore throat or congestion/rhinorrhea  Endorses mild dry cough  Took ibuprofen all day yest, started feeling nauseous last night  Did vomit once this AM  No known sick contacts, works part time at a [employer], so is around a lot of kids and just got back from [place] 4 days ago, so was exposed to a lot of people on plane in an airport  Nearing the end of her period right now, started on 2/17  Hasn't taken anything else for her sx, just ibuprofen   Last year, got septic from strep throat and UTI, so wanted to be extra-cautious with development of these current sx to avoid similar experience as last year  Does feel very dehydrated   Denies any urinary sx, no dysuria, urinary frequency, changes to appearance or odor of urine, flank or back pain      Objective   Vital Signs:  BP 96/58   Pulse (!) 133   Temp 98.4 °F (36.9 °C)   Ht 172.7 cm (67.99\")   Wt 67.8 kg (149 lb 6.4 oz)   SpO2 97%   BMI 22.72 kg/m²   Estimated body mass index is 22.72 kg/m² as calculated from the following:    Height as of this encounter: 172.7 cm (67.99\").    Weight as of this encounter: 67.8 kg (149 lb 6.4 oz).    BMI is within normal parameters. No other follow-up for BMI required.      Physical Exam  Constitutional:       General: She is not in acute distress.     Appearance: Normal appearance. " She is ill-appearing (slightly ill-appearing). She is not toxic-appearing or diaphoretic.   HENT:      Head: Normocephalic and atraumatic.      Right Ear: Tympanic membrane, ear canal and external ear normal. There is no impacted cerumen.      Left Ear: Tympanic membrane, ear canal and external ear normal. There is no impacted cerumen.      Nose: Congestion (Mild congestion nasal mucosa) and rhinorrhea (Small amount thick colored discharge present) present.      Mouth/Throat:      Mouth: Mucous membranes are moist.      Pharynx: Oropharynx is clear. Posterior oropharyngeal erythema (Moderate erythema of posterior oropharynx) present. No oropharyngeal exudate (No tonsillar hypertrophy).   Eyes:      General:         Right eye: No discharge.         Left eye: No discharge.      Extraocular Movements: Extraocular movements intact.   Cardiovascular:      Rate and Rhythm: Tachycardia present.      Heart sounds: Normal heart sounds. No murmur heard.     Comments: 2 sec capillary refill  Pulmonary:      Effort: Pulmonary effort is normal. No respiratory distress.      Breath sounds: Normal breath sounds. No wheezing.   Abdominal:      General: Abdomen is flat.      Palpations: Abdomen is soft.      Tenderness: There is no right CVA tenderness or left CVA tenderness.      Comments: No suprapubic tenderness   Lymphadenopathy:      Cervical: No cervical adenopathy.   Neurological:      Mental Status: She is alert.        Result Review :                Brief Urine Lab Results  (Last result in the past 365 days)        Color   Clarity   Blood   Leuk Est   Nitrite   Protein   CREAT   Urine HCG        02/21/25 1110 Dark Yellow   Clear   Small   Large (3+)   Negative   100 mg/dL                     Assessment & Plan SUMMARY (see orders by problem below):  Rapid strep, COVID and influenza A/B all negative today  Even though patient without any CVA tenderness or urinary symptoms, due to her history did check urinalysis, which was  suggestive of UTI.  Therefore, advised will empirically treat for UTI, chose cefdinir due to minimal hepatic metabolism, given patient's underlying congenital cirrhosis  Tachycardia and urinalysis result also suggestive of dehydration secondary to nausea.  Patient received Zofran in clinic, and did report nausea improved  Zofran Rx provided and patient advised to significantly hydrate at home with water and Pedialyte  ED precautions reviewed  Given reported history of sepsis 1 year ago and tachycardia today, CBC, CMP, and lactic acid drawn today, ordered stat. Note further chart review after visit did demonstrate neg blood and urine cultures from Mar 2024  Pt and partner notified lab results will be back at the earliest Fri after clinic closes. Discussed if any significant lab abnormalities, the on-call physician will be contacted by the lab who can then either notify me or notify the pt directly.     Assessment and Plan   Diagnoses and all orders for this visit:    1. Acute bacterial simple cystitis (Primary)  -     Urine Culture - Urine, Urine, Clean Catch  -     cefdinir (OMNICEF) 300 MG capsule; Take 1 capsule by mouth 2 (Two) Times a Day for 7 days.  Dispense: 14 capsule; Refill: 0    2. Nausea and vomiting, unspecified vomiting type  -     ondansetron ODT (ZOFRAN-ODT) disintegrating tablet 4 mg  -     CBC & Differential  -     Comprehensive Metabolic Panel  -     Lactic Acid, Plasma  -     POC Urinalysis Dipstick, Automated  -     ondansetron ODT (ZOFRAN-ODT) 4 MG disintegrating tablet; Place 1 tablet on the tongue Every 8 (Eight) Hours As Needed for Nausea or Vomiting.  Dispense: 21 tablet; Refill: 0    3. Acute cough  -     POCT SARS-CoV-2 Antigen HARJEET + Flu  -     POCT rapid strep A    4. Sore throat  -     POCT rapid strep A    5. Congenital cirrhosis             Follow Up   Return if symptoms worsen or fail to improve.  Patient was given instructions and counseling regarding her condition or for health  maintenance advice. Please see specific information pulled into the AVS if appropriate.

## 2025-02-22 ENCOUNTER — TELEPHONE (OUTPATIENT)
Dept: FAMILY MEDICINE CLINIC | Facility: CLINIC | Age: 40
End: 2025-02-22

## 2025-02-22 DIAGNOSIS — D69.6 THROMBOCYTOPENIA: ICD-10-CM

## 2025-02-22 DIAGNOSIS — E80.6 HYPERBILIRUBINEMIA: ICD-10-CM

## 2025-02-22 DIAGNOSIS — R74.01 TRANSAMINITIS: ICD-10-CM

## 2025-02-22 LAB
ALBUMIN SERPL-MCNC: 3.9 G/DL (ref 3.9–4.9)
ALP SERPL-CCNC: 134 IU/L (ref 44–121)
ALT SERPL-CCNC: 167 IU/L (ref 0–32)
AST SERPL-CCNC: 190 IU/L (ref 0–40)
BASOPHILS # BLD AUTO: 0 X10E3/UL (ref 0–0.2)
BASOPHILS NFR BLD AUTO: 0 %
BILIRUB SERPL-MCNC: 2.2 MG/DL (ref 0–1.2)
BUN SERPL-MCNC: 8 MG/DL (ref 6–24)
BUN/CREAT SERPL: 9 (ref 9–23)
CALCIUM SERPL-MCNC: 8.5 MG/DL (ref 8.7–10.2)
CHLORIDE SERPL-SCNC: 99 MMOL/L (ref 96–106)
CO2 SERPL-SCNC: 23 MMOL/L (ref 20–29)
CREAT SERPL-MCNC: 0.85 MG/DL (ref 0.57–1)
EGFRCR SERPLBLD CKD-EPI 2021: 89 ML/MIN/1.73
EOSINOPHIL # BLD AUTO: 0 X10E3/UL (ref 0–0.4)
EOSINOPHIL NFR BLD AUTO: 1 %
ERYTHROCYTE [DISTWIDTH] IN BLOOD BY AUTOMATED COUNT: 12.6 % (ref 11.7–15.4)
GLOBULIN SER CALC-MCNC: 2.1 G/DL (ref 1.5–4.5)
GLUCOSE SERPL-MCNC: 100 MG/DL (ref 70–99)
HCT VFR BLD AUTO: 37.9 % (ref 34–46.6)
HGB BLD-MCNC: 12.5 G/DL (ref 11.1–15.9)
IMM GRANULOCYTES # BLD AUTO: 0 X10E3/UL (ref 0–0.1)
IMM GRANULOCYTES NFR BLD AUTO: 0 %
LACTATE SERPL-MCNC: 13.2 MG/DL (ref 4.8–25.7)
LYMPHOCYTES # BLD AUTO: 0.1 X10E3/UL (ref 0.7–3.1)
LYMPHOCYTES NFR BLD AUTO: 2 %
MCH RBC QN AUTO: 29.6 PG (ref 26.6–33)
MCHC RBC AUTO-ENTMCNC: 33 G/DL (ref 31.5–35.7)
MCV RBC AUTO: 90 FL (ref 79–97)
MONOCYTES # BLD AUTO: 0.1 X10E3/UL (ref 0.1–0.9)
MONOCYTES NFR BLD AUTO: 4 %
MORPHOLOGY BLD-IMP: ABNORMAL
NEUTROPHILS # BLD AUTO: 3.3 X10E3/UL (ref 1.4–7)
NEUTROPHILS NFR BLD AUTO: 93 %
PLATELET # BLD AUTO: 63 X10E3/UL (ref 150–450)
POTASSIUM SERPL-SCNC: 3.2 MMOL/L (ref 3.5–5.2)
PROT SERPL-MCNC: 6 G/DL (ref 6–8.5)
RBC # BLD AUTO: 4.23 X10E6/UL (ref 3.77–5.28)
SODIUM SERPL-SCNC: 135 MMOL/L (ref 134–144)
WBC # BLD AUTO: 3.6 X10E3/UL (ref 3.4–10.8)

## 2025-02-22 NOTE — TELEPHONE ENCOUNTER
Called and spoke to pt regarding lab results and follow-up on her symptoms. Pt reports she is feeling remarkably better and improvement started shortly after leaving our office appt yesterday. She has had no further n/v, fevers, chills, or malaise, and has been working hard to stay well-hydrated.    Discussed that is it reassuring that her sx have improved. However, her labs did show some acute worsening of LFTs and associated thrombocytopenia, which could be anticipated in the setting of an acute illness. Discussed it is something I want to follow up on to ensure resolution. I have ordered repeat labs and counseled her to call the clinic Mon AM to schedule a non-fasting lab appt some time between 2/24-2/26 to get these labs drawn. Pt agreeable to this plan. Advised continued rest and hydration until that time.      Diagnosis Plan   1. Congenital cirrhosis  CBC & Differential    Comprehensive Metabolic Panel    Protime-INR      2. Transaminitis  Comprehensive Metabolic Panel    Protime-INR      3. Hyperbilirubinemia  Comprehensive Metabolic Panel    Protime-INR      4. Thrombocytopenia  CBC & Differential

## 2025-02-23 LAB
BACTERIA UR CULT: NORMAL
BACTERIA UR CULT: NORMAL

## 2025-06-07 DIAGNOSIS — N30.80 ACUTE BACTERIAL SIMPLE CYSTITIS: Primary | ICD-10-CM

## 2025-06-07 DIAGNOSIS — B96.89 ACUTE BACTERIAL SIMPLE CYSTITIS: Primary | ICD-10-CM

## 2025-06-07 RX ORDER — CEFDINIR 300 MG/1
300 CAPSULE ORAL 2 TIMES DAILY
Qty: 14 CAPSULE | Refills: 0 | Status: SHIPPED | OUTPATIENT
Start: 2025-06-07 | End: 2025-06-14

## 2025-06-07 NOTE — PROGRESS NOTES
After Hours Call  Caller: Self  Call Back #: [phone]    Received after-hours call from pt regarding concern for recurrent UTI sx. Pt states she is going out of town and hoping to get antibiotics tonight. On chart review, pt last seen Feb 2025 for similar concerns and reported significant improvement with Cefdinir abx. Called pt and left  noting rx for Cefdinir has been sent in to [facility] pharmacy. I did state in vm if she is able to be seen at  tonight that would be optimal, as could get urine testing for U/A and cx. However, if she is unable to make it to , my abx prescription is available. Advised calling back if any further questions or concerns.        Diagnosis Plan   1. Acute bacterial simple cystitis  cefdinir (OMNICEF) 300 MG capsule

## (undated) DEVICE — TBG 02 CRUSH RESIST LF CLR 7FT

## (undated) DEVICE — MSK ENDO PORT O2 POM ELITE CURAPLEX A/

## (undated) DEVICE — Device: Brand: DEFENDO AIR/WATER/SUCTION AND BIOPSY VALVE

## (undated) DEVICE — TUBING, SUCTION, 1/4" X 10', STRAIGHT: Brand: MEDLINE

## (undated) DEVICE — SENSR O2 OXIMAX FNGR A/ 18IN NONSTR

## (undated) DEVICE — CANN O2 ETCO2 FITS ALL CONN CO2 SMPL A/ 7IN DISP LF

## (undated) DEVICE — ADAPT CLN BIOGUARD AIR/H2O DISP

## (undated) DEVICE — BLCK/BITE BLOX W/DENTL/RIM W/STRAP 54F

## (undated) DEVICE — KT ORCA ORCAPOD DISP STRL

## (undated) DEVICE — BITEBLOCK OMNI BLOC

## (undated) DEVICE — LN SMPL CO2 SHTRM SD STREAM W/M LUER

## (undated) DEVICE — FRCP BX RADJAW4 NDL 2.8 240CM LG OG BX40

## (undated) DEVICE — CANN NASL CO2 TRULINK W/O2 A/